# Patient Record
Sex: FEMALE | Race: WHITE | Employment: OTHER | URBAN - METROPOLITAN AREA
[De-identification: names, ages, dates, MRNs, and addresses within clinical notes are randomized per-mention and may not be internally consistent; named-entity substitution may affect disease eponyms.]

---

## 2017-01-03 ENCOUNTER — TELEPHONE (OUTPATIENT)
Dept: FAMILY MEDICINE CLINIC | Age: 62
End: 2017-01-03

## 2017-01-03 RX ORDER — TRAMADOL HYDROCHLORIDE 50 MG/1
50 TABLET ORAL EVERY 6 HOURS PRN
Qty: 60 TABLET | Refills: 0 | Status: SHIPPED | OUTPATIENT
Start: 2017-01-03 | End: 2017-01-19 | Stop reason: SDUPTHER

## 2017-01-19 DIAGNOSIS — M54.41 CHRONIC RIGHT-SIDED LOW BACK PAIN WITH RIGHT-SIDED SCIATICA: Chronic | ICD-10-CM

## 2017-01-19 DIAGNOSIS — M81.0 OSTEOPOROSIS: Primary | Chronic | ICD-10-CM

## 2017-01-19 DIAGNOSIS — G89.29 CHRONIC RIGHT-SIDED LOW BACK PAIN WITH RIGHT-SIDED SCIATICA: Chronic | ICD-10-CM

## 2017-01-19 RX ORDER — TRAMADOL HYDROCHLORIDE 50 MG/1
50 TABLET ORAL EVERY 6 HOURS PRN
Qty: 60 TABLET | Refills: 0 | Status: SHIPPED | OUTPATIENT
Start: 2017-01-19 | End: 2017-02-06 | Stop reason: SDUPTHER

## 2017-01-20 ENCOUNTER — HOSPITAL ENCOUNTER (OUTPATIENT)
Age: 62
Setting detail: SPECIMEN
Discharge: HOME | End: 2017-01-20
Payer: COMMERCIAL

## 2017-01-20 ENCOUNTER — OFFICE VISIT (OUTPATIENT)
Dept: FAMILY MEDICINE CLINIC | Age: 62
End: 2017-01-20

## 2017-01-20 VITALS
HEIGHT: 65 IN | SYSTOLIC BLOOD PRESSURE: 130 MMHG | OXYGEN SATURATION: 98 % | HEART RATE: 89 BPM | DIASTOLIC BLOOD PRESSURE: 68 MMHG | RESPIRATION RATE: 14 BRPM | TEMPERATURE: 98.2 F

## 2017-01-20 DIAGNOSIS — R30.0 DYSURIA: ICD-10-CM

## 2017-01-20 DIAGNOSIS — N30.00 ACUTE CYSTITIS WITHOUT HEMATURIA: Primary | ICD-10-CM

## 2017-01-20 DIAGNOSIS — N39.0 URINARY TRACT INFECTION WITHOUT HEMATURIA, SITE UNSPECIFIED: ICD-10-CM

## 2017-01-20 LAB
BILIRUBIN, POC: ABNORMAL
BLOOD URINE, POC: ABNORMAL
CLARITY, POC: CLEAR
COLOR, POC: ABNORMAL
GLUCOSE URINE, POC: ABNORMAL
KETONES, POC: ABNORMAL
LEUKOCYTE EST, POC: ABNORMAL
NITRITE, POC: POSITIVE
PH, POC: 5
PROTEIN, POC: ABNORMAL
SPECIFIC GRAVITY, POC: <=1.005
UROBILINOGEN, POC: 1

## 2017-01-20 PROCEDURE — 87086 URINE CULTURE/COLONY COUNT: CPT

## 2017-01-20 PROCEDURE — 81002 URINALYSIS NONAUTO W/O SCOPE: CPT | Performed by: NURSE PRACTITIONER

## 2017-01-20 PROCEDURE — 99213 OFFICE O/P EST LOW 20 MIN: CPT | Performed by: NURSE PRACTITIONER

## 2017-01-20 RX ORDER — SULFAMETHOXAZOLE AND TRIMETHOPRIM 800; 160 MG/1; MG/1
1 TABLET ORAL 2 TIMES DAILY
Qty: 14 TABLET | Refills: 0 | Status: SHIPPED | OUTPATIENT
Start: 2017-01-20 | End: 2017-06-13 | Stop reason: SDUPTHER

## 2017-01-20 RX ORDER — MELOXICAM 7.5 MG/1
TABLET ORAL
COMMUNITY
Start: 2017-01-10 | End: 2018-01-19 | Stop reason: ALTCHOICE

## 2017-01-20 RX ORDER — CIPROFLOXACIN 500 MG/1
500 TABLET, FILM COATED ORAL 2 TIMES DAILY
Qty: 28 TABLET | Refills: 0 | Status: SHIPPED | OUTPATIENT
Start: 2017-01-20 | End: 2017-01-20 | Stop reason: ALTCHOICE

## 2017-01-21 ASSESSMENT — ENCOUNTER SYMPTOMS
WHEEZING: 0
SHORTNESS OF BREATH: 0
BACK PAIN: 1
NAUSEA: 0
COUGH: 0
VOMITING: 0

## 2017-01-23 LAB — URINE CULTURE, ROUTINE: NORMAL

## 2017-01-24 ENCOUNTER — OFFICE VISIT (OUTPATIENT)
Dept: FAMILY MEDICINE CLINIC | Age: 62
End: 2017-01-24

## 2017-01-24 VITALS
RESPIRATION RATE: 18 BRPM | DIASTOLIC BLOOD PRESSURE: 74 MMHG | TEMPERATURE: 97.5 F | HEIGHT: 65 IN | OXYGEN SATURATION: 95 % | BODY MASS INDEX: 23.16 KG/M2 | HEART RATE: 100 BPM | WEIGHT: 139 LBS | SYSTOLIC BLOOD PRESSURE: 96 MMHG

## 2017-01-24 DIAGNOSIS — B36.9 FUNGAL DERMATITIS: Primary | ICD-10-CM

## 2017-01-24 DIAGNOSIS — K59.09 CHRONIC CONSTIPATION: ICD-10-CM

## 2017-01-24 DIAGNOSIS — L30.4 INTERTRIGO: ICD-10-CM

## 2017-01-24 PROCEDURE — 99213 OFFICE O/P EST LOW 20 MIN: CPT | Performed by: NURSE PRACTITIONER

## 2017-01-24 RX ORDER — LUBIPROSTONE 8 UG/1
8 CAPSULE, GELATIN COATED ORAL 2 TIMES DAILY WITH MEALS
Qty: 60 CAPSULE | Refills: 1 | Status: SHIPPED | OUTPATIENT
Start: 2017-01-24 | End: 2017-04-14 | Stop reason: ALTCHOICE

## 2017-01-24 RX ORDER — FLUCONAZOLE 150 MG/1
150 TABLET ORAL DAILY
Qty: 7 TABLET | Refills: 0 | Status: SHIPPED | OUTPATIENT
Start: 2017-01-24 | End: 2017-03-01 | Stop reason: ALTCHOICE

## 2017-01-24 RX ORDER — KETOCONAZOLE 20 MG/G
CREAM TOPICAL
Qty: 30 G | Refills: 1 | Status: SHIPPED | OUTPATIENT
Start: 2017-01-24 | End: 2017-03-27 | Stop reason: SDUPTHER

## 2017-01-27 DIAGNOSIS — K58.1 IRRITABLE BOWEL SYNDROME WITH CONSTIPATION: Primary | ICD-10-CM

## 2017-02-06 DIAGNOSIS — G89.29 CHRONIC RIGHT-SIDED LOW BACK PAIN WITH RIGHT-SIDED SCIATICA: Chronic | ICD-10-CM

## 2017-02-06 DIAGNOSIS — M81.0 OSTEOPOROSIS: Chronic | ICD-10-CM

## 2017-02-06 DIAGNOSIS — M54.41 CHRONIC RIGHT-SIDED LOW BACK PAIN WITH RIGHT-SIDED SCIATICA: Chronic | ICD-10-CM

## 2017-02-06 RX ORDER — TRAMADOL HYDROCHLORIDE 50 MG/1
50 TABLET ORAL EVERY 6 HOURS PRN
Qty: 60 TABLET | Refills: 0 | Status: SHIPPED | OUTPATIENT
Start: 2017-02-06 | End: 2017-02-23 | Stop reason: SDUPTHER

## 2017-02-08 ENCOUNTER — TELEPHONE (OUTPATIENT)
Dept: FAMILY MEDICINE CLINIC | Age: 62
End: 2017-02-08

## 2017-02-23 DIAGNOSIS — M54.41 CHRONIC RIGHT-SIDED LOW BACK PAIN WITH RIGHT-SIDED SCIATICA: Chronic | ICD-10-CM

## 2017-02-23 DIAGNOSIS — G89.29 CHRONIC RIGHT-SIDED LOW BACK PAIN WITH RIGHT-SIDED SCIATICA: Chronic | ICD-10-CM

## 2017-02-23 DIAGNOSIS — M81.0 OSTEOPOROSIS: Chronic | ICD-10-CM

## 2017-02-23 RX ORDER — TRAMADOL HYDROCHLORIDE 50 MG/1
50 TABLET ORAL EVERY 6 HOURS PRN
Qty: 60 TABLET | Refills: 0 | Status: SHIPPED | OUTPATIENT
Start: 2017-02-23 | End: 2017-03-13 | Stop reason: SDUPTHER

## 2017-03-01 ENCOUNTER — HOSPITAL ENCOUNTER (OUTPATIENT)
Age: 62
Setting detail: SPECIMEN
Discharge: HOME | End: 2017-03-01
Payer: COMMERCIAL

## 2017-03-01 ENCOUNTER — OFFICE VISIT (OUTPATIENT)
Dept: FAMILY MEDICINE CLINIC | Age: 62
End: 2017-03-01

## 2017-03-01 VITALS
DIASTOLIC BLOOD PRESSURE: 64 MMHG | HEIGHT: 65 IN | TEMPERATURE: 98.3 F | RESPIRATION RATE: 24 BRPM | OXYGEN SATURATION: 93 % | HEART RATE: 102 BPM | BODY MASS INDEX: 23.99 KG/M2 | SYSTOLIC BLOOD PRESSURE: 102 MMHG | WEIGHT: 144 LBS

## 2017-03-01 DIAGNOSIS — N39.0 URINARY TRACT INFECTION WITHOUT HEMATURIA, SITE UNSPECIFIED: Primary | ICD-10-CM

## 2017-03-01 DIAGNOSIS — N39.0 URINARY TRACT INFECTION WITHOUT HEMATURIA, SITE UNSPECIFIED: ICD-10-CM

## 2017-03-01 DIAGNOSIS — Z12.39 BREAST CANCER SCREENING: ICD-10-CM

## 2017-03-01 LAB
BACTERIA: ABNORMAL /HPF
BILIRUBIN URINE: NEGATIVE
BILIRUBIN, POC: NORMAL
BLOOD URINE, POC: NORMAL
BLOOD, URINE: NEGATIVE
CLARITY, POC: CLEAR
CLARITY: CLEAR
COLOR, POC: YELLOW
COLOR: YELLOW
EPITHELIAL CELLS, UA: ABNORMAL /HPF
GLUCOSE URINE, POC: NORMAL
GLUCOSE URINE: NEGATIVE MG/DL
KETONES, POC: NORMAL
KETONES, URINE: NEGATIVE MG/DL
LEUKOCYTE EST, POC: NORMAL
LEUKOCYTE ESTERASE, URINE: ABNORMAL
MUCUS: PRESENT
NITRITE, POC: NORMAL
NITRITE, URINE: NEGATIVE
PH UA: 6 (ref 5–9)
PH, POC: 5
PROTEIN UA: NEGATIVE MG/DL
PROTEIN, POC: NORMAL
RBC UA: ABNORMAL /HPF (ref 0–2)
RENAL EPITHELIAL, UA: ABNORMAL /HPF
SPECIFIC GRAVITY UA: 1.01 (ref 1–1.03)
SPECIFIC GRAVITY, POC: 1.01
UROBILINOGEN, POC: NORMAL
UROBILINOGEN, URINE: 0.2 E.U./DL
WBC UA: ABNORMAL /HPF (ref 0–5)

## 2017-03-01 PROCEDURE — 99213 OFFICE O/P EST LOW 20 MIN: CPT | Performed by: NURSE PRACTITIONER

## 2017-03-01 PROCEDURE — 81003 URINALYSIS AUTO W/O SCOPE: CPT | Performed by: NURSE PRACTITIONER

## 2017-03-01 PROCEDURE — 87086 URINE CULTURE/COLONY COUNT: CPT

## 2017-03-01 PROCEDURE — 81001 URINALYSIS AUTO W/SCOPE: CPT

## 2017-03-01 RX ORDER — CIPROFLOXACIN 500 MG/1
500 TABLET, FILM COATED ORAL 2 TIMES DAILY
Qty: 20 TABLET | Refills: 0 | Status: SHIPPED | OUTPATIENT
Start: 2017-03-01 | End: 2017-03-11

## 2017-03-01 ASSESSMENT — ENCOUNTER SYMPTOMS
BACK PAIN: 0
RESPIRATORY NEGATIVE: 1
NAUSEA: 0
VOMITING: 0
EYES NEGATIVE: 1

## 2017-03-03 LAB — URINE CULTURE, ROUTINE: NORMAL

## 2017-03-13 DIAGNOSIS — M81.0 OSTEOPOROSIS: Chronic | ICD-10-CM

## 2017-03-13 DIAGNOSIS — G89.29 CHRONIC RIGHT-SIDED LOW BACK PAIN WITH RIGHT-SIDED SCIATICA: Chronic | ICD-10-CM

## 2017-03-13 DIAGNOSIS — M54.41 CHRONIC RIGHT-SIDED LOW BACK PAIN WITH RIGHT-SIDED SCIATICA: Chronic | ICD-10-CM

## 2017-03-13 RX ORDER — TRAMADOL HYDROCHLORIDE 50 MG/1
50 TABLET ORAL EVERY 6 HOURS PRN
Qty: 60 TABLET | Refills: 0 | Status: SHIPPED | OUTPATIENT
Start: 2017-03-13 | End: 2017-03-29 | Stop reason: SDUPTHER

## 2017-03-27 ENCOUNTER — HOSPITAL ENCOUNTER (OUTPATIENT)
Age: 62
Setting detail: SPECIMEN
Discharge: HOME | End: 2017-03-27
Payer: COMMERCIAL

## 2017-03-27 ENCOUNTER — OFFICE VISIT (OUTPATIENT)
Dept: FAMILY MEDICINE CLINIC | Age: 62
End: 2017-03-27

## 2017-03-27 VITALS
OXYGEN SATURATION: 96 % | BODY MASS INDEX: 24.32 KG/M2 | RESPIRATION RATE: 12 BRPM | SYSTOLIC BLOOD PRESSURE: 120 MMHG | TEMPERATURE: 97.9 F | HEIGHT: 65 IN | WEIGHT: 146 LBS | DIASTOLIC BLOOD PRESSURE: 80 MMHG | HEART RATE: 100 BPM

## 2017-03-27 DIAGNOSIS — L30.4 INTERTRIGO: Primary | ICD-10-CM

## 2017-03-27 DIAGNOSIS — R30.0 DYSURIA: ICD-10-CM

## 2017-03-27 LAB
BILIRUBIN, POC: ABNORMAL
BLOOD URINE, POC: ABNORMAL
CLARITY, POC: CLEAR
COLOR, POC: ABNORMAL
GLUCOSE URINE, POC: ABNORMAL
KETONES, POC: ABNORMAL
LEUKOCYTE EST, POC: ABNORMAL
NITRITE, POC: ABNORMAL
PH, POC: 6
PROTEIN, POC: ABNORMAL
SPECIFIC GRAVITY, POC: <=1.005
UROBILINOGEN, POC: 1

## 2017-03-27 PROCEDURE — 87086 URINE CULTURE/COLONY COUNT: CPT

## 2017-03-27 PROCEDURE — 99213 OFFICE O/P EST LOW 20 MIN: CPT | Performed by: NURSE PRACTITIONER

## 2017-03-27 PROCEDURE — 81002 URINALYSIS NONAUTO W/O SCOPE: CPT | Performed by: NURSE PRACTITIONER

## 2017-03-27 RX ORDER — KETOCONAZOLE 20 MG/G
CREAM TOPICAL
Qty: 30 G | Refills: 1 | Status: ON HOLD | OUTPATIENT
Start: 2017-03-27 | End: 2017-04-19 | Stop reason: ALTCHOICE

## 2017-03-27 RX ORDER — FLUCONAZOLE 150 MG/1
150 TABLET ORAL DAILY
Qty: 7 TABLET | Refills: 0 | Status: SHIPPED | OUTPATIENT
Start: 2017-03-27 | End: 2017-04-14 | Stop reason: ALTCHOICE

## 2017-03-27 RX ORDER — PHENAZOPYRIDINE HYDROCHLORIDE 200 MG/1
TABLET, FILM COATED ORAL
Refills: 1 | COMMUNITY
Start: 2017-03-24 | End: 2017-12-20 | Stop reason: ALTCHOICE

## 2017-03-29 DIAGNOSIS — G89.29 CHRONIC RIGHT-SIDED LOW BACK PAIN WITH RIGHT-SIDED SCIATICA: Chronic | ICD-10-CM

## 2017-03-29 DIAGNOSIS — M54.41 CHRONIC RIGHT-SIDED LOW BACK PAIN WITH RIGHT-SIDED SCIATICA: Chronic | ICD-10-CM

## 2017-03-29 DIAGNOSIS — M81.0 OSTEOPOROSIS: Chronic | ICD-10-CM

## 2017-03-29 LAB — URINE CULTURE, ROUTINE: NORMAL

## 2017-03-30 ENCOUNTER — TELEPHONE (OUTPATIENT)
Dept: FAMILY MEDICINE CLINIC | Age: 62
End: 2017-03-30

## 2017-03-30 ENCOUNTER — HOSPITAL ENCOUNTER (OUTPATIENT)
Age: 62
Discharge: HOME | End: 2017-03-30
Payer: COMMERCIAL

## 2017-03-30 ENCOUNTER — HOSPITAL ENCOUNTER (OUTPATIENT)
Dept: GENERAL RADIOLOGY | Age: 62
Discharge: HOME | End: 2017-03-30
Payer: COMMERCIAL

## 2017-03-30 ENCOUNTER — HOSPITAL ENCOUNTER (OUTPATIENT)
Dept: WOMENS IMAGING | Age: 62
Discharge: HOME | End: 2017-03-30
Payer: COMMERCIAL

## 2017-03-30 DIAGNOSIS — Z12.39 BREAST CANCER SCREENING: ICD-10-CM

## 2017-03-30 DIAGNOSIS — K59.09 CHRONIC CONSTIPATION: ICD-10-CM

## 2017-03-30 PROCEDURE — 74000 XR ABDOMEN LIMITED (KUB): CPT

## 2017-03-30 PROCEDURE — G0202 SCR MAMMO BI INCL CAD: HCPCS

## 2017-03-30 RX ORDER — TRAMADOL HYDROCHLORIDE 50 MG/1
50 TABLET ORAL EVERY 6 HOURS PRN
Qty: 60 TABLET | Refills: 0 | Status: SHIPPED | OUTPATIENT
Start: 2017-03-30 | End: 2017-04-14 | Stop reason: SDUPTHER

## 2017-04-02 ASSESSMENT — ENCOUNTER SYMPTOMS
BACK PAIN: 1
VOMITING: 0
ABDOMINAL PAIN: 0
DIARRHEA: 0
NAUSEA: 0
SHORTNESS OF BREATH: 0
COUGH: 0
SORE THROAT: 0
WHEEZING: 0

## 2017-04-14 ENCOUNTER — OFFICE VISIT (OUTPATIENT)
Dept: FAMILY MEDICINE CLINIC | Age: 62
End: 2017-04-14

## 2017-04-14 VITALS
HEIGHT: 69 IN | TEMPERATURE: 98.3 F | OXYGEN SATURATION: 98 % | BODY MASS INDEX: 20.88 KG/M2 | RESPIRATION RATE: 20 BRPM | HEART RATE: 96 BPM | WEIGHT: 141 LBS | DIASTOLIC BLOOD PRESSURE: 72 MMHG | SYSTOLIC BLOOD PRESSURE: 116 MMHG

## 2017-04-14 DIAGNOSIS — K13.0 INTERTRIGO LABIALIS: ICD-10-CM

## 2017-04-14 DIAGNOSIS — M54.41 CHRONIC RIGHT-SIDED LOW BACK PAIN WITH RIGHT-SIDED SCIATICA: Chronic | ICD-10-CM

## 2017-04-14 DIAGNOSIS — Z79.899 HIGH RISK MEDICATION USE: ICD-10-CM

## 2017-04-14 DIAGNOSIS — M81.0 OSTEOPOROSIS: Chronic | ICD-10-CM

## 2017-04-14 DIAGNOSIS — R30.0 DYSURIA: Primary | ICD-10-CM

## 2017-04-14 DIAGNOSIS — L30.4 INTERTRIGO: ICD-10-CM

## 2017-04-14 DIAGNOSIS — G89.29 CHRONIC RIGHT-SIDED LOW BACK PAIN WITH RIGHT-SIDED SCIATICA: Chronic | ICD-10-CM

## 2017-04-14 LAB
AMPHETAMINE SCREEN, URINE: NORMAL
BARBITURATE SCREEN URINE: NORMAL
BENZODIAZEPINE SCREEN, URINE: NORMAL
BILIRUBIN, POC: NEGATIVE
BLOOD URINE, POC: NEGATIVE
CANNABINOID SCREEN URINE: NORMAL
CLARITY, POC: CLEAR
COCAINE METABOLITE SCREEN URINE: NORMAL
COLOR, POC: YELLOW
GLUCOSE URINE, POC: NEGATIVE
KETONES, POC: NEGATIVE
LEUKOCYTE EST, POC: NORMAL
Lab: NORMAL
NITRITE, POC: NEGATIVE
OPIATE SCREEN URINE: NORMAL
PH, POC: 5
PHENCYCLIDINE SCREEN URINE: NORMAL
PROTEIN, POC: NEGATIVE
SPECIFIC GRAVITY, POC: 1
UROBILINOGEN, POC: 0.2

## 2017-04-14 PROCEDURE — 99213 OFFICE O/P EST LOW 20 MIN: CPT | Performed by: NURSE PRACTITIONER

## 2017-04-14 PROCEDURE — 81003 URINALYSIS AUTO W/O SCOPE: CPT | Performed by: NURSE PRACTITIONER

## 2017-04-14 RX ORDER — NYSTATIN 100000 [USP'U]/G
POWDER TOPICAL
Qty: 1 BOTTLE | Refills: 3 | Status: SHIPPED | OUTPATIENT
Start: 2017-04-14 | End: 2018-01-19 | Stop reason: ALTCHOICE

## 2017-04-14 RX ORDER — FLUCONAZOLE 150 MG/1
150 TABLET ORAL DAILY
Qty: 7 TABLET | Refills: 0 | Status: SHIPPED | OUTPATIENT
Start: 2017-04-14 | End: 2017-05-09 | Stop reason: CLARIF

## 2017-04-14 RX ORDER — TRIAMCINOLONE ACETONIDE 1 MG/G
CREAM TOPICAL
Qty: 45 G | Refills: 1 | Status: ON HOLD | OUTPATIENT
Start: 2017-04-14 | End: 2017-04-19 | Stop reason: ALTCHOICE

## 2017-04-14 RX ORDER — TRAMADOL HYDROCHLORIDE 50 MG/1
100 TABLET ORAL EVERY 8 HOURS PRN
Qty: 120 TABLET | Refills: 2 | Status: SHIPPED | OUTPATIENT
Start: 2017-04-14 | End: 2017-07-21 | Stop reason: SDUPTHER

## 2017-04-17 ENCOUNTER — TELEPHONE (OUTPATIENT)
Dept: FAMILY MEDICINE CLINIC | Age: 62
End: 2017-04-17

## 2017-04-17 DIAGNOSIS — N39.0 URINARY TRACT INFECTION WITHOUT HEMATURIA, SITE UNSPECIFIED: Primary | ICD-10-CM

## 2017-04-17 LAB
ORGANISM: ABNORMAL
ORGANISM: ABNORMAL
URINE CULTURE, ROUTINE: ABNORMAL
URINE CULTURE, ROUTINE: ABNORMAL

## 2017-04-17 RX ORDER — CIPROFLOXACIN 500 MG/1
500 TABLET, FILM COATED ORAL 2 TIMES DAILY
Qty: 14 TABLET | Refills: 0 | Status: SHIPPED | OUTPATIENT
Start: 2017-04-17 | End: 2017-05-24 | Stop reason: SDUPTHER

## 2017-04-19 ENCOUNTER — ANESTHESIA EVENT (OUTPATIENT)
Dept: ENDOSCOPY | Age: 62
End: 2017-04-19
Payer: COMMERCIAL

## 2017-04-19 ENCOUNTER — ANESTHESIA (OUTPATIENT)
Dept: ENDOSCOPY | Age: 62
End: 2017-04-19
Payer: COMMERCIAL

## 2017-04-19 ENCOUNTER — HOSPITAL ENCOUNTER (OUTPATIENT)
Age: 62
Setting detail: OUTPATIENT SURGERY
Discharge: HOME | End: 2017-04-19
Attending: SPECIALIST | Admitting: SPECIALIST
Payer: COMMERCIAL

## 2017-04-19 VITALS
TEMPERATURE: 98.7 F | BODY MASS INDEX: 21.66 KG/M2 | SYSTOLIC BLOOD PRESSURE: 154 MMHG | OXYGEN SATURATION: 99 % | RESPIRATION RATE: 20 BRPM | HEART RATE: 76 BPM | WEIGHT: 130 LBS | HEIGHT: 65 IN | DIASTOLIC BLOOD PRESSURE: 77 MMHG

## 2017-04-19 VITALS
OXYGEN SATURATION: 98 % | SYSTOLIC BLOOD PRESSURE: 122 MMHG | RESPIRATION RATE: 17 BRPM | DIASTOLIC BLOOD PRESSURE: 58 MMHG

## 2017-04-19 PROCEDURE — 3700000001 HC ADD 15 MINUTES (ANESTHESIA): Performed by: SPECIALIST

## 2017-04-19 PROCEDURE — 3609027000 HC COLONOSCOPY: Performed by: SPECIALIST

## 2017-04-19 PROCEDURE — 6360000002 HC RX W HCPCS: Performed by: NURSE ANESTHETIST, CERTIFIED REGISTERED

## 2017-04-19 PROCEDURE — 6360000002 HC RX W HCPCS: Performed by: SPECIALIST

## 2017-04-19 PROCEDURE — 3700000000 HC ANESTHESIA ATTENDED CARE: Performed by: SPECIALIST

## 2017-04-19 PROCEDURE — 7100000011 HC PHASE II RECOVERY - ADDTL 15 MIN: Performed by: SPECIALIST

## 2017-04-19 PROCEDURE — 2580000003 HC RX 258: Performed by: STUDENT IN AN ORGANIZED HEALTH CARE EDUCATION/TRAINING PROGRAM

## 2017-04-19 PROCEDURE — 7100000010 HC PHASE II RECOVERY - FIRST 15 MIN: Performed by: SPECIALIST

## 2017-04-19 RX ORDER — ONDANSETRON 2 MG/ML
4 INJECTION INTRAMUSCULAR; INTRAVENOUS
Status: COMPLETED | OUTPATIENT
Start: 2017-04-19 | End: 2017-04-19

## 2017-04-19 RX ORDER — SODIUM CHLORIDE 9 MG/ML
INJECTION, SOLUTION INTRAVENOUS CONTINUOUS
Status: DISCONTINUED | OUTPATIENT
Start: 2017-04-19 | End: 2017-04-19 | Stop reason: HOSPADM

## 2017-04-19 RX ORDER — ONDANSETRON 2 MG/ML
4 INJECTION INTRAMUSCULAR; INTRAVENOUS EVERY 6 HOURS PRN
Status: CANCELLED | OUTPATIENT
Start: 2017-04-19

## 2017-04-19 RX ORDER — ONDANSETRON 2 MG/ML
4 INJECTION INTRAMUSCULAR; INTRAVENOUS EVERY 6 HOURS PRN
Status: DISCONTINUED | OUTPATIENT
Start: 2017-04-19 | End: 2017-04-19 | Stop reason: HOSPADM

## 2017-04-19 RX ORDER — PROPOFOL 10 MG/ML
INJECTION, EMULSION INTRAVENOUS CONTINUOUS PRN
Status: DISCONTINUED | OUTPATIENT
Start: 2017-04-19 | End: 2017-04-19 | Stop reason: SDUPTHER

## 2017-04-19 RX ORDER — SODIUM CHLORIDE 0.9 % (FLUSH) 0.9 %
10 SYRINGE (ML) INJECTION PRN
Status: DISCONTINUED | OUTPATIENT
Start: 2017-04-19 | End: 2017-04-19 | Stop reason: HOSPADM

## 2017-04-19 RX ORDER — SODIUM CHLORIDE 0.9 % (FLUSH) 0.9 %
10 SYRINGE (ML) INJECTION EVERY 12 HOURS SCHEDULED
Status: DISCONTINUED | OUTPATIENT
Start: 2017-04-19 | End: 2017-04-19 | Stop reason: HOSPADM

## 2017-04-19 RX ORDER — LIDOCAINE HYDROCHLORIDE 10 MG/ML
1 INJECTION, SOLUTION EPIDURAL; INFILTRATION; INTRACAUDAL; PERINEURAL
Status: DISCONTINUED | OUTPATIENT
Start: 2017-04-19 | End: 2017-04-19 | Stop reason: HOSPADM

## 2017-04-19 RX ADMIN — SODIUM CHLORIDE: 9 INJECTION, SOLUTION INTRAVENOUS at 10:18

## 2017-04-19 RX ADMIN — PROPOFOL 100 MCG/KG/MIN: 10 INJECTION, EMULSION INTRAVENOUS at 11:05

## 2017-04-19 RX ADMIN — ONDANSETRON 4 MG: 2 INJECTION INTRAMUSCULAR; INTRAVENOUS at 10:24

## 2017-04-19 ASSESSMENT — PAIN DESCRIPTION - DESCRIPTORS: DESCRIPTORS: CONSTANT

## 2017-04-19 ASSESSMENT — PAIN - FUNCTIONAL ASSESSMENT: PAIN_FUNCTIONAL_ASSESSMENT: 0-10

## 2017-05-09 ENCOUNTER — HOSPITAL ENCOUNTER (OUTPATIENT)
Age: 62
Setting detail: SPECIMEN
Discharge: HOME | End: 2017-05-09
Payer: COMMERCIAL

## 2017-05-09 ENCOUNTER — OFFICE VISIT (OUTPATIENT)
Dept: FAMILY MEDICINE CLINIC | Age: 62
End: 2017-05-09

## 2017-05-09 VITALS
WEIGHT: 144 LBS | OXYGEN SATURATION: 97 % | RESPIRATION RATE: 20 BRPM | SYSTOLIC BLOOD PRESSURE: 128 MMHG | HEART RATE: 86 BPM | BODY MASS INDEX: 23.99 KG/M2 | DIASTOLIC BLOOD PRESSURE: 78 MMHG | TEMPERATURE: 98.4 F | HEIGHT: 65 IN

## 2017-05-09 DIAGNOSIS — B37.9 YEAST INFECTION: Primary | ICD-10-CM

## 2017-05-09 DIAGNOSIS — R30.0 DYSURIA: ICD-10-CM

## 2017-05-09 LAB
BILIRUBIN, POC: NEGATIVE
BLOOD URINE, POC: NEGATIVE
CLARITY, POC: ABNORMAL
COLOR, POC: YELLOW
GLUCOSE URINE, POC: NEGATIVE
KETONES, POC: NEGATIVE
LEUKOCYTE EST, POC: ABNORMAL
NITRITE, POC: NEGATIVE
PH, POC: 6
PROTEIN, POC: NEGATIVE
SPECIFIC GRAVITY, POC: 1
UROBILINOGEN, POC: 0.2

## 2017-05-09 PROCEDURE — 87086 URINE CULTURE/COLONY COUNT: CPT

## 2017-05-09 PROCEDURE — 81003 URINALYSIS AUTO W/O SCOPE: CPT | Performed by: NURSE PRACTITIONER

## 2017-05-09 PROCEDURE — 99213 OFFICE O/P EST LOW 20 MIN: CPT | Performed by: NURSE PRACTITIONER

## 2017-05-09 RX ORDER — FLUCONAZOLE 150 MG/1
150 TABLET ORAL DAILY
Qty: 3 TABLET | Refills: 0 | Status: SHIPPED | OUTPATIENT
Start: 2017-05-09 | End: 2017-05-24 | Stop reason: ALTCHOICE

## 2017-05-09 ASSESSMENT — PATIENT HEALTH QUESTIONNAIRE - PHQ9
1. LITTLE INTEREST OR PLEASURE IN DOING THINGS: 0
SUM OF ALL RESPONSES TO PHQ9 QUESTIONS 1 & 2: 0
SUM OF ALL RESPONSES TO PHQ QUESTIONS 1-9: 0
2. FEELING DOWN, DEPRESSED OR HOPELESS: 0

## 2017-05-11 LAB — URINE CULTURE, ROUTINE: NORMAL

## 2017-05-24 ENCOUNTER — HOSPITAL ENCOUNTER (OUTPATIENT)
Age: 62
Setting detail: SPECIMEN
Discharge: HOME | End: 2017-05-24
Payer: COMMERCIAL

## 2017-05-24 ENCOUNTER — OFFICE VISIT (OUTPATIENT)
Dept: FAMILY MEDICINE CLINIC | Age: 62
End: 2017-05-24

## 2017-05-24 ENCOUNTER — HOSPITAL ENCOUNTER (OUTPATIENT)
Age: 62
Discharge: HOME | End: 2017-05-24
Payer: COMMERCIAL

## 2017-05-24 ENCOUNTER — HOSPITAL ENCOUNTER (OUTPATIENT)
Dept: GENERAL RADIOLOGY | Age: 62
Discharge: HOME | End: 2017-05-24
Payer: COMMERCIAL

## 2017-05-24 VITALS
DIASTOLIC BLOOD PRESSURE: 78 MMHG | TEMPERATURE: 97.4 F | WEIGHT: 143 LBS | RESPIRATION RATE: 20 BRPM | BODY MASS INDEX: 23.82 KG/M2 | HEIGHT: 65 IN | SYSTOLIC BLOOD PRESSURE: 132 MMHG | HEART RATE: 104 BPM

## 2017-05-24 DIAGNOSIS — R30.0 DYSURIA: ICD-10-CM

## 2017-05-24 DIAGNOSIS — M25.551 HIP PAIN, RIGHT: ICD-10-CM

## 2017-05-24 DIAGNOSIS — N39.0 URINARY TRACT INFECTION WITHOUT HEMATURIA, SITE UNSPECIFIED: Primary | ICD-10-CM

## 2017-05-24 DIAGNOSIS — N39.0 URINARY TRACT INFECTION WITHOUT HEMATURIA, SITE UNSPECIFIED: ICD-10-CM

## 2017-05-24 LAB
BILIRUBIN, POC: ABNORMAL
BLOOD URINE, POC: ABNORMAL
CLARITY, POC: ABNORMAL
COLOR, POC: ABNORMAL
GLUCOSE URINE, POC: ABNORMAL
KETONES, POC: ABNORMAL
LEUKOCYTE EST, POC: ABNORMAL
NITRITE, POC: POSITIVE
PH, POC: 5
PROTEIN, POC: 30
SPECIFIC GRAVITY, POC: <=1.005
UROBILINOGEN, POC: ABNORMAL

## 2017-05-24 PROCEDURE — 87086 URINE CULTURE/COLONY COUNT: CPT

## 2017-05-24 PROCEDURE — 73502 X-RAY EXAM HIP UNI 2-3 VIEWS: CPT

## 2017-05-24 PROCEDURE — 81002 URINALYSIS NONAUTO W/O SCOPE: CPT | Performed by: NURSE PRACTITIONER

## 2017-05-24 PROCEDURE — 99213 OFFICE O/P EST LOW 20 MIN: CPT | Performed by: NURSE PRACTITIONER

## 2017-05-24 RX ORDER — CIPROFLOXACIN 500 MG/1
500 TABLET, FILM COATED ORAL 2 TIMES DAILY
Qty: 14 TABLET | Refills: 0 | Status: SHIPPED | OUTPATIENT
Start: 2017-05-24 | End: 2017-05-31

## 2017-05-24 ASSESSMENT — ENCOUNTER SYMPTOMS
SHORTNESS OF BREATH: 0
NAUSEA: 0
CHEST TIGHTNESS: 0
WHEEZING: 0
VOMITING: 0
ABDOMINAL PAIN: 0

## 2017-05-26 LAB — URINE CULTURE, ROUTINE: NORMAL

## 2017-05-31 RX ORDER — GABAPENTIN 300 MG/1
300 CAPSULE ORAL 3 TIMES DAILY
Qty: 90 CAPSULE | Refills: 3 | Status: SHIPPED | OUTPATIENT
Start: 2017-05-31 | End: 2017-11-29 | Stop reason: SDUPTHER

## 2017-06-13 ENCOUNTER — OFFICE VISIT (OUTPATIENT)
Dept: FAMILY MEDICINE CLINIC | Age: 62
End: 2017-06-13

## 2017-06-13 ENCOUNTER — HOSPITAL ENCOUNTER (OUTPATIENT)
Age: 62
Setting detail: SPECIMEN
Discharge: HOME | End: 2017-06-13
Payer: COMMERCIAL

## 2017-06-13 VITALS
WEIGHT: 139 LBS | BODY MASS INDEX: 23.16 KG/M2 | TEMPERATURE: 98.2 F | HEIGHT: 65 IN | HEART RATE: 96 BPM | RESPIRATION RATE: 20 BRPM | DIASTOLIC BLOOD PRESSURE: 62 MMHG | SYSTOLIC BLOOD PRESSURE: 118 MMHG | OXYGEN SATURATION: 98 %

## 2017-06-13 DIAGNOSIS — N30.00 ACUTE CYSTITIS WITHOUT HEMATURIA: ICD-10-CM

## 2017-06-13 DIAGNOSIS — I65.23 BILATERAL CAROTID ARTERY STENOSIS: ICD-10-CM

## 2017-06-13 DIAGNOSIS — N39.0 URINARY TRACT INFECTION, SITE UNSPECIFIED: ICD-10-CM

## 2017-06-13 DIAGNOSIS — N39.0 URINARY TRACT INFECTION, SITE UNSPECIFIED: Primary | ICD-10-CM

## 2017-06-13 DIAGNOSIS — B37.9 YEAST INFECTION: ICD-10-CM

## 2017-06-13 LAB
BILIRUBIN, POC: NEGATIVE
BLOOD URINE, POC: NEGATIVE
CLARITY, POC: ABNORMAL
COLOR, POC: ABNORMAL
GLUCOSE URINE, POC: NEGATIVE
KETONES, POC: NEGATIVE
LEUKOCYTE EST, POC: 500
NITRITE, POC: POSITIVE
PH, POC: 6
PROTEIN, POC: NEGATIVE
SPECIFIC GRAVITY, POC: 1.01
UROBILINOGEN, POC: 0.1

## 2017-06-13 PROCEDURE — 81003 URINALYSIS AUTO W/O SCOPE: CPT | Performed by: NURSE PRACTITIONER

## 2017-06-13 PROCEDURE — 87077 CULTURE AEROBIC IDENTIFY: CPT

## 2017-06-13 PROCEDURE — 87086 URINE CULTURE/COLONY COUNT: CPT

## 2017-06-13 PROCEDURE — 99213 OFFICE O/P EST LOW 20 MIN: CPT | Performed by: NURSE PRACTITIONER

## 2017-06-13 PROCEDURE — 87186 SC STD MICRODIL/AGAR DIL: CPT

## 2017-06-13 RX ORDER — FLUCONAZOLE 150 MG/1
150 TABLET ORAL DAILY
Qty: 7 TABLET | Refills: 0 | Status: SHIPPED | OUTPATIENT
Start: 2017-06-13 | End: 2017-06-20

## 2017-06-13 RX ORDER — SULFAMETHOXAZOLE AND TRIMETHOPRIM 800; 160 MG/1; MG/1
1 TABLET ORAL 2 TIMES DAILY
Qty: 28 TABLET | Refills: 0 | Status: SHIPPED | OUTPATIENT
Start: 2017-06-13 | End: 2017-06-27

## 2017-06-15 ENCOUNTER — TELEPHONE (OUTPATIENT)
Dept: FAMILY MEDICINE CLINIC | Age: 62
End: 2017-06-15

## 2017-06-15 LAB
ORGANISM: ABNORMAL
URINE CULTURE, ROUTINE: ABNORMAL

## 2017-06-15 RX ORDER — CIPROFLOXACIN 500 MG/1
500 TABLET, FILM COATED ORAL 2 TIMES DAILY
Qty: 28 TABLET | Refills: 0 | Status: SHIPPED | OUTPATIENT
Start: 2017-06-15 | End: 2017-06-29

## 2017-07-21 ENCOUNTER — TELEPHONE (OUTPATIENT)
Dept: FAMILY MEDICINE CLINIC | Age: 62
End: 2017-07-21

## 2017-07-21 ENCOUNTER — HOSPITAL ENCOUNTER (OUTPATIENT)
Age: 62
Setting detail: SPECIMEN
Discharge: HOME | End: 2017-07-21
Payer: COMMERCIAL

## 2017-07-21 ENCOUNTER — OFFICE VISIT (OUTPATIENT)
Dept: FAMILY MEDICINE CLINIC | Age: 62
End: 2017-07-21

## 2017-07-21 VITALS
RESPIRATION RATE: 16 BRPM | TEMPERATURE: 98.3 F | SYSTOLIC BLOOD PRESSURE: 128 MMHG | HEART RATE: 102 BPM | DIASTOLIC BLOOD PRESSURE: 78 MMHG | BODY MASS INDEX: 22.66 KG/M2 | HEIGHT: 65 IN | WEIGHT: 136 LBS | OXYGEN SATURATION: 95 %

## 2017-07-21 DIAGNOSIS — R11.0 NAUSEA: ICD-10-CM

## 2017-07-21 DIAGNOSIS — K90.49 INTOLERANCE TO FATTY FOOD: ICD-10-CM

## 2017-07-21 DIAGNOSIS — M54.41 CHRONIC RIGHT-SIDED LOW BACK PAIN WITH RIGHT-SIDED SCIATICA: Chronic | ICD-10-CM

## 2017-07-21 DIAGNOSIS — N20.0 KIDNEY STONE: ICD-10-CM

## 2017-07-21 DIAGNOSIS — N39.0 URINARY TRACT INFECTION, SITE UNSPECIFIED: ICD-10-CM

## 2017-07-21 DIAGNOSIS — N30.10 INTERSTITIAL CYSTITIS: Primary | ICD-10-CM

## 2017-07-21 DIAGNOSIS — Z79.899 HIGH RISK MEDICATION USE: ICD-10-CM

## 2017-07-21 DIAGNOSIS — M81.0 OSTEOPOROSIS: Chronic | ICD-10-CM

## 2017-07-21 DIAGNOSIS — G89.29 CHRONIC RIGHT-SIDED LOW BACK PAIN WITH RIGHT-SIDED SCIATICA: Chronic | ICD-10-CM

## 2017-07-21 LAB
BILIRUBIN, POC: NEGATIVE
BLOOD URINE, POC: NEGATIVE
CLARITY, POC: NORMAL
COLOR, POC: YELLOW
GLUCOSE URINE, POC: NEGATIVE
KETONES, POC: NEGATIVE
LEUKOCYTE EST, POC: NEGATIVE
NITRITE, POC: NEGATIVE
PH, POC: 6
PROTEIN, POC: NEGATIVE
SPECIFIC GRAVITY, POC: 1.02
UROBILINOGEN, POC: 3.5

## 2017-07-21 PROCEDURE — 81003 URINALYSIS AUTO W/O SCOPE: CPT | Performed by: NURSE PRACTITIONER

## 2017-07-21 PROCEDURE — 87086 URINE CULTURE/COLONY COUNT: CPT

## 2017-07-21 PROCEDURE — 80307 DRUG TEST PRSMV CHEM ANLYZR: CPT

## 2017-07-21 PROCEDURE — 99213 OFFICE O/P EST LOW 20 MIN: CPT | Performed by: NURSE PRACTITIONER

## 2017-07-21 PROCEDURE — 87077 CULTURE AEROBIC IDENTIFY: CPT

## 2017-07-21 PROCEDURE — 87186 SC STD MICRODIL/AGAR DIL: CPT

## 2017-07-21 RX ORDER — TRAMADOL HYDROCHLORIDE 50 MG/1
100 TABLET ORAL EVERY 8 HOURS PRN
Qty: 120 TABLET | Refills: 2 | Status: SHIPPED | OUTPATIENT
Start: 2017-07-21 | End: 2017-11-02 | Stop reason: SDUPTHER

## 2017-07-21 RX ORDER — CONJUGATED ESTROGENS 0.62 MG/G
CREAM VAGINAL
Refills: 4 | COMMUNITY
Start: 2017-06-30 | End: 2017-12-22 | Stop reason: SDUPTHER

## 2017-07-21 ASSESSMENT — PATIENT HEALTH QUESTIONNAIRE - PHQ9
1. LITTLE INTEREST OR PLEASURE IN DOING THINGS: 1
SUM OF ALL RESPONSES TO PHQ QUESTIONS 1-9: 2
2. FEELING DOWN, DEPRESSED OR HOPELESS: 1
SUM OF ALL RESPONSES TO PHQ9 QUESTIONS 1 & 2: 2

## 2017-07-23 LAB
ORGANISM: ABNORMAL
URINE CULTURE, ROUTINE: ABNORMAL

## 2017-07-24 ENCOUNTER — TELEPHONE (OUTPATIENT)
Dept: FAMILY MEDICINE CLINIC | Age: 62
End: 2017-07-24

## 2017-07-24 DIAGNOSIS — N39.0 URINARY TRACT INFECTION WITHOUT HEMATURIA, SITE UNSPECIFIED: Primary | ICD-10-CM

## 2017-07-24 LAB
ALCOHOL URINE: NEGATIVE MG/DL
AMPHETAMINE SCREEN, URINE: NEGATIVE NG/ML
BARBITURATE SCREEN URINE: NEGATIVE NG/ML
BENZODIAZEPINE SCREEN, URINE: NEGATIVE NG/ML
CANNABINOID SCREEN URINE: NEGATIVE NG/ML
COCAINE METABOLITE SCREEN URINE: NEGATIVE NG/ML
CREATININE URINE: 25.3 MG/DL (ref 20–400)
Lab: NORMAL
MDMA URINE: NEGATIVE NG/ML
METHADONE SCREEN, URINE: NEGATIVE NG/ML
OPIATE SCREEN URINE: NEGATIVE NG/ML
OXYCODONE SCREEN URINE: NEGATIVE NG/ML
PHENCYCLIDINE SCREEN URINE: NEGATIVE NG/ML
PROPOXYPHENE SCREEN: NEGATIVE NG/ML

## 2017-07-24 RX ORDER — CIPROFLOXACIN 250 MG/1
250 TABLET, FILM COATED ORAL 2 TIMES DAILY
Qty: 14 TABLET | Refills: 0 | Status: SHIPPED | OUTPATIENT
Start: 2017-07-24 | End: 2017-08-03

## 2017-08-11 ENCOUNTER — TELEPHONE (OUTPATIENT)
Dept: FAMILY MEDICINE CLINIC | Age: 62
End: 2017-08-11

## 2017-08-11 ENCOUNTER — HOSPITAL ENCOUNTER (OUTPATIENT)
Dept: ULTRASOUND IMAGING | Age: 62
Discharge: HOME | End: 2017-08-11
Payer: COMMERCIAL

## 2017-08-11 ENCOUNTER — HOSPITAL ENCOUNTER (OUTPATIENT)
Dept: GENERAL RADIOLOGY | Age: 62
Discharge: HOME | End: 2017-08-11
Payer: COMMERCIAL

## 2017-08-11 DIAGNOSIS — N20.0 KIDNEY STONE: ICD-10-CM

## 2017-08-11 DIAGNOSIS — R10.9 RIGHT FLANK PAIN: Primary | ICD-10-CM

## 2017-08-11 DIAGNOSIS — I65.23 BILATERAL CAROTID ARTERY STENOSIS: ICD-10-CM

## 2017-08-11 DIAGNOSIS — R11.0 NAUSEA: ICD-10-CM

## 2017-08-11 DIAGNOSIS — N30.10 INTERSTITIAL CYSTITIS: ICD-10-CM

## 2017-08-11 DIAGNOSIS — K90.49 INTOLERANCE TO FATTY FOOD: ICD-10-CM

## 2017-08-11 PROCEDURE — 76705 ECHO EXAM OF ABDOMEN: CPT

## 2017-08-11 PROCEDURE — 93880 EXTRACRANIAL BILAT STUDY: CPT

## 2017-08-11 PROCEDURE — 74000 XR ABDOMEN LIMITED (KUB): CPT

## 2017-08-22 ENCOUNTER — HOSPITAL ENCOUNTER (OUTPATIENT)
Dept: CT IMAGING | Age: 62
Discharge: HOME | End: 2017-08-22
Payer: COMMERCIAL

## 2017-08-22 DIAGNOSIS — R10.9 RIGHT FLANK PAIN: ICD-10-CM

## 2017-08-22 PROCEDURE — 74176 CT ABD & PELVIS W/O CONTRAST: CPT

## 2017-08-24 ENCOUNTER — TELEPHONE (OUTPATIENT)
Dept: FAMILY MEDICINE CLINIC | Age: 62
End: 2017-08-24

## 2017-08-25 ENCOUNTER — TELEPHONE (OUTPATIENT)
Dept: FAMILY MEDICINE CLINIC | Age: 62
End: 2017-08-25

## 2017-08-25 DIAGNOSIS — N11.9 PYELONEPHRITIS, CHRONIC: Primary | ICD-10-CM

## 2017-08-28 ENCOUNTER — TELEPHONE (OUTPATIENT)
Dept: FAMILY MEDICINE CLINIC | Age: 62
End: 2017-08-28

## 2017-08-28 DIAGNOSIS — N11.9 CHRONIC PYELONEPHRITIS: Primary | ICD-10-CM

## 2017-08-28 RX ORDER — SULFAMETHOXAZOLE AND TRIMETHOPRIM 800; 160 MG/1; MG/1
1 TABLET ORAL 2 TIMES DAILY
Qty: 28 TABLET | Refills: 0 | Status: SHIPPED | OUTPATIENT
Start: 2017-08-28 | End: 2017-09-11

## 2017-09-13 ENCOUNTER — HOSPITAL ENCOUNTER (OUTPATIENT)
Age: 62
Setting detail: SPECIMEN
Discharge: HOME | End: 2017-09-13
Payer: COMMERCIAL

## 2017-09-13 ENCOUNTER — OFFICE VISIT (OUTPATIENT)
Dept: FAMILY MEDICINE CLINIC | Age: 62
End: 2017-09-13

## 2017-09-13 VITALS
TEMPERATURE: 98.8 F | OXYGEN SATURATION: 99 % | WEIGHT: 136.25 LBS | HEIGHT: 65 IN | SYSTOLIC BLOOD PRESSURE: 122 MMHG | HEART RATE: 83 BPM | RESPIRATION RATE: 18 BRPM | DIASTOLIC BLOOD PRESSURE: 80 MMHG | BODY MASS INDEX: 22.7 KG/M2

## 2017-09-13 DIAGNOSIS — N39.0 URINARY TRACT INFECTION WITHOUT HEMATURIA, SITE UNSPECIFIED: ICD-10-CM

## 2017-09-13 DIAGNOSIS — R30.0 DYSURIA: ICD-10-CM

## 2017-09-13 DIAGNOSIS — N39.0 URINARY TRACT INFECTION WITHOUT HEMATURIA, SITE UNSPECIFIED: Primary | ICD-10-CM

## 2017-09-13 LAB
BACTERIA: ABNORMAL /HPF
BILIRUBIN URINE: NEGATIVE
BILIRUBIN, POC: ABNORMAL
BLOOD URINE, POC: ABNORMAL
BLOOD, URINE: NEGATIVE
CLARITY, POC: CLEAR
CLARITY: CLEAR
COLOR, POC: ABNORMAL
COLOR: YELLOW
EPITHELIAL CELLS, UA: ABNORMAL /HPF
GLUCOSE URINE, POC: ABNORMAL
GLUCOSE URINE: NEGATIVE MG/DL
KETONES, POC: ABNORMAL
KETONES, URINE: NEGATIVE MG/DL
LEUKOCYTE EST, POC: ABNORMAL
LEUKOCYTE ESTERASE, URINE: ABNORMAL
NITRITE, POC: ABNORMAL
NITRITE, URINE: NEGATIVE
PH UA: 6.5 (ref 5–9)
PH, POC: 6
PROTEIN UA: NEGATIVE MG/DL
PROTEIN, POC: ABNORMAL
RBC UA: ABNORMAL /HPF (ref 0–2)
SPECIFIC GRAVITY UA: 1 (ref 1–1.03)
SPECIFIC GRAVITY, POC: 1.01
UROBILINOGEN, POC: 3.5
UROBILINOGEN, URINE: 0.2 E.U./DL
WBC UA: ABNORMAL /HPF (ref 0–5)
YEAST: PRESENT

## 2017-09-13 PROCEDURE — 81002 URINALYSIS NONAUTO W/O SCOPE: CPT | Performed by: NURSE PRACTITIONER

## 2017-09-13 PROCEDURE — 87086 URINE CULTURE/COLONY COUNT: CPT

## 2017-09-13 PROCEDURE — 99213 OFFICE O/P EST LOW 20 MIN: CPT | Performed by: NURSE PRACTITIONER

## 2017-09-13 PROCEDURE — 81001 URINALYSIS AUTO W/SCOPE: CPT

## 2017-09-13 RX ORDER — CIPROFLOXACIN 500 MG/1
500 TABLET, FILM COATED ORAL 2 TIMES DAILY
Qty: 14 TABLET | Refills: 0 | Status: SHIPPED | OUTPATIENT
Start: 2017-09-13 | End: 2017-09-15

## 2017-09-13 ASSESSMENT — ENCOUNTER SYMPTOMS
VOMITING: 0
ABDOMINAL PAIN: 1
NAUSEA: 0
WHEEZING: 0
DIARRHEA: 0
CHEST TIGHTNESS: 0
SHORTNESS OF BREATH: 0
CONSTIPATION: 0

## 2017-09-15 LAB — URINE CULTURE, ROUTINE: NORMAL

## 2017-09-15 RX ORDER — FLUCONAZOLE 150 MG/1
150 TABLET ORAL ONCE
Qty: 1 TABLET | Refills: 0 | Status: SHIPPED | OUTPATIENT
Start: 2017-09-15 | End: 2017-09-15

## 2017-10-06 ENCOUNTER — HOSPITAL ENCOUNTER (OUTPATIENT)
Dept: LAB | Age: 62
Discharge: HOME | End: 2017-10-06
Payer: COMMERCIAL

## 2017-10-06 PROCEDURE — 87086 URINE CULTURE/COLONY COUNT: CPT

## 2017-10-08 LAB — URINE CULTURE, ROUTINE: NORMAL

## 2017-10-29 ENCOUNTER — HOSPITAL ENCOUNTER (OUTPATIENT)
Age: 62
Setting detail: SPECIMEN
Discharge: HOME | End: 2017-10-29
Payer: COMMERCIAL

## 2017-10-29 ENCOUNTER — OFFICE VISIT (OUTPATIENT)
Dept: FAMILY MEDICINE CLINIC | Age: 62
End: 2017-10-29

## 2017-10-29 VITALS
DIASTOLIC BLOOD PRESSURE: 78 MMHG | HEIGHT: 65 IN | BODY MASS INDEX: 21.99 KG/M2 | SYSTOLIC BLOOD PRESSURE: 116 MMHG | TEMPERATURE: 97.8 F | WEIGHT: 132 LBS | OXYGEN SATURATION: 98 % | HEART RATE: 122 BPM

## 2017-10-29 DIAGNOSIS — R30.0 DYSURIA: Primary | ICD-10-CM

## 2017-10-29 DIAGNOSIS — R30.0 DYSURIA: ICD-10-CM

## 2017-10-29 LAB
BILIRUBIN, POC: NORMAL
BLOOD URINE, POC: NORMAL
CLARITY, POC: CLEAR
COLOR, POC: YELLOW
GLUCOSE URINE, POC: NORMAL
KETONES, POC: NORMAL
LEUKOCYTE EST, POC: NORMAL
NITRITE, POC: NORMAL
PH, POC: 6
PROTEIN, POC: NORMAL
SPECIFIC GRAVITY, POC: 1.01
UROBILINOGEN, POC: NORMAL

## 2017-10-29 PROCEDURE — 99213 OFFICE O/P EST LOW 20 MIN: CPT | Performed by: PHYSICIAN ASSISTANT

## 2017-10-29 PROCEDURE — 81003 URINALYSIS AUTO W/O SCOPE: CPT | Performed by: PHYSICIAN ASSISTANT

## 2017-10-29 PROCEDURE — 4004F PT TOBACCO SCREEN RCVD TLK: CPT | Performed by: PHYSICIAN ASSISTANT

## 2017-10-29 PROCEDURE — 3014F SCREEN MAMMO DOC REV: CPT | Performed by: PHYSICIAN ASSISTANT

## 2017-10-29 PROCEDURE — G8420 CALC BMI NORM PARAMETERS: HCPCS | Performed by: PHYSICIAN ASSISTANT

## 2017-10-29 PROCEDURE — G8484 FLU IMMUNIZE NO ADMIN: HCPCS | Performed by: PHYSICIAN ASSISTANT

## 2017-10-29 PROCEDURE — 3017F COLORECTAL CA SCREEN DOC REV: CPT | Performed by: PHYSICIAN ASSISTANT

## 2017-10-29 PROCEDURE — G8427 DOCREV CUR MEDS BY ELIG CLIN: HCPCS | Performed by: PHYSICIAN ASSISTANT

## 2017-10-29 PROCEDURE — 87086 URINE CULTURE/COLONY COUNT: CPT

## 2017-10-29 PROCEDURE — G8599 NO ASA/ANTIPLAT THER USE RNG: HCPCS | Performed by: PHYSICIAN ASSISTANT

## 2017-10-29 NOTE — PROGRESS NOTES
SUBJECTIVE  Coy Gregory, 64 y.o. female presents today with:  Chief Complaint   Patient presents with    Dysuria     x 1 week Pt presents today c/o burning when urinating sx are gradually worsening also states that she is experiencing frequency     PCP:  Kinsey Gaitan CNP      Dysuria    This is a new problem. The current episode started in the past 7 days. The problem occurs intermittently. The problem has been waxing and waning. The quality of the pain is described as burning. The pain is mild. There has been no fever. She is sexually active. Associated symptoms include frequency. Pertinent negatives include no chills, discharge, flank pain, hematuria, hesitancy or urgency. She has tried nothing for the symptoms. There is no history of recurrent UTIs. 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  2 2013    HYSTERECTOMY  7/14/11    bso Earnesteen Greaser    KIDNEY STONE SURGERY      x2    OTHER SURGICAL HISTORY      laser surgery facundo dx    IA COLON CA SCRN NOT HI RSK IND N/A 4/19/2017    COLONOSCOPY performed by Austin Eagle MD at [address] N/A 10/19/2016    ANAL PROCTO SIGMOIDOSCOPY RIGID / excision perineal nodule performed by Leticia Buitrago MD at [address] [place] [address]       Social History     Social History    Marital status:      Spouse name: N/A    Number of children: N/A    Years of education: N/A     Occupational History    cook      Social History Main Topics    Smoking status: Current Every Day Smoker

## 2017-10-31 LAB — URINE CULTURE, ROUTINE: NORMAL

## 2017-11-02 ENCOUNTER — TELEPHONE (OUTPATIENT)
Dept: FAMILY MEDICINE CLINIC | Age: 62
End: 2017-11-02

## 2017-11-02 DIAGNOSIS — M54.41 CHRONIC RIGHT-SIDED LOW BACK PAIN WITH RIGHT-SIDED SCIATICA: Chronic | ICD-10-CM

## 2017-11-02 DIAGNOSIS — G89.29 CHRONIC RIGHT-SIDED LOW BACK PAIN WITH RIGHT-SIDED SCIATICA: Chronic | ICD-10-CM

## 2017-11-02 RX ORDER — TRAMADOL HYDROCHLORIDE 50 MG/1
100 TABLET ORAL EVERY 8 HOURS PRN
Qty: 120 TABLET | Refills: 0 | Status: SHIPPED | OUTPATIENT
Start: 2017-11-02 | End: 2017-12-07 | Stop reason: SDUPTHER

## 2017-11-02 NOTE — TELEPHONE ENCOUNTER
Patient called in today to verify appointment and to make sure she did not have an appointment for today, states she was seen Sunday in ready [facility] and given an after care summery of another person who was seen but called to make sure the summery was not ment for her, she bring the summery back to the office today

## 2017-11-30 RX ORDER — GABAPENTIN 300 MG/1
300 CAPSULE ORAL 3 TIMES DAILY
Qty: 90 CAPSULE | Refills: 3 | Status: SHIPPED | OUTPATIENT
Start: 2017-11-30 | End: 2018-03-12 | Stop reason: ALTCHOICE

## 2017-12-07 DIAGNOSIS — M54.41 CHRONIC RIGHT-SIDED LOW BACK PAIN WITH RIGHT-SIDED SCIATICA: Chronic | ICD-10-CM

## 2017-12-07 DIAGNOSIS — G89.29 CHRONIC RIGHT-SIDED LOW BACK PAIN WITH RIGHT-SIDED SCIATICA: Chronic | ICD-10-CM

## 2017-12-07 RX ORDER — TRAMADOL HYDROCHLORIDE 50 MG/1
100 TABLET ORAL EVERY 8 HOURS PRN
Qty: 120 TABLET | Refills: 0 | OUTPATIENT
Start: 2017-12-07

## 2017-12-07 RX ORDER — TRAMADOL HYDROCHLORIDE 50 MG/1
100 TABLET ORAL EVERY 8 HOURS PRN
Qty: 60 TABLET | Refills: 0 | Status: SHIPPED | OUTPATIENT
Start: 2017-12-07 | End: 2017-12-21 | Stop reason: SDUPTHER

## 2017-12-09 ENCOUNTER — TELEPHONE (OUTPATIENT)
Dept: FAMILY MEDICINE CLINIC | Age: 62
End: 2017-12-09

## 2017-12-09 ENCOUNTER — NURSE ONLY (OUTPATIENT)
Dept: FAMILY MEDICINE CLINIC | Age: 62
End: 2017-12-09

## 2017-12-09 DIAGNOSIS — R30.0 DYSURIA: Primary | ICD-10-CM

## 2017-12-09 LAB
BILIRUBIN, POC: NORMAL
BLOOD URINE, POC: NORMAL
CLARITY, POC: NORMAL
COLOR, POC: YELLOW
GLUCOSE URINE, POC: NORMAL
KETONES, POC: NORMAL
LEUKOCYTE EST, POC: NORMAL
NITRITE, POC: NORMAL
PH, POC: 6
PROTEIN, POC: NORMAL
SPECIFIC GRAVITY, POC: 1.02
UROBILINOGEN, POC: NORMAL

## 2017-12-09 PROCEDURE — 81003 URINALYSIS AUTO W/O SCOPE: CPT | Performed by: NURSE PRACTITIONER

## 2017-12-09 NOTE — PROGRESS NOTES
Pt was not seen today . Pts  dropped off urine sample to be tested in lab today. This was ok pre on call YENIFER Appiah . Al Appiah called with results .

## 2017-12-11 ENCOUNTER — HOSPITAL ENCOUNTER (OUTPATIENT)
Age: 62
Setting detail: SPECIMEN
Discharge: HOME | End: 2017-12-11
Payer: COMMERCIAL

## 2017-12-11 PROCEDURE — 87077 CULTURE AEROBIC IDENTIFY: CPT

## 2017-12-11 PROCEDURE — 87186 SC STD MICRODIL/AGAR DIL: CPT

## 2017-12-11 PROCEDURE — 87086 URINE CULTURE/COLONY COUNT: CPT

## 2017-12-12 ENCOUNTER — TELEPHONE (OUTPATIENT)
Dept: FAMILY MEDICINE CLINIC | Age: 62
End: 2017-12-12

## 2017-12-13 ENCOUNTER — TELEPHONE (OUTPATIENT)
Dept: FAMILY MEDICINE CLINIC | Age: 62
End: 2017-12-13

## 2017-12-13 NOTE — TELEPHONE ENCOUNTER
Please call patient to inform her that her recent urine sample does show signs of bacteria but the final culture and sensitivity results are not in yet. Her concentration of bacteria in the urine is 25,000 colony forming units. Typically, with UTI's we would expect to see greater than 100,000 colony forming units. If she is having pain with urination, urgency, frequency, or bladder pressure/pain then we could treat anyway to see if there is improvement, however, to decide on the appropriate antibiotic we need sensitivities and this has not yet returned from the lab.

## 2017-12-13 NOTE — TELEPHONE ENCOUNTER
Patient called states she is still having pain with urination, urgency, and frequency. Wants to know if you can go ahead and order an antibiotic.

## 2017-12-13 NOTE — TELEPHONE ENCOUNTER
Melita patient calling wanting to know that status of her urine results. I have let the patient know that the results are in but, [facility] is not in the office until tomorrow. She wanted to know if another provider could address the labs today because she is in a lot of pain with the Dysuria. Please advise.     Results for orders placed or performed during the hospital encounter of 12/11/17   Urine Culture   Result Value Ref Range    Urine Culture, Routine      Organism Enterococcus Group D (A)     Urine Culture, Routine 25,000 CFU/ml  ID and sensitivity to follow

## 2017-12-14 ENCOUNTER — TELEPHONE (OUTPATIENT)
Dept: FAMILY MEDICINE CLINIC | Age: 62
End: 2017-12-14

## 2017-12-14 DIAGNOSIS — N39.0 URINARY TRACT INFECTION WITHOUT HEMATURIA, SITE UNSPECIFIED: Primary | ICD-10-CM

## 2017-12-14 LAB
ORGANISM: ABNORMAL
URINE CULTURE, ROUTINE: ABNORMAL
URINE CULTURE, ROUTINE: ABNORMAL

## 2017-12-14 RX ORDER — LEVOFLOXACIN 500 MG/1
500 TABLET, FILM COATED ORAL DAILY
Qty: 7 TABLET | Refills: 0 | Status: SHIPPED | OUTPATIENT
Start: 2017-12-14 | End: 2017-12-20 | Stop reason: ALTCHOICE

## 2017-12-14 NOTE — TELEPHONE ENCOUNTER
[facility] Should follow up in 2 weeks. The patient is instructed to take Probiotic tablets twice a day for the duration of antibiotic therapy and for 4 days after completion of antibiotics. This will help restore the good bacteria to your colon and prevent side effects of antibiotic therapy such as cramping and diarrhea. Probiotic tablets can be found at your local pharmacy over the counter. Ask your pharmacist if you need help finding tablets.

## 2017-12-14 NOTE — TELEPHONE ENCOUNTER
----- Message from Gregoria Vasquez sent at 12/14/2017  9:12 AM EST -----  Patient called for results, stated her pharmacy is [facility]

## 2017-12-19 ENCOUNTER — TELEPHONE (OUTPATIENT)
Dept: FAMILY MEDICINE CLINIC | Age: 62
End: 2017-12-19

## 2017-12-19 DIAGNOSIS — G89.29 CHRONIC RIGHT-SIDED LOW BACK PAIN WITH RIGHT-SIDED SCIATICA: Chronic | ICD-10-CM

## 2017-12-19 DIAGNOSIS — M54.41 CHRONIC RIGHT-SIDED LOW BACK PAIN WITH RIGHT-SIDED SCIATICA: Chronic | ICD-10-CM

## 2017-12-19 RX ORDER — TRAMADOL HYDROCHLORIDE 50 MG/1
100 TABLET ORAL EVERY 8 HOURS PRN
Qty: 60 TABLET | Refills: 0 | Status: CANCELLED | OUTPATIENT
Start: 2017-12-19

## 2017-12-19 NOTE — TELEPHONE ENCOUNTER
Pt had an appt today but had to cxl because her ride had the flu---she says she needs a refill of her tramadol and cant get an appt with you till January 18th--- I told her that you usually have to see the patient every 3 months to get controlled medication---her last appt with you was in July--when I toold pt this she said she has chronic pain and a buldging disc and needed her pain meds---please advise=------------------a h

## 2017-12-20 ENCOUNTER — APPOINTMENT (OUTPATIENT)
Dept: CT IMAGING | Age: 62
End: 2017-12-20
Payer: COMMERCIAL

## 2017-12-20 ENCOUNTER — HOSPITAL ENCOUNTER (EMERGENCY)
Age: 62
Discharge: HOME | End: 2017-12-20
Attending: EMERGENCY MEDICINE
Payer: COMMERCIAL

## 2017-12-20 VITALS
DIASTOLIC BLOOD PRESSURE: 82 MMHG | RESPIRATION RATE: 19 BRPM | WEIGHT: 130 LBS | TEMPERATURE: 99.4 F | OXYGEN SATURATION: 96 % | HEART RATE: 82 BPM | HEIGHT: 65 IN | SYSTOLIC BLOOD PRESSURE: 161 MMHG | BODY MASS INDEX: 21.66 KG/M2

## 2017-12-20 DIAGNOSIS — N30.00 ACUTE CYSTITIS WITHOUT HEMATURIA: Primary | ICD-10-CM

## 2017-12-20 LAB
BACTERIA: ABNORMAL /HPF
BILIRUBIN URINE: NEGATIVE
BLOOD, URINE: NEGATIVE
CLARITY: CLEAR
COLOR: YELLOW
EPITHELIAL CELLS, UA: ABNORMAL /HPF
GLUCOSE URINE: NEGATIVE MG/DL
KETONES, URINE: NEGATIVE MG/DL
LEUKOCYTE ESTERASE, URINE: NORMAL
NITRITE, URINE: NEGATIVE
PH UA: 5.5 (ref 5–9)
PROTEIN UA: NEGATIVE MG/DL
RBC UA: ABNORMAL /HPF (ref 0–2)
SPECIFIC GRAVITY UA: <=1.005 (ref 1–1.03)
URINE REFLEX TO CULTURE: YES
UROBILINOGEN, URINE: 0.2 E.U./DL
WBC UA: ABNORMAL /HPF (ref 0–5)

## 2017-12-20 PROCEDURE — 6370000000 HC RX 637 (ALT 250 FOR IP): Performed by: EMERGENCY MEDICINE

## 2017-12-20 PROCEDURE — 99284 EMERGENCY DEPT VISIT MOD MDM: CPT

## 2017-12-20 PROCEDURE — 74176 CT ABD & PELVIS W/O CONTRAST: CPT

## 2017-12-20 PROCEDURE — 81001 URINALYSIS AUTO W/SCOPE: CPT

## 2017-12-20 PROCEDURE — 87086 URINE CULTURE/COLONY COUNT: CPT

## 2017-12-20 RX ORDER — SULFAMETHOXAZOLE AND TRIMETHOPRIM 800; 160 MG/1; MG/1
1 TABLET ORAL 2 TIMES DAILY
Qty: 20 TABLET | Refills: 0 | Status: SHIPPED | OUTPATIENT
Start: 2017-12-20 | End: 2017-12-30

## 2017-12-20 RX ORDER — HYDROCODONE BITARTRATE AND ACETAMINOPHEN 5; 325 MG/1; MG/1
1 TABLET ORAL ONCE
Status: COMPLETED | OUTPATIENT
Start: 2017-12-20 | End: 2017-12-20

## 2017-12-20 RX ORDER — HYDROCODONE BITARTRATE AND ACETAMINOPHEN 5; 325 MG/1; MG/1
1 TABLET ORAL EVERY 6 HOURS PRN
Qty: 10 TABLET | Refills: 0 | Status: SHIPPED | OUTPATIENT
Start: 2017-12-20 | End: 2017-12-27

## 2017-12-20 RX ORDER — ONDANSETRON 4 MG/1
4 TABLET, ORALLY DISINTEGRATING ORAL EVERY 8 HOURS PRN
Qty: 10 TABLET | Refills: 0 | Status: SHIPPED | OUTPATIENT
Start: 2017-12-20 | End: 2018-04-12 | Stop reason: ALTCHOICE

## 2017-12-20 RX ADMIN — HYDROCODONE BITARTRATE AND ACETAMINOPHEN 1 TABLET: 5; 325 TABLET ORAL at 19:06

## 2017-12-20 ASSESSMENT — PAIN SCALES - GENERAL
PAINLEVEL_OUTOF10: 0
PAINLEVEL_OUTOF10: 8

## 2017-12-20 ASSESSMENT — PAIN DESCRIPTION - DESCRIPTORS
DESCRIPTORS: ACHING
DESCRIPTORS: SPASM;PRESSURE

## 2017-12-20 ASSESSMENT — PAIN DESCRIPTION - ONSET: ONSET: PROGRESSIVE

## 2017-12-20 ASSESSMENT — PAIN DESCRIPTION - FREQUENCY: FREQUENCY: CONTINUOUS

## 2017-12-20 ASSESSMENT — PAIN DESCRIPTION - LOCATION
LOCATION: PELVIS
LOCATION: ABDOMEN

## 2017-12-20 ASSESSMENT — PAIN DESCRIPTION - ORIENTATION: ORIENTATION: LEFT;LOWER

## 2017-12-20 ASSESSMENT — PAIN DESCRIPTION - PROGRESSION: CLINICAL_PROGRESSION: GRADUALLY WORSENING

## 2017-12-20 NOTE — ED PROVIDER NOTES
Comment: social    Drug use: No    Sexual activity: Yes     Partners: Male     Other Topics Concern    None     Social History Narrative    ** Merged History Encounter **            SCREENINGS    Oliver Coma Scale  Eye Opening: Spontaneous  Best Verbal Response: Oriented  Best Motor Response: Obeys commands  Oliver Coma Scale Score: 15        PHYSICAL EXAM    (up to 7 for level 4, 8 or more for level 5)     ED Triage Vitals [12/20/17 1840]   BP Temp Temp Source Pulse Resp SpO2 Height Weight   (!) 172/89 99.4 °F (37.4 °C) Oral 95 20 97 % 5' 5\" (1.651 m) 130 lb (59 kg)       Physical Exam   Constitutional: She is oriented to person, place, and time. She appears well-developed. HENT:   Head: Normocephalic. Eyes: Right eye exhibits no discharge. Left eye exhibits discharge. Neck: Neck supple. No tracheal deviation present. No thyromegaly present. Cardiovascular: Normal rate and normal heart sounds. Exam reveals no gallop. No murmur heard. Pulmonary/Chest: Breath sounds normal. No respiratory distress. She has no wheezes. Abdominal: Soft. Bowel sounds are normal. She exhibits no mass. There is tenderness. There is no rebound. Minimal lower abdominal tenderness no rebound tenderness no masses felt no hernia noted   Musculoskeletal: Normal range of motion. She exhibits no edema or tenderness. Lymphadenopathy:     She has no cervical adenopathy. Neurological: She is alert and oriented to person, place, and time. No cranial nerve deficit. She exhibits normal muscle tone. Skin: Skin is warm. No rash noted. No erythema.    Psychiatric: Her behavior is normal. Thought content normal.       DIAGNOSTIC RESULTS     EKG: All EKG's are interpreted by the Emergency Department Physician who either signs or Co-signs this chart in the absence of a cardiologist.        RADIOLOGY:   Non-plain film images such as CT, Ultrasound and MRI are read by the radiologist. Plain radiographic images are visualized and preliminarily interpreted by the emergency physician with the below findings:        Interpretation per the Radiologist below, if available at the time of this note:    CT ABDOMEN PELVIS WO IV CONTRAST Additional Contrast? None   Final Result    IMPRESSION: NO ACUTE PROCESS IN THE ABDOMEN OR PELVIS. NO CHANGE FROM AUGUST 22, 2017. ED BEDSIDE ULTRASOUND:   Performed by ED Physician - none    LABS:  Labs Reviewed   MICROSCOPIC URINALYSIS - Abnormal; Notable for the following:        Result Value    WBC, UA 6-10 (*)     All other components within normal limits   URINE CULTURE   URINE RT REFLEX TO CULTURE       All other labs were within normal range or not returned as of this dictation. EMERGENCY DEPARTMENT COURSE and DIFFERENTIAL DIAGNOSIS/MDM:   Vitals:    Vitals:    12/20/17 1840   BP: (!) 172/89   Pulse: 95   Resp: 20   Temp: 99.4 °F (37.4 °C)   TempSrc: Oral   SpO2: 97%   Weight: 130 lb (59 kg)   Height: 5' 5\" (1.651 m)           MDM    CRITICAL CARE TIME   Total Critical Care time was  minutes, excluding separately reportable procedures. There was a high probability of clinically significant/life threatening deterioration in the patient's condition which required my urgent intervention. LTS:  None    PROCEDURES:  Unless otherwise noted below, none     Procedures    FINAL IMPRESSION      1. Acute cystitis without hematuria          DISPOSITION/PLAN   DISPOSITION Decision To Discharge 12/20/2017 07:42:47 PM      PATIENT REFERRED TO:  Hellen Shaffer CNP  [facility] [address], [address] [facility]  [phone]    In 1 week        DISCHARGE MEDICATIONS:  New Prescriptions    HYDROCODONE-ACETAMINOPHEN (NORCO) 5-325 MG PER TABLET    Take 1 tablet by mouth every 6 hours as needed for Pain .     ONDANSETRON (ZOFRAN ODT) 4 MG DISINTEGRATING TABLET    Take 1 tablet by mouth every 8 hours as needed for Nausea    SULFAMETHOXAZOLE-TRIMETHOPRIM (BACTRIM DS) 800-160 MG PER TABLET    Take 1 tablet by mouth 2 times daily for 10 days          (Please note that portions of this note were completed with a voice recognition program.  Efforts were made to edit the dictations but occasionally words are mis-transcribed.)    Laura Bach MD (electronically signed)  Attending Emergency Physician       Laura Bach MD  12/20/17 1946

## 2017-12-21 RX ORDER — TRAMADOL HYDROCHLORIDE 50 MG/1
100 TABLET ORAL EVERY 8 HOURS PRN
Qty: 60 TABLET | Refills: 0 | Status: SHIPPED | OUTPATIENT
Start: 2017-12-21 | End: 2018-01-08 | Stop reason: SDUPTHER

## 2017-12-22 LAB — URINE CULTURE, ROUTINE: NORMAL

## 2017-12-22 RX ORDER — CONJUGATED ESTROGENS 0.62 MG/G
CREAM VAGINAL
Qty: 1 TUBE | Refills: 4 | Status: SHIPPED | OUTPATIENT
Start: 2017-12-22 | End: 2019-04-30 | Stop reason: ALTCHOICE

## 2018-01-08 DIAGNOSIS — M54.41 CHRONIC RIGHT-SIDED LOW BACK PAIN WITH RIGHT-SIDED SCIATICA: Chronic | ICD-10-CM

## 2018-01-08 DIAGNOSIS — G89.29 CHRONIC RIGHT-SIDED LOW BACK PAIN WITH RIGHT-SIDED SCIATICA: Chronic | ICD-10-CM

## 2018-01-08 RX ORDER — TRAMADOL HYDROCHLORIDE 50 MG/1
100 TABLET ORAL EVERY 8 HOURS PRN
Qty: 60 TABLET | Refills: 0 | Status: SHIPPED | OUTPATIENT
Start: 2018-01-08 | End: 2018-01-19 | Stop reason: SDUPTHER

## 2018-01-08 NOTE — TELEPHONE ENCOUNTER
Patient called said she had an appt with you last week but cancelled because she had the flu she has an appt with you on 1/19/18. cp

## 2018-01-10 ENCOUNTER — TELEPHONE (OUTPATIENT)
Dept: FAMILY MEDICINE CLINIC | Age: 63
End: 2018-01-10

## 2018-01-12 ENCOUNTER — HOSPITAL ENCOUNTER (OUTPATIENT)
Age: 63
Setting detail: SPECIMEN
Discharge: HOME | End: 2018-01-12
Payer: COMMERCIAL

## 2018-01-12 ENCOUNTER — NURSE ONLY (OUTPATIENT)
Dept: FAMILY MEDICINE CLINIC | Age: 63
End: 2018-01-12

## 2018-01-12 ENCOUNTER — TELEPHONE (OUTPATIENT)
Dept: FAMILY MEDICINE CLINIC | Age: 63
End: 2018-01-12

## 2018-01-12 DIAGNOSIS — N30.01 ACUTE CYSTITIS WITH HEMATURIA: Primary | ICD-10-CM

## 2018-01-12 DIAGNOSIS — N39.0 URINARY TRACT INFECTION WITHOUT HEMATURIA, SITE UNSPECIFIED: ICD-10-CM

## 2018-01-12 DIAGNOSIS — N39.0 URINARY TRACT INFECTION WITHOUT HEMATURIA, SITE UNSPECIFIED: Primary | ICD-10-CM

## 2018-01-12 LAB
BILIRUBIN, POC: ABNORMAL
BLOOD URINE, POC: ABNORMAL
CLARITY, POC: ABNORMAL
COLOR, POC: ABNORMAL
GLUCOSE URINE, POC: ABNORMAL
KETONES, POC: ABNORMAL
LEUKOCYTE EST, POC: 500
NITRITE, POC: POSITIVE
PH, POC: 6
PROTEIN, POC: ABNORMAL
SPECIFIC GRAVITY, POC: 1.01
UROBILINOGEN, POC: 35

## 2018-01-12 PROCEDURE — 87086 URINE CULTURE/COLONY COUNT: CPT

## 2018-01-12 PROCEDURE — 81003 URINALYSIS AUTO W/O SCOPE: CPT | Performed by: NURSE PRACTITIONER

## 2018-01-12 RX ORDER — SULFAMETHOXAZOLE AND TRIMETHOPRIM 800; 160 MG/1; MG/1
1 TABLET ORAL 2 TIMES DAILY
Qty: 20 TABLET | Refills: 0 | Status: SHIPPED | OUTPATIENT
Start: 2018-01-12 | End: 2018-01-19 | Stop reason: ALTCHOICE

## 2018-01-14 LAB — URINE CULTURE, ROUTINE: NORMAL

## 2018-01-16 ENCOUNTER — TELEPHONE (OUTPATIENT)
Dept: FAMILY MEDICINE CLINIC | Age: 63
End: 2018-01-16

## 2018-01-16 RX ORDER — CIPROFLOXACIN 250 MG/1
250 TABLET, FILM COATED ORAL 2 TIMES DAILY
Qty: 14 TABLET | Refills: 0 | Status: SHIPPED | OUTPATIENT
Start: 2018-01-16 | End: 2018-01-23

## 2018-01-19 ENCOUNTER — OFFICE VISIT (OUTPATIENT)
Dept: FAMILY MEDICINE CLINIC | Age: 63
End: 2018-01-19

## 2018-01-19 VITALS
HEART RATE: 105 BPM | RESPIRATION RATE: 12 BRPM | DIASTOLIC BLOOD PRESSURE: 70 MMHG | BODY MASS INDEX: 20.99 KG/M2 | OXYGEN SATURATION: 96 % | WEIGHT: 126 LBS | TEMPERATURE: 98.2 F | HEIGHT: 65 IN | SYSTOLIC BLOOD PRESSURE: 128 MMHG

## 2018-01-19 DIAGNOSIS — N32.89 BLADDER SPASM: ICD-10-CM

## 2018-01-19 DIAGNOSIS — K59.03 DRUG-INDUCED CONSTIPATION: ICD-10-CM

## 2018-01-19 DIAGNOSIS — R10.2 CHRONIC PELVIC PAIN IN FEMALE: ICD-10-CM

## 2018-01-19 DIAGNOSIS — G89.29 CHRONIC RIGHT-SIDED LOW BACK PAIN WITH RIGHT-SIDED SCIATICA: Chronic | ICD-10-CM

## 2018-01-19 DIAGNOSIS — G89.29 CHRONIC PELVIC PAIN IN FEMALE: ICD-10-CM

## 2018-01-19 DIAGNOSIS — N39.0 RECURRENT UTI: Primary | ICD-10-CM

## 2018-01-19 DIAGNOSIS — M54.41 CHRONIC RIGHT-SIDED LOW BACK PAIN WITH RIGHT-SIDED SCIATICA: Chronic | ICD-10-CM

## 2018-01-19 DIAGNOSIS — R33.9 URINARY RETENTION: ICD-10-CM

## 2018-01-19 LAB
BILIRUBIN, POC: NORMAL
BLOOD URINE, POC: NORMAL
CLARITY, POC: NORMAL
COLOR, POC: YELLOW
GLUCOSE URINE, POC: NORMAL
KETONES, POC: NORMAL
LEUKOCYTE EST, POC: NORMAL
NITRITE, POC: NORMAL
PH, POC: 6
PROTEIN, POC: NORMAL
SPECIFIC GRAVITY, POC: 1.01
UROBILINOGEN, POC: 3.5

## 2018-01-19 PROCEDURE — G8484 FLU IMMUNIZE NO ADMIN: HCPCS | Performed by: NURSE PRACTITIONER

## 2018-01-19 PROCEDURE — G8420 CALC BMI NORM PARAMETERS: HCPCS | Performed by: NURSE PRACTITIONER

## 2018-01-19 PROCEDURE — 4004F PT TOBACCO SCREEN RCVD TLK: CPT | Performed by: NURSE PRACTITIONER

## 2018-01-19 PROCEDURE — 99214 OFFICE O/P EST MOD 30 MIN: CPT | Performed by: NURSE PRACTITIONER

## 2018-01-19 PROCEDURE — 3017F COLORECTAL CA SCREEN DOC REV: CPT | Performed by: NURSE PRACTITIONER

## 2018-01-19 PROCEDURE — 81003 URINALYSIS AUTO W/O SCOPE: CPT | Performed by: NURSE PRACTITIONER

## 2018-01-19 PROCEDURE — 3014F SCREEN MAMMO DOC REV: CPT | Performed by: NURSE PRACTITIONER

## 2018-01-19 PROCEDURE — G8427 DOCREV CUR MEDS BY ELIG CLIN: HCPCS | Performed by: NURSE PRACTITIONER

## 2018-01-19 RX ORDER — PHENAZOPYRIDINE HYDROCHLORIDE 200 MG/1
TABLET, FILM COATED ORAL
Qty: 90 TABLET | Refills: 1 | Status: SHIPPED | OUTPATIENT
Start: 2018-01-19 | End: 2018-01-29 | Stop reason: SINTOL

## 2018-01-19 RX ORDER — LUBIPROSTONE 8 UG/1
8 CAPSULE, GELATIN COATED ORAL DAILY
Qty: 30 CAPSULE | Refills: 3 | Status: SHIPPED | OUTPATIENT
Start: 2018-01-19 | End: 2018-01-29 | Stop reason: ALTCHOICE

## 2018-01-19 RX ORDER — TRAMADOL HYDROCHLORIDE 50 MG/1
100 TABLET ORAL EVERY 8 HOURS PRN
Qty: 120 TABLET | Refills: 1 | Status: SHIPPED | OUTPATIENT
Start: 2018-01-19 | End: 2018-02-18

## 2018-01-19 RX ORDER — TAMSULOSIN HYDROCHLORIDE 0.4 MG/1
0.4 CAPSULE ORAL DAILY
Qty: 30 CAPSULE | Refills: 3 | Status: SHIPPED | OUTPATIENT
Start: 2018-01-19 | End: 2018-02-09 | Stop reason: SINTOL

## 2018-01-19 ASSESSMENT — PATIENT HEALTH QUESTIONNAIRE - PHQ9
SUM OF ALL RESPONSES TO PHQ QUESTIONS 1-9: 2
1. LITTLE INTEREST OR PLEASURE IN DOING THINGS: 1
SUM OF ALL RESPONSES TO PHQ9 QUESTIONS 1 & 2: 2
2. FEELING DOWN, DEPRESSED OR HOPELESS: 1

## 2018-01-26 ENCOUNTER — TELEPHONE (OUTPATIENT)
Dept: FAMILY MEDICINE CLINIC | Age: 63
End: 2018-01-26

## 2018-01-26 DIAGNOSIS — K59.09 CHRONIC CONSTIPATION: Primary | ICD-10-CM

## 2018-01-29 NOTE — TELEPHONE ENCOUNTER
Pt is aware of message. However ,  pt wants to know if there is anything else you can give to her for laxative ? Her insurance will not cover the last script you gave her .  Please advise

## 2018-01-31 LAB — URINE CULTURE, ROUTINE: NORMAL

## 2018-02-09 ENCOUNTER — OFFICE VISIT (OUTPATIENT)
Dept: FAMILY MEDICINE CLINIC | Age: 63
End: 2018-02-09
Payer: COMMERCIAL

## 2018-02-09 VITALS
BODY MASS INDEX: 20.3 KG/M2 | SYSTOLIC BLOOD PRESSURE: 120 MMHG | DIASTOLIC BLOOD PRESSURE: 80 MMHG | TEMPERATURE: 97.9 F | WEIGHT: 122 LBS | HEART RATE: 116 BPM | OXYGEN SATURATION: 96 %

## 2018-02-09 DIAGNOSIS — R10.2 PELVIC PAIN IN FEMALE: Primary | ICD-10-CM

## 2018-02-09 DIAGNOSIS — R10.32 LLQ ABDOMINAL PAIN: ICD-10-CM

## 2018-02-09 DIAGNOSIS — K59.03 DRUG-INDUCED CONSTIPATION: ICD-10-CM

## 2018-02-09 DIAGNOSIS — N89.8 VAGINAL IRRITATION: ICD-10-CM

## 2018-02-09 PROCEDURE — G8428 CUR MEDS NOT DOCUMENT: HCPCS | Performed by: NURSE PRACTITIONER

## 2018-02-09 PROCEDURE — 3017F COLORECTAL CA SCREEN DOC REV: CPT | Performed by: NURSE PRACTITIONER

## 2018-02-09 PROCEDURE — 3014F SCREEN MAMMO DOC REV: CPT | Performed by: NURSE PRACTITIONER

## 2018-02-09 PROCEDURE — 99214 OFFICE O/P EST MOD 30 MIN: CPT | Performed by: NURSE PRACTITIONER

## 2018-02-09 PROCEDURE — 4004F PT TOBACCO SCREEN RCVD TLK: CPT | Performed by: NURSE PRACTITIONER

## 2018-02-09 PROCEDURE — G8420 CALC BMI NORM PARAMETERS: HCPCS | Performed by: NURSE PRACTITIONER

## 2018-02-09 PROCEDURE — G8484 FLU IMMUNIZE NO ADMIN: HCPCS | Performed by: NURSE PRACTITIONER

## 2018-02-09 RX ORDER — LUBIPROSTONE 8 UG/1
8 CAPSULE, GELATIN COATED ORAL DAILY
Qty: 30 CAPSULE | Refills: 3 | Status: SHIPPED | OUTPATIENT
Start: 2018-02-09 | End: 2018-03-12 | Stop reason: SDUPTHER

## 2018-02-09 RX ORDER — METRONIDAZOLE 500 MG/1
500 TABLET ORAL 3 TIMES DAILY
Qty: 30 TABLET | Refills: 0 | Status: SHIPPED | OUTPATIENT
Start: 2018-02-09 | End: 2018-02-19

## 2018-02-09 RX ORDER — CIPROFLOXACIN 500 MG/1
500 TABLET, FILM COATED ORAL 2 TIMES DAILY
Qty: 20 TABLET | Refills: 0 | Status: SHIPPED | OUTPATIENT
Start: 2018-02-09 | End: 2018-02-19

## 2018-02-09 RX ORDER — SULFAMETHOXAZOLE AND TRIMETHOPRIM 400; 80 MG/1; MG/1
TABLET ORAL
Refills: 11 | COMMUNITY
Start: 2018-01-29 | End: 2019-02-21 | Stop reason: SDUPTHER

## 2018-02-09 RX ORDER — CLOBETASOL PROPIONATE 0.05 MG/G
1 GEL TOPICAL 2 TIMES DAILY
Qty: 1 TUBE | Refills: 2 | Status: SHIPPED | OUTPATIENT
Start: 2018-02-09 | End: 2018-06-11 | Stop reason: SDUPTHER

## 2018-02-09 NOTE — PROGRESS NOTES
neck- supple, no mass, non-tender and no bruits  Lungs:  Normal expansion. Clear to auscultation. No rales, rhonchi, or wheezing., No chest wall tenderness. Heart:  Heart sounds are normal.  Regular rate and rhythm without murmur, gallop or rub. Abdomen:  Soft, tender to all quadrants,  normal bowel sounds. No bruits, organomegaly or masses. There is no costovertebral angle tenderness. Lumbar spine and sacroiliac joints are non tender. There is no edema in the four extremities. Pulses palpable at both posterior tibial and radial arteries. DIAGNOSIS:   1. Pelvic pain in female     2. Drug-induced constipation  lubiprostone (AMITIZA) 8 MCG CAPS capsule   3. Vaginal irritation  clobetasol propionate (TEMOVATE) 0.05 % GEL gel   4. LLQ abdominal pain  ciprofloxacin (CIPRO) 500 MG tablet    metroNIDAZOLE (FLAGYL) 500 MG tablet    CBC Auto Differential    Comprehensive Metabolic Panel         PLAN: Include orders in the DX section. Follow up in the next month with me. Will plan for MRI of the pelvis and if unsuccessful, may recommend referral to general surgery. Discussed referral to Dr. Claudia Perez in the future. Patient concerned that mesh can be causing chronic pelvic pain. Amitiza started in place of linzess. Prior authorization was approved. Will treat for possible diverticulitis. Records to be requested from her urologist to get current scan report. The patient is instructed to take Probiotic tablets twice a day for the duration of antibiotic therapy and for 4 days after completion of antibiotics. This will help restore the good bacteria to your colon and prevent side effects of antibiotic therapy such as cramping and diarrhea. Probiotic tablets can be found at your local pharmacy over the counter. Ask your pharmacist if you need help finding tablets. This patient has been living with significant symptoms over the past few years.    Discussed that I will continue to help her try to find answers but that a specialist referral is likely needed. She has seen GYN and Urology but has not seen general surgery for this per her report.      Electronically signed by Tom Padilla, 7:29 AM 2/12/18

## 2018-03-12 ENCOUNTER — OFFICE VISIT (OUTPATIENT)
Dept: FAMILY MEDICINE CLINIC | Age: 63
End: 2018-03-12
Payer: COMMERCIAL

## 2018-03-12 ENCOUNTER — HOSPITAL ENCOUNTER (OUTPATIENT)
Age: 63
Setting detail: SPECIMEN
Discharge: HOME | End: 2018-03-12
Payer: MEDICARE

## 2018-03-12 VITALS
SYSTOLIC BLOOD PRESSURE: 114 MMHG | WEIGHT: 122 LBS | RESPIRATION RATE: 12 BRPM | DIASTOLIC BLOOD PRESSURE: 60 MMHG | HEART RATE: 114 BPM | OXYGEN SATURATION: 97 % | HEIGHT: 65 IN | TEMPERATURE: 98.9 F | BODY MASS INDEX: 20.33 KG/M2

## 2018-03-12 DIAGNOSIS — K59.03 DRUG-INDUCED CONSTIPATION: ICD-10-CM

## 2018-03-12 DIAGNOSIS — R10.2 CHRONIC PELVIC PAIN IN FEMALE: Primary | ICD-10-CM

## 2018-03-12 DIAGNOSIS — R10.9 CHRONIC ABDOMINAL PAIN: ICD-10-CM

## 2018-03-12 DIAGNOSIS — G89.29 CHRONIC PELVIC PAIN IN FEMALE: Primary | ICD-10-CM

## 2018-03-12 DIAGNOSIS — R31.9 URINARY TRACT INFECTION WITH HEMATURIA, SITE UNSPECIFIED: ICD-10-CM

## 2018-03-12 DIAGNOSIS — Z79.899 HIGH RISK MEDICATION USE: ICD-10-CM

## 2018-03-12 DIAGNOSIS — Z13.6 SCREENING, ISCHEMIC HEART DISEASE: ICD-10-CM

## 2018-03-12 DIAGNOSIS — N39.0 URINARY TRACT INFECTION WITH HEMATURIA, SITE UNSPECIFIED: ICD-10-CM

## 2018-03-12 DIAGNOSIS — G89.29 CHRONIC ABDOMINAL PAIN: ICD-10-CM

## 2018-03-12 DIAGNOSIS — F17.200 CURRENT SMOKER: ICD-10-CM

## 2018-03-12 LAB
BILIRUBIN, POC: 70
BLOOD URINE, POC: ABNORMAL
CLARITY, POC: ABNORMAL
COLOR, POC: ABNORMAL
GLUCOSE URINE, POC: 15
KETONES, POC: 0.5
LEUKOCYTE EST, POC: 500
NITRITE, POC: POSITIVE
PH, POC: 5
PROTEIN, POC: 15
SPECIFIC GRAVITY, POC: 1.02
UROBILINOGEN, POC: 140

## 2018-03-12 PROCEDURE — 81003 URINALYSIS AUTO W/O SCOPE: CPT | Performed by: NURSE PRACTITIONER

## 2018-03-12 PROCEDURE — 80307 DRUG TEST PRSMV CHEM ANLYZR: CPT

## 2018-03-12 PROCEDURE — 87086 URINE CULTURE/COLONY COUNT: CPT

## 2018-03-12 PROCEDURE — 99214 OFFICE O/P EST MOD 30 MIN: CPT | Performed by: NURSE PRACTITIONER

## 2018-03-12 RX ORDER — LUBIPROSTONE 24 UG/1
24 CAPSULE, GELATIN COATED ORAL DAILY
Qty: 30 CAPSULE | Refills: 2 | Status: SHIPPED | OUTPATIENT
Start: 2018-03-12 | End: 2018-04-12 | Stop reason: SDUPTHER

## 2018-03-12 RX ORDER — TRAMADOL HYDROCHLORIDE 50 MG/1
TABLET ORAL
Refills: 1 | Status: CANCELLED | OUTPATIENT
Start: 2018-03-12

## 2018-03-12 RX ORDER — TRAMADOL HYDROCHLORIDE 50 MG/1
TABLET ORAL
Refills: 1 | COMMUNITY
Start: 2018-02-21 | End: 2018-03-12 | Stop reason: SDUPTHER

## 2018-03-12 RX ORDER — PREGABALIN 75 MG/1
75 CAPSULE ORAL 3 TIMES DAILY
Qty: 60 CAPSULE | Refills: 0 | Status: SHIPPED | OUTPATIENT
Start: 2018-03-12 | End: 2018-05-15 | Stop reason: SDUPTHER

## 2018-03-12 RX ORDER — SULFAMETHOXAZOLE AND TRIMETHOPRIM 800; 160 MG/1; MG/1
1 TABLET ORAL 2 TIMES DAILY
Qty: 20 TABLET | Refills: 0 | Status: SHIPPED | OUTPATIENT
Start: 2018-03-12 | End: 2018-03-22

## 2018-03-12 RX ORDER — TRAMADOL HYDROCHLORIDE 50 MG/1
TABLET ORAL
Qty: 120 TABLET | Refills: 2 | Status: SHIPPED | OUTPATIENT
Start: 2018-03-12 | End: 2018-06-08 | Stop reason: SDUPTHER

## 2018-03-12 NOTE — PROGRESS NOTES
Chief Complaint   Patient presents with    Follow-up     pt is following up on abdominal pain and constipation. pt is still having alot of cramping.  Discuss Medications     pt would like to discuss gabapentin       HPI: Alexander Elizabeth is a 58 y.o. female presenting for follow-up of chronic abdominal and pelvic pain. She is scheduled for MRI later this week. Sees a chiropractor for low back pain. Follows up with urology on April 2. Is on low dose bactrim. Amitiza has not been working. Low back pain: she states that there has been no change in her low back pain. Chiropractor told her that low back pain is not from bladder or that low back pain is not affecting bladder. She cannot remember what exactly. She takes ultram for the chronic pain. She has seen pain management in the past. Injections have not helped. Chronic low back pain started after she had intra-abdominal mesh placed by Dr. Nichol Pierre a few years ago. Chronic constipation: she states that she goes to the bathroom about every 4 days. Pola Hernandez has not been helping. Maybe helps more than the linzess did but not much better. Has not had luck with laxatives or stool softeners or increased fiber intake. She drinks 64 ounces of water per day on average. Chronic pelvic pain: no change in chronic pelvic pain symptoms. Cannot tell if coming from back or front going to back. Has never been able to get answers. Has seen urology, gynecology and general surgery without known cause of chronic symptoms. She is frustrated that a diagnosis is never made. She had to stop taking Neurontin because it was giving her vivid and disturbing dreams. Recurrent UTI: she feels that she has a UTI now. She is taking low dose bactrim every day and sees urology again next month. Severe burning pain right before, during and right after urination. No vaginal discharge or discomfort. ROS: This patient reports no chest pains or pressure.   There is no shortness of breath or chest wall soreness. I have reviewed the following diagnostic data: urinalysis today is abnormal.     EXAM:  Constitutional Blood pressure 114/60, pulse 114, temperature 98.9 °F (37.2 °C), temperature source Temporal, resp. rate 12, height 5' 5\" (1.651 m), weight 122 lb (55.3 kg), SpO2 97 %, not currently breastfeeding. .  She has a normal affect, no acute distress, appears well developed and well nourished. Neck:  neck- supple, no mass, non-tender and no bruits  Lungs:  Normal expansion. Clear to auscultation. No rales, rhonchi, or wheezing., No chest wall tenderness. Heart:  Heart sounds are normal.  Regular rate and rhythm without murmur, gallop or rub. Abdomen:  Soft, non-tender, normal bowel sounds. No bruits, organomegaly or masses. Extremities: Extremities warm to touch, pink, with no edema. DIAGNOSIS:   1. Chronic pelvic pain in female  traMADol (ULTRAM) 50 MG tablet    CBC Auto Differential    Comprehensive Metabolic Panel    Ambulatory referral to General Surgery   2. Current smoker     3. High risk medication use  Pain Management Drug Screen   4. Drug-induced constipation  lubiprostone (AMITIZA) 24 MCG capsule   5. Screening, ischemic heart disease  Lipid Panel   6. Chronic abdominal pain  Ambulatory referral to General Surgery   7. Urinary tract infection with hematuria, site unspecified  sulfamethoxazole-trimethoprim (BACTRIM DS) 800-160 MG per tablet    POCT Urinalysis No Micro (Auto)    Urine Culture         PLAN: Include orders in the DX section. Follow up: 1 months and as needed. Blood work one week prior as ordered. Bactrim DS ordered for now. Urine culture sent. Stop low dose bactrim for these 10 days. Amitiza increased to 24 mcg for chronic constipation-made worse with pain medication. Ultram refill given. lyrica started. Had vivid dreams with gabapentin. MRI abdomen/pelvis scheduled for later this week. Referral given to general surgery.  There

## 2018-03-14 LAB — URINE CULTURE, ROUTINE: NORMAL

## 2018-03-15 LAB
6-ACETYLMORPHINE: NOT DETECTED
7-AMINOCLONAZEPAM: NOT DETECTED
ALPHA-OH-ALPRAZOLAM: NOT DETECTED
ALPRAZOLAM: NOT DETECTED
AMPHETAMINE: NOT DETECTED
BARBITURATES: NOT DETECTED
BENZOYLECGONINE: NOT DETECTED
BUPRENORPHINE: NOT DETECTED
CARISOPRODOL: NOT DETECTED
CLONAZEPAM: NOT DETECTED
CODEINE: NOT DETECTED
CREATININE URINE: 116.6 MG/DL (ref 20–400)
DIAZEPAM: NOT DETECTED
EER PAIN MGT DRUG PANEL, HIGH RES/EMIT U: NORMAL
ETHYL GLUCURONIDE: NOT DETECTED
FENTANYL: NOT DETECTED
HYDROCODONE: NOT DETECTED
HYDROMORPHONE: NOT DETECTED
LORAZEPAM: NOT DETECTED
MARIJUANA METABOLITE: NOT DETECTED
MDA: NOT DETECTED
MDEA: NOT DETECTED
MDMA URINE: NOT DETECTED
MEPERIDINE: NOT DETECTED
METHADONE: NOT DETECTED
METHAMPHETAMINE: NOT DETECTED
METHYLPHENIDATE: NOT DETECTED
MIDAZOLAM: NOT DETECTED
MORPHINE: NOT DETECTED
NORBUPRENORPHINE, FREE: NOT DETECTED
NORDIAZEPAM: NOT DETECTED
NORFENTANYL: NOT DETECTED
NORHYDROCODONE, URINE: NOT DETECTED
NOROXYCODONE: NOT DETECTED
NOROXYMORPHONE, URINE: NOT DETECTED
OXAZEPAM: NOT DETECTED
OXYCODONE: NOT DETECTED
OXYMORPHONE: NOT DETECTED
PAIN MANAGEMENT DRUG PANEL: NORMAL
PCP: NOT DETECTED
PHENTERMINE: NOT DETECTED
PROPOXYPHENE: NOT DETECTED
TAPENTADOL, URINE: NOT DETECTED
TAPENTADOL-O-SULFATE, URINE: NOT DETECTED
TEMAZEPAM: NOT DETECTED
TRAMADOL: PRESENT
ZOLPIDEM: NOT DETECTED

## 2018-03-22 ENCOUNTER — HOSPITAL ENCOUNTER (OUTPATIENT)
Dept: MRI IMAGING | Age: 63
Discharge: HOME | End: 2018-03-24
Payer: MEDICARE

## 2018-03-22 DIAGNOSIS — G89.29 CHRONIC PELVIC PAIN IN FEMALE: ICD-10-CM

## 2018-03-22 DIAGNOSIS — R10.2 CHRONIC PELVIC PAIN IN FEMALE: ICD-10-CM

## 2018-03-22 PROCEDURE — 72195 MRI PELVIS W/O DYE: CPT

## 2018-04-12 ENCOUNTER — OFFICE VISIT (OUTPATIENT)
Dept: FAMILY MEDICINE CLINIC | Age: 63
End: 2018-04-12
Payer: MEDICARE

## 2018-04-12 VITALS
TEMPERATURE: 98.5 F | SYSTOLIC BLOOD PRESSURE: 114 MMHG | WEIGHT: 123 LBS | HEART RATE: 96 BPM | BODY MASS INDEX: 20.49 KG/M2 | HEIGHT: 65 IN | DIASTOLIC BLOOD PRESSURE: 62 MMHG | RESPIRATION RATE: 14 BRPM | OXYGEN SATURATION: 96 %

## 2018-04-12 DIAGNOSIS — N89.8 VAGINAL DISCHARGE: ICD-10-CM

## 2018-04-12 DIAGNOSIS — R10.2 CHRONIC PELVIC PAIN IN FEMALE: Primary | ICD-10-CM

## 2018-04-12 DIAGNOSIS — N39.0 URINARY TRACT INFECTION WITHOUT HEMATURIA, SITE UNSPECIFIED: ICD-10-CM

## 2018-04-12 DIAGNOSIS — K59.00 CONSTIPATION, UNSPECIFIED CONSTIPATION TYPE: ICD-10-CM

## 2018-04-12 DIAGNOSIS — K59.03 DRUG-INDUCED CONSTIPATION: ICD-10-CM

## 2018-04-12 DIAGNOSIS — R10.2 VAGINAL PAIN: ICD-10-CM

## 2018-04-12 DIAGNOSIS — F17.200 CURRENT SMOKER: ICD-10-CM

## 2018-04-12 DIAGNOSIS — Z12.31 ENCOUNTER FOR SCREENING MAMMOGRAM FOR BREAST CANCER: ICD-10-CM

## 2018-04-12 DIAGNOSIS — G89.29 CHRONIC PELVIC PAIN IN FEMALE: Primary | ICD-10-CM

## 2018-04-12 PROCEDURE — 81003 URINALYSIS AUTO W/O SCOPE: CPT | Performed by: NURSE PRACTITIONER

## 2018-04-12 PROCEDURE — 99214 OFFICE O/P EST MOD 30 MIN: CPT | Performed by: NURSE PRACTITIONER

## 2018-04-12 RX ORDER — LUBIPROSTONE 24 UG/1
24 CAPSULE, GELATIN COATED ORAL DAILY
Qty: 30 CAPSULE | Refills: 2 | Status: SHIPPED | OUTPATIENT
Start: 2018-04-12 | End: 2018-08-14 | Stop reason: SDUPTHER

## 2018-04-16 ENCOUNTER — OFFICE VISIT (OUTPATIENT)
Dept: SURGERY | Age: 63
End: 2018-04-16
Payer: MEDICARE

## 2018-04-16 VITALS
OXYGEN SATURATION: 96 % | HEART RATE: 117 BPM | SYSTOLIC BLOOD PRESSURE: 110 MMHG | DIASTOLIC BLOOD PRESSURE: 80 MMHG | TEMPERATURE: 98.7 F | BODY MASS INDEX: 20.8 KG/M2 | WEIGHT: 125 LBS

## 2018-04-16 DIAGNOSIS — R10.84 ABDOMINAL PAIN, CHRONIC, GENERALIZED: ICD-10-CM

## 2018-04-16 DIAGNOSIS — G89.29 ABDOMINAL PAIN, CHRONIC, GENERALIZED: ICD-10-CM

## 2018-04-16 PROCEDURE — 99213 OFFICE O/P EST LOW 20 MIN: CPT | Performed by: SURGERY

## 2018-04-18 PROBLEM — R10.84 ABDOMINAL PAIN, CHRONIC, GENERALIZED: Status: ACTIVE | Noted: 2018-04-18

## 2018-04-18 PROBLEM — G89.29 ABDOMINAL PAIN, CHRONIC, GENERALIZED: Status: ACTIVE | Noted: 2018-04-18

## 2018-04-26 ENCOUNTER — HOSPITAL ENCOUNTER (OUTPATIENT)
Dept: WOMENS IMAGING | Age: 63
Discharge: HOME | End: 2018-04-28
Payer: MEDICARE

## 2018-04-26 DIAGNOSIS — Z12.31 ENCOUNTER FOR SCREENING MAMMOGRAM FOR BREAST CANCER: ICD-10-CM

## 2018-04-26 PROCEDURE — 77067 SCR MAMMO BI INCL CAD: CPT

## 2018-05-15 RX ORDER — PREGABALIN 75 MG/1
75 CAPSULE ORAL 3 TIMES DAILY
Qty: 60 CAPSULE | Refills: 0 | Status: SHIPPED | OUTPATIENT
Start: 2018-05-15 | End: 2018-06-29 | Stop reason: SDUPTHER

## 2018-06-08 DIAGNOSIS — G89.29 CHRONIC PELVIC PAIN IN FEMALE: ICD-10-CM

## 2018-06-08 DIAGNOSIS — R10.2 CHRONIC PELVIC PAIN IN FEMALE: ICD-10-CM

## 2018-06-08 RX ORDER — TRAMADOL HYDROCHLORIDE 50 MG/1
TABLET ORAL
Qty: 120 TABLET | Refills: 0 | Status: SHIPPED | OUTPATIENT
Start: 2018-06-08 | End: 2018-06-29 | Stop reason: SDUPTHER

## 2018-06-11 ENCOUNTER — HOSPITAL ENCOUNTER (OUTPATIENT)
Age: 63
Setting detail: SPECIMEN
Discharge: HOME | End: 2018-06-11
Payer: MEDICARE

## 2018-06-11 ENCOUNTER — OFFICE VISIT (OUTPATIENT)
Dept: FAMILY MEDICINE CLINIC | Age: 63
End: 2018-06-11
Payer: MEDICARE

## 2018-06-11 VITALS
OXYGEN SATURATION: 98 % | HEIGHT: 65 IN | WEIGHT: 130 LBS | SYSTOLIC BLOOD PRESSURE: 118 MMHG | HEART RATE: 87 BPM | DIASTOLIC BLOOD PRESSURE: 68 MMHG | TEMPERATURE: 98.7 F | BODY MASS INDEX: 21.66 KG/M2 | RESPIRATION RATE: 14 BRPM

## 2018-06-11 DIAGNOSIS — L98.9 SKIN LESION OF BACK: ICD-10-CM

## 2018-06-11 DIAGNOSIS — N89.8 VAGINAL IRRITATION: ICD-10-CM

## 2018-06-11 DIAGNOSIS — F17.200 CURRENT SMOKER: ICD-10-CM

## 2018-06-11 DIAGNOSIS — Z71.6 ENCOUNTER FOR SMOKING CESSATION COUNSELING: ICD-10-CM

## 2018-06-11 DIAGNOSIS — R10.2 CHRONIC PELVIC PAIN IN FEMALE: ICD-10-CM

## 2018-06-11 DIAGNOSIS — G89.29 CHRONIC PELVIC PAIN IN FEMALE: ICD-10-CM

## 2018-06-11 DIAGNOSIS — R30.0 DYSURIA: Primary | ICD-10-CM

## 2018-06-11 PROCEDURE — 87086 URINE CULTURE/COLONY COUNT: CPT

## 2018-06-11 PROCEDURE — 81003 URINALYSIS AUTO W/O SCOPE: CPT | Performed by: NURSE PRACTITIONER

## 2018-06-11 PROCEDURE — 99214 OFFICE O/P EST MOD 30 MIN: CPT | Performed by: NURSE PRACTITIONER

## 2018-06-11 RX ORDER — CLOBETASOL PROPIONATE 0.05 MG/G
1 GEL TOPICAL 2 TIMES DAILY
Qty: 1 TUBE | Refills: 2 | Status: SHIPPED | OUTPATIENT
Start: 2018-06-11 | End: 2019-11-26 | Stop reason: ALTCHOICE

## 2018-06-13 LAB — URINE CULTURE, ROUTINE: NORMAL

## 2018-06-29 DIAGNOSIS — R10.2 CHRONIC PELVIC PAIN IN FEMALE: ICD-10-CM

## 2018-06-29 DIAGNOSIS — G89.29 CHRONIC PELVIC PAIN IN FEMALE: ICD-10-CM

## 2018-06-29 RX ORDER — PREGABALIN 75 MG/1
75 CAPSULE ORAL 3 TIMES DAILY
Qty: 60 CAPSULE | Refills: 0 | Status: SHIPPED | OUTPATIENT
Start: 2018-06-29 | End: 2018-07-30 | Stop reason: SDUPTHER

## 2018-06-29 RX ORDER — TRAMADOL HYDROCHLORIDE 50 MG/1
TABLET ORAL
Qty: 120 TABLET | Refills: 0 | Status: SHIPPED | OUTPATIENT
Start: 2018-06-29 | End: 2018-07-24 | Stop reason: SDUPTHER

## 2018-07-16 ENCOUNTER — OFFICE VISIT (OUTPATIENT)
Dept: SURGERY | Age: 63
End: 2018-07-16
Payer: MEDICARE

## 2018-07-16 VITALS
DIASTOLIC BLOOD PRESSURE: 78 MMHG | WEIGHT: 133 LBS | BODY MASS INDEX: 22.16 KG/M2 | TEMPERATURE: 98.3 F | SYSTOLIC BLOOD PRESSURE: 122 MMHG | HEIGHT: 65 IN

## 2018-07-16 DIAGNOSIS — D48.9 NEOPLASM, UNCERTAIN WHETHER BENIGN OR MALIGNANT: Primary | ICD-10-CM

## 2018-07-16 PROCEDURE — 99212 OFFICE O/P EST SF 10 MIN: CPT | Performed by: SURGERY

## 2018-07-24 DIAGNOSIS — G89.29 CHRONIC PELVIC PAIN IN FEMALE: ICD-10-CM

## 2018-07-24 DIAGNOSIS — R10.2 CHRONIC PELVIC PAIN IN FEMALE: ICD-10-CM

## 2018-07-24 RX ORDER — TRAMADOL HYDROCHLORIDE 50 MG/1
TABLET ORAL
Qty: 120 TABLET | Refills: 0 | Status: SHIPPED | OUTPATIENT
Start: 2018-07-24 | End: 2018-08-14 | Stop reason: SDUPTHER

## 2018-07-30 DIAGNOSIS — G89.29 CHRONIC PELVIC PAIN IN FEMALE: ICD-10-CM

## 2018-07-30 DIAGNOSIS — R10.2 CHRONIC PELVIC PAIN IN FEMALE: ICD-10-CM

## 2018-07-30 RX ORDER — PREGABALIN 75 MG/1
75 CAPSULE ORAL 3 TIMES DAILY
Qty: 60 CAPSULE | Refills: 0 | Status: SHIPPED | OUTPATIENT
Start: 2018-07-30 | End: 2018-08-14 | Stop reason: SDUPTHER

## 2018-08-14 DIAGNOSIS — R10.2 CHRONIC PELVIC PAIN IN FEMALE: ICD-10-CM

## 2018-08-14 DIAGNOSIS — G89.29 CHRONIC PELVIC PAIN IN FEMALE: ICD-10-CM

## 2018-08-14 DIAGNOSIS — K59.03 DRUG-INDUCED CONSTIPATION: ICD-10-CM

## 2018-08-14 RX ORDER — PREGABALIN 75 MG/1
75 CAPSULE ORAL 3 TIMES DAILY
Qty: 60 CAPSULE | Refills: 0 | Status: SHIPPED | OUTPATIENT
Start: 2018-08-14 | End: 2018-09-25 | Stop reason: SDUPTHER

## 2018-08-14 RX ORDER — LUBIPROSTONE 24 UG/1
24 CAPSULE, GELATIN COATED ORAL DAILY
Qty: 30 CAPSULE | Refills: 2 | Status: SHIPPED | OUTPATIENT
Start: 2018-08-14 | End: 2019-01-04 | Stop reason: SDUPTHER

## 2018-08-14 RX ORDER — TRAMADOL HYDROCHLORIDE 50 MG/1
TABLET ORAL
Qty: 120 TABLET | Refills: 0 | Status: SHIPPED | OUTPATIENT
Start: 2018-08-14 | End: 2018-09-06 | Stop reason: SDUPTHER

## 2018-08-14 NOTE — TELEPHONE ENCOUNTER
Patient requesting medication refill. Please approve or deny this request. Patient wants to get medications this week while she has the funds, please advise. Patient is getting labs done tomorrow. Rx requested:  Requested Prescriptions     Pending Prescriptions Disp Refills    lubiprostone (AMITIZA) 24 MCG capsule 30 capsule 2     Sig: Take 1 capsule by mouth daily    pregabalin (LYRICA) 75 MG capsule 60 capsule 0     Sig: Take 1 capsule by mouth 3 times daily for 30 days. Minus Juliette traMADol (ULTRAM) 50 MG tablet 120 tablet 0     Sig: TAKE 2 TABLETS BY MOUTH EVERY 8 HOURS AS NEEDED FOR PAIN.        Last Office Visit:   6/11/2018    Last Labs:  6/11/2018    Next Visit Date:  Future Appointments  Date Time Provider Italo Garcia   8/20/2018 1:00 PM Andrei Lopes MD [address] [facility] [facility]   8/23/2018 11:15 AM Kasi Gonzales DO OB/GYN [facility]

## 2018-08-19 ENCOUNTER — OFFICE VISIT (OUTPATIENT)
Dept: FAMILY MEDICINE CLINIC | Age: 63
End: 2018-08-19
Payer: MEDICARE

## 2018-08-19 ENCOUNTER — HOSPITAL ENCOUNTER (OUTPATIENT)
Age: 63
Setting detail: SPECIMEN
Discharge: HOME | End: 2018-08-19
Payer: MEDICARE

## 2018-08-19 VITALS
HEART RATE: 100 BPM | BODY MASS INDEX: 22.66 KG/M2 | OXYGEN SATURATION: 98 % | SYSTOLIC BLOOD PRESSURE: 118 MMHG | DIASTOLIC BLOOD PRESSURE: 70 MMHG | RESPIRATION RATE: 16 BRPM | HEIGHT: 65 IN | WEIGHT: 136 LBS | TEMPERATURE: 96.8 F

## 2018-08-19 DIAGNOSIS — R30.0 DYSURIA: ICD-10-CM

## 2018-08-19 DIAGNOSIS — N30.00 ACUTE CYSTITIS WITHOUT HEMATURIA: ICD-10-CM

## 2018-08-19 DIAGNOSIS — N30.00 ACUTE CYSTITIS WITHOUT HEMATURIA: Primary | ICD-10-CM

## 2018-08-19 LAB
BILIRUBIN, POC: NORMAL
BLOOD URINE, POC: NORMAL
CLARITY, POC: NORMAL
COLOR, POC: NORMAL
GLUCOSE URINE, POC: NORMAL
KETONES, POC: NORMAL
LEUKOCYTE EST, POC: NORMAL
NITRITE, POC: NORMAL
PH, POC: 5.5
PROTEIN, POC: NORMAL
SPECIFIC GRAVITY, POC: 1
UROBILINOGEN, POC: NORMAL

## 2018-08-19 PROCEDURE — 81002 URINALYSIS NONAUTO W/O SCOPE: CPT | Performed by: NURSE PRACTITIONER

## 2018-08-19 PROCEDURE — 87086 URINE CULTURE/COLONY COUNT: CPT

## 2018-08-19 PROCEDURE — 99213 OFFICE O/P EST LOW 20 MIN: CPT | Performed by: NURSE PRACTITIONER

## 2018-08-19 RX ORDER — CIPROFLOXACIN 500 MG/1
500 TABLET, FILM COATED ORAL 2 TIMES DAILY
Qty: 14 TABLET | Refills: 0 | Status: SHIPPED | OUTPATIENT
Start: 2018-08-19 | End: 2018-08-26

## 2018-08-19 ASSESSMENT — ENCOUNTER SYMPTOMS
VOMITING: 0
WHEEZING: 0
COUGH: 0
ABDOMINAL PAIN: 0
NAUSEA: 0
SHORTNESS OF BREATH: 0
DIARRHEA: 0

## 2018-08-19 NOTE — PROGRESS NOTES
hours as needed for Wheezing or Shortness of Breath 1 Inhaler 3    vitamin D (CHOLECALCIFEROL) 1000 UNIT TABS tablet Take 1,000 Units by mouth daily.  calcium-vitamin D (OSCAL 500/200 D-3) 500-200 MG-UNIT per tablet Take 1 tablet by mouth 2 times daily.  therapeutic multivitamin-minerals (THERAGRAN-M) tablet Take 1 tablet by mouth daily. No current facility-administered medications on file prior to visit. Past Surgical History:   Procedure Laterality Date    COLONOSCOPY      CYSTOSCOPY N/A 2014    x2    FOREIGN BODY REMOVAL Right 04/21/15    PARTIAL, GROIN    HERNIA REPAIR      double hernia oct.  2 2013    HYSTERECTOMY  7/14/11    bso Judye Patella    KIDNEY STONE SURGERY      x2    OTHER SURGICAL HISTORY      laser surgery facundo dx    [place] COLON CA SCRN NOT HI RSK IND N/A 4/19/2017    COLONOSCOPY performed by Kenia Grant MD at [address] N/A 10/19/2016    ANAL PROCTO SIGMOIDOSCOPY RIGID / excision perineal nodule performed by Daren Link MD at [address] [facility]       Family History   Problem Relation Age of Onset    Heart Disease Mother     Other Mother         dementia    Arthritis Father      Social History     Social History    Marital status:      Spouse name: N/A    Number of children: N/A    Years of education: N/A     Occupational History    cook      Social History Main Topics    Smoking status: Current Every Day Smoker     Packs/day: 0.50     Years: 20.00     Types: Cigarettes    Smokeless tobacco: Never Used      Comment: relaxes me    Alcohol use 3.0 oz/week     5 Cans of beer per week      Comment: social    Drug use: No    Sexual activity: Yes     Partners: Male     Other Topics Concern    Not on file     Social History Narrative    ** Merged History Encounter **          Allergies:  Macrobid [nitrofurantoin monohydrate macrocrystals] and Pcn [penicillins]    Review of Systems   Constitutional: Negative for chills, diaphoresis and fever. Respiratory: Negative for cough, shortness of breath and wheezing. Cardiovascular: Negative for chest pain, palpitations and leg swelling. Gastrointestinal: Negative for abdominal pain, diarrhea, nausea and vomiting. Genitourinary: Positive for dysuria, frequency, hesitancy and urgency. Negative for flank pain and hematuria. Objective:   /70 (Site: Left Arm, Position: Sitting, Cuff Size: Large Adult)   Pulse 100   Temp 96.8 °F (36 °C) (Temporal)   Resp 16   Ht 5' 5\" (1.651 m)   Wt 136 lb (61.7 kg)   SpO2 98%   BMI 22.63 kg/m²     Physical Exam   Constitutional: She is oriented to person, place, and time. She appears well-developed and well-nourished. No distress. HENT:   Head: Normocephalic and atraumatic. Eyes: Conjunctivae are normal.   Cardiovascular: Normal rate, regular rhythm and normal heart sounds. Pulmonary/Chest: Effort normal and breath sounds normal. No respiratory distress. She has no wheezes. Abdominal: Soft. Bowel sounds are normal. There is no tenderness. Musculoskeletal: She exhibits no tenderness. Neurological: She is alert and oriented to person, place, and time. Skin: Skin is warm and dry. No rash noted. She is not diaphoretic. Assessment & Plan:      Diagnosis Orders   1. Acute cystitis without hematuria  ciprofloxacin (CIPRO) 500 MG tablet    Urine Culture   2. Dysuria  Urine Culture    POCT Urinalysis no Micro     Orders Placed This Encounter   Procedures    Urine Culture     Standing Status:   Future     Standing Expiration Date:   8/19/2019     Order Specific Question:   Specify (ex-cath, midstream, cysto, etc)? Answer:   midstream    POCT Urinalysis no Micro     Orders Placed This Encounter   Medications    ciprofloxacin (CIPRO) 500 MG tablet     Sig: Take 1 tablet by mouth 2 times daily for 7 days     Dispense:  14 tablet     Refill:  0     There are no discontinued medications.   No Follow-up on file. Reviewed with the patient: current clinical status, medications, activities and diet. Side effects, adverse effects of the medication prescribed today, as well as treatment plan/ rationale and result expectations have been discussed with the patient who expresses understanding and desires to proceed. Pt instructions reviewed and given to patient.     Close follow up to evaluate treatment results and for coordination of care. I have reviewed the patient's medical history in detail and updated the computerized patient record.     Aleah Gamboa, LINSEY - CNP

## 2018-08-21 LAB — URINE CULTURE, ROUTINE: NORMAL

## 2018-09-06 DIAGNOSIS — G89.29 CHRONIC PELVIC PAIN IN FEMALE: ICD-10-CM

## 2018-09-06 DIAGNOSIS — R10.2 CHRONIC PELVIC PAIN IN FEMALE: ICD-10-CM

## 2018-09-06 RX ORDER — TRAMADOL HYDROCHLORIDE 50 MG/1
100 TABLET ORAL EVERY 8 HOURS PRN
Qty: 120 TABLET | Refills: 0 | Status: SHIPPED | OUTPATIENT
Start: 2018-09-06 | End: 2018-10-04 | Stop reason: SDUPTHER

## 2018-09-24 DIAGNOSIS — G89.29 CHRONIC PELVIC PAIN IN FEMALE: ICD-10-CM

## 2018-09-24 DIAGNOSIS — R10.2 CHRONIC PELVIC PAIN IN FEMALE: ICD-10-CM

## 2018-09-24 RX ORDER — PREGABALIN 75 MG/1
75 CAPSULE ORAL 3 TIMES DAILY
Qty: 60 CAPSULE | Refills: 0 | OUTPATIENT
Start: 2018-09-24 | End: 2018-10-24

## 2018-09-25 RX ORDER — PREGABALIN 75 MG/1
75 CAPSULE ORAL 3 TIMES DAILY
Qty: 60 CAPSULE | Refills: 0 | Status: SHIPPED | OUTPATIENT
Start: 2018-09-25 | End: 2018-10-04 | Stop reason: SDUPTHER

## 2018-09-27 ENCOUNTER — OFFICE VISIT (OUTPATIENT)
Dept: OBGYN CLINIC | Age: 63
End: 2018-09-27
Payer: MEDICARE

## 2018-09-27 VITALS
SYSTOLIC BLOOD PRESSURE: 108 MMHG | DIASTOLIC BLOOD PRESSURE: 82 MMHG | BODY MASS INDEX: 23.16 KG/M2 | HEIGHT: 65 IN | HEART RATE: 100 BPM | WEIGHT: 139 LBS

## 2018-09-27 DIAGNOSIS — R30.0 DYSURIA: Primary | ICD-10-CM

## 2018-09-27 LAB
BILIRUBIN, POC: NORMAL
BLOOD URINE, POC: NORMAL
CLARITY, POC: CLEAR
COLOR, POC: YELLOW
GLUCOSE URINE, POC: NORMAL
KETONES, POC: NORMAL
LEUKOCYTE EST, POC: NORMAL
NITRITE, POC: NORMAL
PH, POC: 5.5
PROTEIN, POC: NORMAL
SPECIFIC GRAVITY, POC: 1.01
UROBILINOGEN, POC: NORMAL

## 2018-09-27 PROCEDURE — 99203 OFFICE O/P NEW LOW 30 MIN: CPT | Performed by: OBSTETRICS & GYNECOLOGY

## 2018-09-27 PROCEDURE — 81003 URINALYSIS AUTO W/O SCOPE: CPT | Performed by: OBSTETRICS & GYNECOLOGY

## 2018-09-27 RX ORDER — PHENAZOPYRIDINE HYDROCHLORIDE 200 MG/1
200 TABLET, FILM COATED ORAL 3 TIMES DAILY PRN
Qty: 12 TABLET | Refills: 2 | Status: SHIPPED | OUTPATIENT
Start: 2018-09-27 | End: 2018-10-01

## 2018-10-01 ENCOUNTER — TELEPHONE (OUTPATIENT)
Dept: FAMILY MEDICINE CLINIC | Age: 63
End: 2018-10-01

## 2018-10-01 ENCOUNTER — OFFICE VISIT (OUTPATIENT)
Dept: SURGERY | Age: 63
End: 2018-10-01
Payer: MEDICARE

## 2018-10-01 ENCOUNTER — HOSPITAL ENCOUNTER (OUTPATIENT)
Age: 63
Setting detail: SPECIMEN
Discharge: HOME | End: 2018-10-01
Payer: MEDICARE

## 2018-10-01 VITALS
TEMPERATURE: 98.9 F | HEIGHT: 65 IN | BODY MASS INDEX: 22.99 KG/M2 | WEIGHT: 138 LBS | SYSTOLIC BLOOD PRESSURE: 124 MMHG | DIASTOLIC BLOOD PRESSURE: 82 MMHG

## 2018-10-01 DIAGNOSIS — G89.29 CHRONIC PELVIC PAIN IN FEMALE: ICD-10-CM

## 2018-10-01 DIAGNOSIS — D22.5 ATYPICAL NEVUS OF BACK: Primary | ICD-10-CM

## 2018-10-01 DIAGNOSIS — R10.2 CHRONIC PELVIC PAIN IN FEMALE: ICD-10-CM

## 2018-10-01 DIAGNOSIS — D48.9 NEOPLASM OF UNCERTAIN BEHAVIOR: ICD-10-CM

## 2018-10-01 PROCEDURE — 88305 TISSUE EXAM BY PATHOLOGIST: CPT

## 2018-10-01 PROCEDURE — 11400 EXC TR-EXT B9+MARG 0.5 CM<: CPT | Performed by: SURGERY

## 2018-10-01 PROCEDURE — 99999 PR OFFICE/OUTPT VISIT,PROCEDURE ONLY: CPT | Performed by: SURGERY

## 2018-10-01 PROCEDURE — 11100 PR BIOPSY OF SKIN LESION: CPT | Performed by: SURGERY

## 2018-10-01 RX ORDER — TRAMADOL HYDROCHLORIDE 50 MG/1
100 TABLET ORAL EVERY 8 HOURS PRN
Qty: 120 TABLET | Refills: 0 | OUTPATIENT
Start: 2018-10-01 | End: 2019-01-01

## 2018-10-01 ASSESSMENT — ENCOUNTER SYMPTOMS
APNEA: 0
SHORTNESS OF BREATH: 0
ABDOMINAL PAIN: 0

## 2018-10-04 ENCOUNTER — OFFICE VISIT (OUTPATIENT)
Dept: FAMILY MEDICINE CLINIC | Age: 63
End: 2018-10-04
Payer: MEDICARE

## 2018-10-04 VITALS
HEART RATE: 84 BPM | DIASTOLIC BLOOD PRESSURE: 70 MMHG | WEIGHT: 137 LBS | BODY MASS INDEX: 22.82 KG/M2 | HEIGHT: 65 IN | TEMPERATURE: 97.9 F | OXYGEN SATURATION: 99 % | SYSTOLIC BLOOD PRESSURE: 110 MMHG | RESPIRATION RATE: 18 BRPM

## 2018-10-04 DIAGNOSIS — F17.200 CURRENT SMOKER: ICD-10-CM

## 2018-10-04 DIAGNOSIS — M54.41 CHRONIC RIGHT-SIDED LOW BACK PAIN WITH RIGHT-SIDED SCIATICA: Chronic | ICD-10-CM

## 2018-10-04 DIAGNOSIS — R39.82 CHRONIC BLADDER PAIN: ICD-10-CM

## 2018-10-04 DIAGNOSIS — G89.29 CHRONIC RIGHT-SIDED LOW BACK PAIN WITH RIGHT-SIDED SCIATICA: Chronic | ICD-10-CM

## 2018-10-04 DIAGNOSIS — R10.2 CHRONIC PELVIC PAIN IN FEMALE: Primary | ICD-10-CM

## 2018-10-04 DIAGNOSIS — G89.29 CHRONIC PELVIC PAIN IN FEMALE: Primary | ICD-10-CM

## 2018-10-04 PROCEDURE — 99214 OFFICE O/P EST MOD 30 MIN: CPT | Performed by: NURSE PRACTITIONER

## 2018-10-04 RX ORDER — PREGABALIN 75 MG/1
75 CAPSULE ORAL 3 TIMES DAILY
Qty: 90 CAPSULE | Refills: 2 | Status: SHIPPED | OUTPATIENT
Start: 2018-10-04 | End: 2019-02-15 | Stop reason: SDUPTHER

## 2018-10-04 RX ORDER — TRAMADOL HYDROCHLORIDE 50 MG/1
100 TABLET ORAL EVERY 8 HOURS PRN
Qty: 120 TABLET | Refills: 0 | Status: SHIPPED | OUTPATIENT
Start: 2018-10-04 | End: 2018-11-05 | Stop reason: SDUPTHER

## 2018-10-04 NOTE — PROGRESS NOTES
Chief Complaint   Patient presents with    Back Pain       HPI: Jose Crooks is a 58 y.o. female presenting for follow-up of chronic back pain. Chronic back pain/sciatica: now following with Dr. Jeb aNthan in [address]. States that symptoms have been improving and she has been able to walk a little more during the day. Pain continues in the lower back into mostly the right buttock area and down into the leg the symptoms have not been as severe. She has not had any side effects with Ultram or Lyrica. Will need medication refills today. Chronic bladder pain: recently saw Dr. Adolfo Ray and was started on routine pyridium. She has not noticed any change in chronic symptoms. She continues to feel that bladder mesh from previous surgery is leading to chronic pelvic pain. She is not sure what to do. Has seen multiple urologist in the past and GYN. Current smoker: She states that she is not ready to quit at this time. Breathing has been good for her overall. She has not had shortness of breath or chronic cough. No mucous production. ROS: This patient reports no chest pain or pressure. There is no shortness of breath or cough. The patient reports no nausea or vomiting. There is no heartburn or indigestion. There is no diarrhea or constipation. Continues to take amitiza with relief. No black, bloody, mucusy or tarry stool noticed. The patient reports no bloating and no change in appetite. There is no numbness, tingling or swelling in the extremities. I have reviewed the following diagnostic data: recent visit note with Dr. Adolfo Ray and recent urine culture. EXAM:  Constitutional Blood pressure 110/70, pulse 84, temperature 97.9 °F (36.6 °C), temperature source Temporal, resp. rate 18, height 5' 5\" (1.651 m), weight 137 lb (62.1 kg), SpO2 99 %, not currently breastfeeding. .  She has a normal affect, no acute distress, appears well developed and well nourished.   Neck:  neck-

## 2018-10-15 ENCOUNTER — OFFICE VISIT (OUTPATIENT)
Dept: SURGERY | Age: 63
End: 2018-10-15

## 2018-10-15 VITALS
SYSTOLIC BLOOD PRESSURE: 120 MMHG | BODY MASS INDEX: 23.16 KG/M2 | DIASTOLIC BLOOD PRESSURE: 36 MMHG | TEMPERATURE: 98.6 F | HEIGHT: 65 IN | WEIGHT: 139 LBS

## 2018-10-15 DIAGNOSIS — Z09 SURGERY FOLLOW-UP: Primary | ICD-10-CM

## 2018-10-15 PROCEDURE — 99024 POSTOP FOLLOW-UP VISIT: CPT | Performed by: SURGERY

## 2018-10-29 DIAGNOSIS — R10.2 CHRONIC PELVIC PAIN IN FEMALE: ICD-10-CM

## 2018-10-29 DIAGNOSIS — G89.29 CHRONIC RIGHT-SIDED LOW BACK PAIN WITH RIGHT-SIDED SCIATICA: Chronic | ICD-10-CM

## 2018-10-29 DIAGNOSIS — G89.29 CHRONIC PELVIC PAIN IN FEMALE: ICD-10-CM

## 2018-10-29 DIAGNOSIS — M54.41 CHRONIC RIGHT-SIDED LOW BACK PAIN WITH RIGHT-SIDED SCIATICA: Chronic | ICD-10-CM

## 2018-10-29 RX ORDER — TRAMADOL HYDROCHLORIDE 50 MG/1
100 TABLET ORAL EVERY 8 HOURS PRN
Qty: 120 TABLET | Refills: 0 | OUTPATIENT
Start: 2018-10-29 | End: 2019-01-29

## 2018-10-31 ENCOUNTER — HOSPITAL ENCOUNTER (OUTPATIENT)
Age: 63
Setting detail: SPECIMEN
Discharge: HOME | End: 2018-10-31
Payer: MEDICARE

## 2018-10-31 ENCOUNTER — OFFICE VISIT (OUTPATIENT)
Dept: FAMILY MEDICINE CLINIC | Age: 63
End: 2018-10-31
Payer: MEDICARE

## 2018-10-31 VITALS
OXYGEN SATURATION: 98 % | TEMPERATURE: 97.1 F | RESPIRATION RATE: 18 BRPM | WEIGHT: 139 LBS | BODY MASS INDEX: 23.16 KG/M2 | SYSTOLIC BLOOD PRESSURE: 108 MMHG | HEIGHT: 65 IN | DIASTOLIC BLOOD PRESSURE: 70 MMHG | HEART RATE: 108 BPM

## 2018-10-31 DIAGNOSIS — R35.0 URINARY FREQUENCY: ICD-10-CM

## 2018-10-31 DIAGNOSIS — N39.0 URINARY TRACT INFECTION WITHOUT HEMATURIA, SITE UNSPECIFIED: Primary | ICD-10-CM

## 2018-10-31 DIAGNOSIS — N39.0 URINARY TRACT INFECTION WITHOUT HEMATURIA, SITE UNSPECIFIED: ICD-10-CM

## 2018-10-31 LAB
BILIRUBIN, POC: NORMAL
BLOOD URINE, POC: NORMAL
CLARITY, POC: CLEAR
COLOR, POC: YELLOW
GLUCOSE URINE, POC: NORMAL
KETONES, POC: NORMAL
LEUKOCYTE EST, POC: NORMAL
NITRITE, POC: NORMAL
PH, POC: 5
PROTEIN, POC: NORMAL
SPECIFIC GRAVITY, POC: 1.01
UROBILINOGEN, POC: NORMAL

## 2018-10-31 PROCEDURE — 87086 URINE CULTURE/COLONY COUNT: CPT

## 2018-10-31 PROCEDURE — 81003 URINALYSIS AUTO W/O SCOPE: CPT | Performed by: NURSE PRACTITIONER

## 2018-10-31 PROCEDURE — 99213 OFFICE O/P EST LOW 20 MIN: CPT | Performed by: NURSE PRACTITIONER

## 2018-10-31 RX ORDER — CIPROFLOXACIN 500 MG/1
500 TABLET, FILM COATED ORAL 2 TIMES DAILY
Qty: 14 TABLET | Refills: 0 | Status: SHIPPED | OUTPATIENT
Start: 2018-10-31 | End: 2018-11-07

## 2018-10-31 ASSESSMENT — ENCOUNTER SYMPTOMS
ABDOMINAL PAIN: 0
NAUSEA: 0
VOMITING: 0

## 2018-10-31 NOTE — TELEPHONE ENCOUNTER
Called and spoke with patient. She was made aware that medication was refused. She was advised that Dr. Laverne Knapp placed a referral to pain management to help with pain control. Instructed patient to take Tylenol Arthritis 650 mg TID until she can get in to see pain management. Patient voiced verbal understanding. Patient also asked about bringing in a urine sample to be checked for infection. Made patient aware that we do have the walk in clinic that she can be seen and evaluated for symptoms that she is having. Patient asked if her  can bring in her urine and then come into the office later on for an appointment. Made patient aware that she should just come in leave her urine and be seen all at once instead on making several trips. Patient voiced verbal understanding. She does not have any further questions or concerns at this time.

## 2018-10-31 NOTE — PATIENT INSTRUCTIONS
Antibiotic Instructions:   Complete the full course of antibiotics as ordered. To prevent antibiotic resistances please take medication as ordered and for the full duration even if you start to feel better. Take each dose with a small snack or meal to lessen potential GI upset. Consider intake of yogurt or probiotic during antibiotic use and for a few days after to help reduce the risk of developing a secondary infection. Separate the yogurt and antibiotic by at least 2 hours. Avoid alcohol while taking antibiotics.

## 2018-11-02 LAB — URINE CULTURE, ROUTINE: NORMAL

## 2018-11-03 ENCOUNTER — TELEPHONE (OUTPATIENT)
Dept: INTERNAL MEDICINE | Age: 63
End: 2018-11-03

## 2018-11-05 DIAGNOSIS — G89.29 CHRONIC PELVIC PAIN IN FEMALE: ICD-10-CM

## 2018-11-05 DIAGNOSIS — M54.41 CHRONIC RIGHT-SIDED LOW BACK PAIN WITH RIGHT-SIDED SCIATICA: Chronic | ICD-10-CM

## 2018-11-05 DIAGNOSIS — G89.29 CHRONIC RIGHT-SIDED LOW BACK PAIN WITH RIGHT-SIDED SCIATICA: Chronic | ICD-10-CM

## 2018-11-05 DIAGNOSIS — R10.2 CHRONIC PELVIC PAIN IN FEMALE: ICD-10-CM

## 2018-11-05 RX ORDER — TRAMADOL HYDROCHLORIDE 50 MG/1
100 TABLET ORAL EVERY 8 HOURS PRN
Qty: 120 TABLET | Refills: 0 | Status: SHIPPED | OUTPATIENT
Start: 2018-11-05 | End: 2018-11-30 | Stop reason: SDUPTHER

## 2018-11-10 ENCOUNTER — OFFICE VISIT (OUTPATIENT)
Dept: FAMILY MEDICINE CLINIC | Age: 63
End: 2018-11-10
Payer: MEDICARE

## 2018-11-10 ENCOUNTER — HOSPITAL ENCOUNTER (OUTPATIENT)
Age: 63
Setting detail: SPECIMEN
Discharge: HOME | End: 2018-11-10
Payer: MEDICARE

## 2018-11-10 VITALS
WEIGHT: 138 LBS | SYSTOLIC BLOOD PRESSURE: 120 MMHG | OXYGEN SATURATION: 97 % | HEART RATE: 80 BPM | RESPIRATION RATE: 20 BRPM | BODY MASS INDEX: 22.99 KG/M2 | TEMPERATURE: 97 F | HEIGHT: 65 IN | DIASTOLIC BLOOD PRESSURE: 78 MMHG

## 2018-11-10 DIAGNOSIS — N30.00 ACUTE CYSTITIS WITHOUT HEMATURIA: Primary | ICD-10-CM

## 2018-11-10 LAB
BILIRUBIN, POC: NORMAL
BLOOD URINE, POC: NORMAL
CLARITY, POC: NORMAL
COLOR, POC: NORMAL
GLUCOSE URINE, POC: NORMAL
KETONES, POC: NORMAL
LEUKOCYTE EST, POC: NORMAL
NITRITE, POC: NORMAL
PH, POC: 5
PROTEIN, POC: NORMAL
SPECIFIC GRAVITY, POC: 1
UROBILINOGEN, POC: NORMAL

## 2018-11-10 PROCEDURE — 99213 OFFICE O/P EST LOW 20 MIN: CPT | Performed by: NURSE PRACTITIONER

## 2018-11-10 PROCEDURE — 87086 URINE CULTURE/COLONY COUNT: CPT

## 2018-11-10 PROCEDURE — 81002 URINALYSIS NONAUTO W/O SCOPE: CPT | Performed by: NURSE PRACTITIONER

## 2018-11-10 RX ORDER — SULFAMETHOXAZOLE AND TRIMETHOPRIM 800; 160 MG/1; MG/1
1 TABLET ORAL 2 TIMES DAILY
Qty: 14 TABLET | Refills: 0 | Status: SHIPPED | OUTPATIENT
Start: 2018-11-10 | End: 2018-11-17

## 2018-11-12 LAB — URINE CULTURE, ROUTINE: NORMAL

## 2018-11-18 ASSESSMENT — ENCOUNTER SYMPTOMS
VOMITING: 0
NAUSEA: 0
BACK PAIN: 0
RESPIRATORY NEGATIVE: 1

## 2018-11-30 DIAGNOSIS — G89.29 CHRONIC PELVIC PAIN IN FEMALE: ICD-10-CM

## 2018-11-30 DIAGNOSIS — M54.41 CHRONIC RIGHT-SIDED LOW BACK PAIN WITH RIGHT-SIDED SCIATICA: Chronic | ICD-10-CM

## 2018-11-30 DIAGNOSIS — G89.29 CHRONIC RIGHT-SIDED LOW BACK PAIN WITH RIGHT-SIDED SCIATICA: Chronic | ICD-10-CM

## 2018-11-30 DIAGNOSIS — R10.9 BILATERAL FLANK PAIN: Primary | ICD-10-CM

## 2018-11-30 DIAGNOSIS — R10.2 CHRONIC PELVIC PAIN IN FEMALE: ICD-10-CM

## 2018-11-30 RX ORDER — TRAMADOL HYDROCHLORIDE 50 MG/1
100 TABLET ORAL EVERY 8 HOURS PRN
Qty: 120 TABLET | Refills: 0 | Status: SHIPPED | OUTPATIENT
Start: 2018-11-30 | End: 2018-12-21 | Stop reason: SDUPTHER

## 2018-11-30 NOTE — TELEPHONE ENCOUNTER
Patient is requesting medication refill. Please approve or deny this request.    Rx requested:  Requested Prescriptions     Pending Prescriptions Disp Refills    traMADol (ULTRAM) 50 MG tablet 120 tablet 0     Sig: Take 2 tablets by mouth every 8 hours as needed for Pain for up to 92 days. TAKE 2 TABLETS BY MOUTH EVERY 8 HOURS AS NEEDED FOR PAIN.        Last Office Visit:   10/4/2018      Next Visit Date:  Future Appointments  Date Time Provider Italo Garcia   1/4/2019 9:30 AM Terri Encinas, [address]

## 2018-12-19 ENCOUNTER — HOSPITAL ENCOUNTER (OUTPATIENT)
Age: 63
Setting detail: SPECIMEN
Discharge: HOME | End: 2018-12-19
Payer: MEDICARE

## 2018-12-19 ENCOUNTER — OFFICE VISIT (OUTPATIENT)
Dept: FAMILY MEDICINE CLINIC | Age: 63
End: 2018-12-19
Payer: MEDICARE

## 2018-12-19 VITALS
SYSTOLIC BLOOD PRESSURE: 140 MMHG | WEIGHT: 140 LBS | RESPIRATION RATE: 20 BRPM | TEMPERATURE: 98.8 F | HEART RATE: 98 BPM | HEIGHT: 65 IN | DIASTOLIC BLOOD PRESSURE: 78 MMHG | BODY MASS INDEX: 23.32 KG/M2

## 2018-12-19 DIAGNOSIS — R30.0 DYSURIA: ICD-10-CM

## 2018-12-19 DIAGNOSIS — N30.00 ACUTE CYSTITIS WITHOUT HEMATURIA: Primary | ICD-10-CM

## 2018-12-19 LAB
BACTERIA: NORMAL /HPF
BILIRUBIN URINE: NEGATIVE
BILIRUBIN, POC: ABNORMAL
BLOOD URINE, POC: ABNORMAL
BLOOD, URINE: NEGATIVE
CLARITY, POC: ABNORMAL
CLARITY: CLEAR
COLOR, POC: ABNORMAL
COLOR: ABNORMAL
EPITHELIAL CELLS, UA: NORMAL /HPF
GLUCOSE URINE, POC: ABNORMAL
GLUCOSE URINE: NEGATIVE MG/DL
KETONES, POC: ABNORMAL
KETONES, URINE: NEGATIVE MG/DL
LEUKOCYTE EST, POC: ABNORMAL
LEUKOCYTE ESTERASE, URINE: ABNORMAL
NITRITE, POC: ABNORMAL
NITRITE, URINE: POSITIVE
PH UA: 5 (ref 5–9)
PH, POC: 5.5
PROTEIN UA: NEGATIVE MG/DL
PROTEIN, POC: ABNORMAL
SPECIFIC GRAVITY UA: 1 (ref 1–1.03)
SPECIFIC GRAVITY, POC: 1
UROBILINOGEN, POC: ABNORMAL
UROBILINOGEN, URINE: 1 E.U./DL

## 2018-12-19 PROCEDURE — 87086 URINE CULTURE/COLONY COUNT: CPT

## 2018-12-19 PROCEDURE — 99213 OFFICE O/P EST LOW 20 MIN: CPT | Performed by: NURSE PRACTITIONER

## 2018-12-19 PROCEDURE — 81003 URINALYSIS AUTO W/O SCOPE: CPT | Performed by: NURSE PRACTITIONER

## 2018-12-19 PROCEDURE — 81001 URINALYSIS AUTO W/SCOPE: CPT

## 2018-12-19 PROCEDURE — 87186 SC STD MICRODIL/AGAR DIL: CPT

## 2018-12-19 PROCEDURE — 87077 CULTURE AEROBIC IDENTIFY: CPT

## 2018-12-19 RX ORDER — CIPROFLOXACIN 500 MG/1
500 TABLET, FILM COATED ORAL 2 TIMES DAILY
Qty: 14 TABLET | Refills: 0 | Status: SHIPPED | OUTPATIENT
Start: 2018-12-19 | End: 2018-12-26 | Stop reason: ALTCHOICE

## 2018-12-19 ASSESSMENT — ENCOUNTER SYMPTOMS
VOMITING: 0
NAUSEA: 0
RESPIRATORY NEGATIVE: 1
BACK PAIN: 0

## 2018-12-19 NOTE — PROGRESS NOTES
108 (90 BASE) MCG/ACT inhaler Inhale 2 puffs into the lungs every 6 hours as needed for Wheezing or Shortness of Breath 1 Inhaler 3    vitamin D (CHOLECALCIFEROL) 1000 UNIT TABS tablet Take 1,000 Units by mouth daily.  calcium-vitamin D (OSCAL 500/200 D-3) 500-200 MG-UNIT per tablet Take 1 tablet by mouth 2 times daily.  therapeutic multivitamin-minerals (THERAGRAN-M) tablet Take 1 tablet by mouth daily. No current facility-administered medications on file prior to visit. Past Surgical History:   Procedure Laterality Date    COLONOSCOPY      CYSTOSCOPY N/A 2014    x2    FOREIGN BODY REMOVAL Right 04/21/15    PARTIAL, GROIN    HERNIA REPAIR      double hernia oct.  2 2013    HYSTERECTOMY  7/14/11    bso Vishal Median    KIDNEY STONE SURGERY      x2    OTHER SURGICAL HISTORY      laser surgery facundo dx    [place] COLON CA SCRN NOT HI [facility] IND N/A 4/19/2017    COLONOSCOPY performed by Yue Raman MD at [address] N/A 10/19/2016    ANAL PROCTO SIGMOIDOSCOPY RIGID / excision perineal nodule performed by Yosef Abreu MD at [address] [place] [address]       Family History   Problem Relation Age of Onset    Heart Disease Mother     Other Mother         dementia    Arthritis Father      Social History     Social History    Marital status:      Spouse name: N/A    Number of children: N/A    Years of education: N/A     Occupational History    cook      Social History Main Topics    Smoking status: Current Every Day Smoker     Packs/day: 0.50     Years: 20.00     Types: Cigarettes    Smokeless tobacco: Never Used      Comment: relaxes me    Alcohol use 3.0 oz/week     5 Cans of beer per week      Comment: social    Drug use: No    Sexual activity: Yes     Partners: Male     Other Topics Concern    Not on file     Social History Narrative    ** Merged History Encounter **          Allergies:  Macrobid [nitrofurantoin monohydrate

## 2018-12-21 DIAGNOSIS — R10.2 CHRONIC PELVIC PAIN IN FEMALE: ICD-10-CM

## 2018-12-21 DIAGNOSIS — M54.41 CHRONIC RIGHT-SIDED LOW BACK PAIN WITH RIGHT-SIDED SCIATICA: Chronic | ICD-10-CM

## 2018-12-21 DIAGNOSIS — G89.29 CHRONIC PELVIC PAIN IN FEMALE: ICD-10-CM

## 2018-12-21 DIAGNOSIS — G89.29 CHRONIC RIGHT-SIDED LOW BACK PAIN WITH RIGHT-SIDED SCIATICA: Chronic | ICD-10-CM

## 2018-12-21 RX ORDER — TRAMADOL HYDROCHLORIDE 50 MG/1
100 TABLET ORAL EVERY 8 HOURS PRN
Qty: 120 TABLET | Refills: 2 | Status: SHIPPED | OUTPATIENT
Start: 2018-12-21 | End: 2019-01-04 | Stop reason: SDUPTHER

## 2018-12-21 NOTE — TELEPHONE ENCOUNTER
Patient is requesting medication refill. Please approve or deny this request.    Rx requested:  Requested Prescriptions     Pending Prescriptions Disp Refills    traMADol (ULTRAM) 50 MG tablet 120 tablet 0     Sig: Take 2 tablets by mouth every 8 hours as needed for Pain for up to 92 days. TAKE 2 TABLETS BY MOUTH EVERY 8 HOURS AS NEEDED FOR PAIN.        Last Office Visit:   10/4/2018    Last Labs:  12/19/18      Next Visit Date:  Future Appointments  Date Time Provider Italo Garcia   12/27/2018 1:00 PM BALDOMERO DOMINGUEZ ROOM 1 [facility]  CT [facility] Fac RAD   1/4/2019 9:30 AM Liliana Stahl, APRN - CNP [address]

## 2018-12-22 LAB
ORGANISM: ABNORMAL
URINE CULTURE, ROUTINE: ABNORMAL
URINE CULTURE, ROUTINE: ABNORMAL

## 2018-12-26 DIAGNOSIS — N30.01 ACUTE CYSTITIS WITH HEMATURIA: Primary | ICD-10-CM

## 2018-12-26 RX ORDER — LEVOFLOXACIN 500 MG/1
500 TABLET, FILM COATED ORAL DAILY
Qty: 7 TABLET | Refills: 0 | Status: SHIPPED | OUTPATIENT
Start: 2018-12-26 | End: 2019-01-02

## 2018-12-27 ENCOUNTER — HOSPITAL ENCOUNTER (OUTPATIENT)
Dept: CT IMAGING | Age: 63
Discharge: HOME | End: 2018-12-29
Payer: MEDICARE

## 2018-12-27 DIAGNOSIS — R10.9 BILATERAL FLANK PAIN: ICD-10-CM

## 2018-12-27 PROCEDURE — 74176 CT ABD & PELVIS W/O CONTRAST: CPT

## 2018-12-31 ENCOUNTER — TELEPHONE (OUTPATIENT)
Dept: FAMILY MEDICINE CLINIC | Age: 63
End: 2018-12-31

## 2019-01-04 ENCOUNTER — TELEPHONE (OUTPATIENT)
Dept: FAMILY MEDICINE CLINIC | Age: 64
End: 2019-01-04

## 2019-01-04 ENCOUNTER — OFFICE VISIT (OUTPATIENT)
Dept: FAMILY MEDICINE CLINIC | Age: 64
End: 2019-01-04
Payer: MEDICARE

## 2019-01-04 ENCOUNTER — HOSPITAL ENCOUNTER (OUTPATIENT)
Age: 64
Setting detail: SPECIMEN
Discharge: HOME | End: 2019-01-04
Payer: MEDICARE

## 2019-01-04 VITALS
WEIGHT: 143 LBS | SYSTOLIC BLOOD PRESSURE: 134 MMHG | DIASTOLIC BLOOD PRESSURE: 80 MMHG | RESPIRATION RATE: 12 BRPM | TEMPERATURE: 98.4 F | HEIGHT: 65 IN | HEART RATE: 90 BPM | BODY MASS INDEX: 23.82 KG/M2 | OXYGEN SATURATION: 98 %

## 2019-01-04 DIAGNOSIS — N20.0 KIDNEY STONE ON RIGHT SIDE: ICD-10-CM

## 2019-01-04 DIAGNOSIS — G89.29 CHRONIC RIGHT-SIDED LOW BACK PAIN WITH RIGHT-SIDED SCIATICA: Chronic | ICD-10-CM

## 2019-01-04 DIAGNOSIS — M54.41 CHRONIC RIGHT-SIDED LOW BACK PAIN WITH RIGHT-SIDED SCIATICA: Chronic | ICD-10-CM

## 2019-01-04 DIAGNOSIS — G89.29 CHRONIC PELVIC PAIN IN FEMALE: Primary | ICD-10-CM

## 2019-01-04 DIAGNOSIS — Z79.899 HIGH RISK MEDICATION USE: ICD-10-CM

## 2019-01-04 DIAGNOSIS — F17.200 CURRENT SMOKER: ICD-10-CM

## 2019-01-04 DIAGNOSIS — R10.2 CHRONIC PELVIC PAIN IN FEMALE: Primary | ICD-10-CM

## 2019-01-04 DIAGNOSIS — R30.0 DYSURIA: ICD-10-CM

## 2019-01-04 DIAGNOSIS — K59.03 DRUG-INDUCED CONSTIPATION: ICD-10-CM

## 2019-01-04 LAB
BILIRUBIN, POC: NORMAL
BLOOD URINE, POC: NORMAL
CLARITY, POC: CLEAR
COLOR, POC: YELLOW
GLUCOSE URINE, POC: NORMAL
KETONES, POC: NORMAL
LEUKOCYTE EST, POC: NORMAL
NITRITE, POC: NORMAL
PH, POC: 6
PROTEIN, POC: NORMAL
SPECIFIC GRAVITY, POC: 1.02
UROBILINOGEN, POC: 3.5

## 2019-01-04 PROCEDURE — 81003 URINALYSIS AUTO W/O SCOPE: CPT | Performed by: NURSE PRACTITIONER

## 2019-01-04 PROCEDURE — 80307 DRUG TEST PRSMV CHEM ANLYZR: CPT

## 2019-01-04 PROCEDURE — 99214 OFFICE O/P EST MOD 30 MIN: CPT | Performed by: NURSE PRACTITIONER

## 2019-01-04 RX ORDER — TAMSULOSIN HYDROCHLORIDE 0.4 MG/1
0.4 CAPSULE ORAL DAILY
Qty: 30 CAPSULE | Refills: 3 | Status: SHIPPED | OUTPATIENT
Start: 2019-01-04 | End: 2019-04-30 | Stop reason: ALTCHOICE

## 2019-01-04 RX ORDER — LUBIPROSTONE 24 UG/1
24 CAPSULE, GELATIN COATED ORAL DAILY
Qty: 30 CAPSULE | Refills: 2 | Status: SHIPPED | OUTPATIENT
Start: 2019-01-04 | End: 2019-07-30 | Stop reason: ALTCHOICE

## 2019-01-04 RX ORDER — TRAMADOL HYDROCHLORIDE 50 MG/1
100 TABLET ORAL EVERY 8 HOURS PRN
Qty: 180 TABLET | Refills: 2 | Status: SHIPPED | OUTPATIENT
Start: 2019-01-04 | End: 2019-01-18 | Stop reason: SDUPTHER

## 2019-01-04 ASSESSMENT — PATIENT HEALTH QUESTIONNAIRE - PHQ9
2. FEELING DOWN, DEPRESSED OR HOPELESS: 1
SUM OF ALL RESPONSES TO PHQ QUESTIONS 1-9: 1
SUM OF ALL RESPONSES TO PHQ9 QUESTIONS 1 & 2: 1
SUM OF ALL RESPONSES TO PHQ QUESTIONS 1-9: 1
1. LITTLE INTEREST OR PLEASURE IN DOING THINGS: 0

## 2019-01-05 ENCOUNTER — HOSPITAL ENCOUNTER (OUTPATIENT)
Dept: LAB | Age: 64
Discharge: HOME | End: 2019-01-05
Payer: MEDICARE

## 2019-01-05 DIAGNOSIS — R10.2 CHRONIC PELVIC PAIN IN FEMALE: ICD-10-CM

## 2019-01-05 DIAGNOSIS — G89.29 CHRONIC PELVIC PAIN IN FEMALE: ICD-10-CM

## 2019-01-05 DIAGNOSIS — R10.32 LLQ ABDOMINAL PAIN: ICD-10-CM

## 2019-01-05 DIAGNOSIS — Z13.6 SCREENING, ISCHEMIC HEART DISEASE: ICD-10-CM

## 2019-01-05 LAB
ALBUMIN SERPL-MCNC: 4.6 G/DL (ref 3.9–4.9)
ALP BLD-CCNC: 100 U/L (ref 40–130)
ALT SERPL-CCNC: 18 U/L (ref 0–33)
ANION GAP SERPL CALCULATED.3IONS-SCNC: 18 MEQ/L (ref 7–13)
AST SERPL-CCNC: 19 U/L (ref 0–35)
BASOPHILS ABSOLUTE: 0.1 K/UL (ref 0–0.2)
BASOPHILS RELATIVE PERCENT: 0.7 %
BILIRUB SERPL-MCNC: 0.4 MG/DL (ref 0–1.2)
BUN BLDV-MCNC: 13 MG/DL (ref 8–23)
CALCIUM SERPL-MCNC: 9.6 MG/DL (ref 8.6–10.2)
CHLORIDE BLD-SCNC: 102 MEQ/L (ref 98–107)
CHOLESTEROL, TOTAL: 199 MG/DL (ref 0–199)
CO2: 23 MEQ/L (ref 22–29)
CREAT SERPL-MCNC: 0.67 MG/DL (ref 0.5–0.9)
EOSINOPHILS ABSOLUTE: 0 K/UL (ref 0–0.7)
EOSINOPHILS RELATIVE PERCENT: 0.5 %
GFR AFRICAN AMERICAN: >60
GFR NON-AFRICAN AMERICAN: >60
GLOBULIN: 2.7 G/DL (ref 2.3–3.5)
GLUCOSE BLD-MCNC: 109 MG/DL (ref 74–109)
HCT VFR BLD CALC: 41.2 % (ref 37–47)
HDLC SERPL-MCNC: 76 MG/DL (ref 40–59)
HEMOGLOBIN: 13.8 G/DL (ref 12–16)
LDL CHOLESTEROL CALCULATED: 91 MG/DL (ref 0–129)
LYMPHOCYTES ABSOLUTE: 1.8 K/UL (ref 1–4.8)
LYMPHOCYTES RELATIVE PERCENT: 17.8 %
MCH RBC QN AUTO: 31.4 PG (ref 27–31.3)
MCHC RBC AUTO-ENTMCNC: 33.4 % (ref 33–37)
MCV RBC AUTO: 94 FL (ref 82–100)
MONOCYTES ABSOLUTE: 0.7 K/UL (ref 0.2–0.8)
MONOCYTES RELATIVE PERCENT: 7.5 %
NEUTROPHILS ABSOLUTE: 7.2 K/UL (ref 1.4–6.5)
NEUTROPHILS RELATIVE PERCENT: 73.5 %
PDW BLD-RTO: 14 % (ref 11.5–14.5)
PLATELET # BLD: 340 K/UL (ref 130–400)
POTASSIUM SERPL-SCNC: 4.4 MEQ/L (ref 3.5–5.1)
RBC # BLD: 4.38 M/UL (ref 4.2–5.4)
SODIUM BLD-SCNC: 143 MEQ/L (ref 132–144)
TOTAL PROTEIN: 7.3 G/DL (ref 6.4–8.1)
TRIGL SERPL-MCNC: 162 MG/DL (ref 0–200)
WBC # BLD: 9.8 K/UL (ref 4.8–10.8)

## 2019-01-05 PROCEDURE — 85025 COMPLETE CBC W/AUTO DIFF WBC: CPT

## 2019-01-05 PROCEDURE — 80061 LIPID PANEL: CPT

## 2019-01-05 PROCEDURE — 36415 COLL VENOUS BLD VENIPUNCTURE: CPT

## 2019-01-05 PROCEDURE — 80053 COMPREHEN METABOLIC PANEL: CPT

## 2019-01-07 LAB
6-ACETYLMORPHINE: NOT DETECTED
7-AMINOCLONAZEPAM: NOT DETECTED
ALPHA-OH-ALPRAZOLAM: NOT DETECTED
ALPRAZOLAM: NOT DETECTED
AMPHETAMINE: NOT DETECTED
BARBITURATES: NOT DETECTED
BENZOYLECGONINE: NOT DETECTED
BUPRENORPHINE: NOT DETECTED
CARISOPRODOL: NOT DETECTED
CLONAZEPAM: NOT DETECTED
CODEINE: NOT DETECTED
CREATININE URINE: 112.5 MG/DL (ref 20–400)
DIAZEPAM: NOT DETECTED
EER PAIN MGT DRUG PANEL, HIGH RES/EMIT U: NORMAL
ETHYL GLUCURONIDE: NOT DETECTED
FENTANYL: NOT DETECTED
HYDROCODONE: NOT DETECTED
HYDROMORPHONE: NOT DETECTED
LORAZEPAM: NOT DETECTED
MARIJUANA METABOLITE: NOT DETECTED
MDA: NOT DETECTED
MDEA: NOT DETECTED
MDMA URINE: NOT DETECTED
MEPERIDINE: NOT DETECTED
METHADONE: NOT DETECTED
METHAMPHETAMINE: NOT DETECTED
METHYLPHENIDATE: NOT DETECTED
MIDAZOLAM: NOT DETECTED
MORPHINE: NOT DETECTED
NORBUPRENORPHINE, FREE: NOT DETECTED
NORDIAZEPAM: NOT DETECTED
NORFENTANYL: NOT DETECTED
NORHYDROCODONE, URINE: NOT DETECTED
NOROXYCODONE: NOT DETECTED
NOROXYMORPHONE, URINE: NOT DETECTED
OXAZEPAM: NOT DETECTED
OXYCODONE: NOT DETECTED
OXYMORPHONE: NOT DETECTED
PAIN MANAGEMENT DRUG PANEL: NORMAL
PCP: NOT DETECTED
PHENTERMINE: NOT DETECTED
PROPOXYPHENE: NOT DETECTED
TAPENTADOL, URINE: NOT DETECTED
TAPENTADOL-O-SULFATE, URINE: NOT DETECTED
TEMAZEPAM: NOT DETECTED
TRAMADOL: PRESENT
ZOLPIDEM: NOT DETECTED

## 2019-01-18 DIAGNOSIS — G89.29 CHRONIC RIGHT-SIDED LOW BACK PAIN WITH RIGHT-SIDED SCIATICA: Chronic | ICD-10-CM

## 2019-01-18 DIAGNOSIS — R10.2 CHRONIC PELVIC PAIN IN FEMALE: ICD-10-CM

## 2019-01-18 DIAGNOSIS — G89.29 CHRONIC PELVIC PAIN IN FEMALE: ICD-10-CM

## 2019-01-18 DIAGNOSIS — M54.41 CHRONIC RIGHT-SIDED LOW BACK PAIN WITH RIGHT-SIDED SCIATICA: Chronic | ICD-10-CM

## 2019-01-18 RX ORDER — TRAMADOL HYDROCHLORIDE 50 MG/1
100 TABLET ORAL EVERY 8 HOURS PRN
Qty: 180 TABLET | Refills: 2 | Status: SHIPPED | OUTPATIENT
Start: 2019-01-18 | End: 2019-04-30 | Stop reason: SDUPTHER

## 2019-02-06 ENCOUNTER — OFFICE VISIT (OUTPATIENT)
Dept: FAMILY MEDICINE CLINIC | Age: 64
End: 2019-02-06
Payer: MEDICARE

## 2019-02-06 ENCOUNTER — HOSPITAL ENCOUNTER (OUTPATIENT)
Age: 64
Setting detail: SPECIMEN
Discharge: HOME | End: 2019-02-06
Payer: MEDICARE

## 2019-02-06 VITALS
WEIGHT: 143 LBS | HEART RATE: 89 BPM | DIASTOLIC BLOOD PRESSURE: 70 MMHG | TEMPERATURE: 97 F | SYSTOLIC BLOOD PRESSURE: 130 MMHG | OXYGEN SATURATION: 98 % | BODY MASS INDEX: 23.82 KG/M2 | HEIGHT: 65 IN | RESPIRATION RATE: 20 BRPM

## 2019-02-06 DIAGNOSIS — N30.00 ACUTE CYSTITIS WITHOUT HEMATURIA: ICD-10-CM

## 2019-02-06 DIAGNOSIS — N30.00 ACUTE CYSTITIS WITHOUT HEMATURIA: Primary | ICD-10-CM

## 2019-02-06 LAB
BILIRUBIN, POC: NORMAL
BLOOD URINE, POC: NORMAL
CLARITY, POC: NORMAL
COLOR, POC: NORMAL
GLUCOSE URINE, POC: NORMAL
KETONES, POC: NORMAL
LEUKOCYTE EST, POC: NORMAL
NITRITE, POC: NORMAL
PH, POC: 5
PROTEIN, POC: NORMAL
SPECIFIC GRAVITY, POC: 1.01
UROBILINOGEN, POC: NORMAL

## 2019-02-06 PROCEDURE — 87086 URINE CULTURE/COLONY COUNT: CPT

## 2019-02-06 PROCEDURE — 81002 URINALYSIS NONAUTO W/O SCOPE: CPT | Performed by: NURSE PRACTITIONER

## 2019-02-06 PROCEDURE — 99213 OFFICE O/P EST LOW 20 MIN: CPT | Performed by: NURSE PRACTITIONER

## 2019-02-06 RX ORDER — CIPROFLOXACIN 500 MG/1
500 TABLET, FILM COATED ORAL 2 TIMES DAILY
Qty: 14 TABLET | Refills: 0 | Status: SHIPPED | OUTPATIENT
Start: 2019-02-06 | End: 2019-04-30 | Stop reason: SDUPTHER

## 2019-02-08 LAB — URINE CULTURE, ROUTINE: NORMAL

## 2019-02-15 ENCOUNTER — TELEPHONE (OUTPATIENT)
Dept: FAMILY MEDICINE CLINIC | Age: 64
End: 2019-02-15

## 2019-02-15 DIAGNOSIS — G89.29 CHRONIC RIGHT-SIDED LOW BACK PAIN WITH RIGHT-SIDED SCIATICA: Chronic | ICD-10-CM

## 2019-02-15 DIAGNOSIS — G89.29 CHRONIC PELVIC PAIN IN FEMALE: ICD-10-CM

## 2019-02-15 DIAGNOSIS — M54.41 CHRONIC RIGHT-SIDED LOW BACK PAIN WITH RIGHT-SIDED SCIATICA: Chronic | ICD-10-CM

## 2019-02-15 DIAGNOSIS — R10.2 CHRONIC PELVIC PAIN IN FEMALE: ICD-10-CM

## 2019-02-15 RX ORDER — PREGABALIN 75 MG/1
75 CAPSULE ORAL 3 TIMES DAILY
Qty: 240 CAPSULE | Refills: 0 | Status: SHIPPED | OUTPATIENT
Start: 2019-02-15 | End: 2019-02-18 | Stop reason: SDUPTHER

## 2019-02-17 ASSESSMENT — ENCOUNTER SYMPTOMS
VOMITING: 0
RESPIRATORY NEGATIVE: 1
BACK PAIN: 0
NAUSEA: 0

## 2019-02-18 DIAGNOSIS — R10.2 CHRONIC PELVIC PAIN IN FEMALE: ICD-10-CM

## 2019-02-18 DIAGNOSIS — G89.29 CHRONIC RIGHT-SIDED LOW BACK PAIN WITH RIGHT-SIDED SCIATICA: Chronic | ICD-10-CM

## 2019-02-18 DIAGNOSIS — M54.41 CHRONIC RIGHT-SIDED LOW BACK PAIN WITH RIGHT-SIDED SCIATICA: Chronic | ICD-10-CM

## 2019-02-18 DIAGNOSIS — G89.29 CHRONIC PELVIC PAIN IN FEMALE: ICD-10-CM

## 2019-02-18 RX ORDER — PREGABALIN 75 MG/1
75 CAPSULE ORAL 3 TIMES DAILY
Qty: 270 CAPSULE | Refills: 0 | Status: SHIPPED | OUTPATIENT
Start: 2019-02-18 | End: 2019-07-26 | Stop reason: SDUPTHER

## 2019-02-21 RX ORDER — SULFAMETHOXAZOLE AND TRIMETHOPRIM 400; 80 MG/1; MG/1
TABLET ORAL
Qty: 30 TABLET | Refills: 3 | Status: SHIPPED | OUTPATIENT
Start: 2019-02-21 | End: 2019-04-30 | Stop reason: ALTCHOICE

## 2019-03-14 ENCOUNTER — HOSPITAL ENCOUNTER (OUTPATIENT)
Age: 64
Setting detail: SPECIMEN
Discharge: HOME | End: 2019-03-14
Payer: MEDICARE

## 2019-03-14 ENCOUNTER — OFFICE VISIT (OUTPATIENT)
Dept: FAMILY MEDICINE CLINIC | Age: 64
End: 2019-03-14
Payer: MEDICARE

## 2019-03-14 VITALS
HEART RATE: 78 BPM | HEIGHT: 65 IN | OXYGEN SATURATION: 97 % | SYSTOLIC BLOOD PRESSURE: 128 MMHG | RESPIRATION RATE: 18 BRPM | TEMPERATURE: 97 F | DIASTOLIC BLOOD PRESSURE: 72 MMHG | BODY MASS INDEX: 23.99 KG/M2 | WEIGHT: 144 LBS

## 2019-03-14 DIAGNOSIS — N30.01 ACUTE CYSTITIS WITH HEMATURIA: ICD-10-CM

## 2019-03-14 DIAGNOSIS — N30.01 ACUTE CYSTITIS WITH HEMATURIA: Primary | ICD-10-CM

## 2019-03-14 PROCEDURE — 87186 SC STD MICRODIL/AGAR DIL: CPT

## 2019-03-14 PROCEDURE — 87077 CULTURE AEROBIC IDENTIFY: CPT

## 2019-03-14 PROCEDURE — 81003 URINALYSIS AUTO W/O SCOPE: CPT | Performed by: NURSE PRACTITIONER

## 2019-03-14 PROCEDURE — 87086 URINE CULTURE/COLONY COUNT: CPT

## 2019-03-14 PROCEDURE — 99213 OFFICE O/P EST LOW 20 MIN: CPT | Performed by: NURSE PRACTITIONER

## 2019-03-14 RX ORDER — LEVOFLOXACIN 500 MG/1
500 TABLET, FILM COATED ORAL DAILY
Qty: 7 TABLET | Refills: 0 | Status: SHIPPED | OUTPATIENT
Start: 2019-03-14 | End: 2019-03-21

## 2019-03-14 ASSESSMENT — ENCOUNTER SYMPTOMS
SHORTNESS OF BREATH: 0
VOMITING: 0
DIARRHEA: 0
COUGH: 0
ABDOMINAL PAIN: 1
WHEEZING: 0
NAUSEA: 0

## 2019-03-17 LAB
ORGANISM: ABNORMAL
URINE CULTURE, ROUTINE: ABNORMAL
URINE CULTURE, ROUTINE: ABNORMAL

## 2019-03-26 ENCOUNTER — HOSPITAL ENCOUNTER (OUTPATIENT)
Age: 64
Setting detail: SPECIMEN
Discharge: HOME | End: 2019-03-26
Payer: MEDICARE

## 2019-03-26 ENCOUNTER — HOSPITAL ENCOUNTER (OUTPATIENT)
Dept: GENERAL RADIOLOGY | Age: 64
Discharge: HOME | End: 2019-03-28
Payer: MEDICARE

## 2019-03-26 DIAGNOSIS — N20.0 URIC ACID NEPHROLITHIASIS: ICD-10-CM

## 2019-03-26 LAB
BACTERIA: NORMAL /HPF
BILIRUBIN URINE: ABNORMAL
BLOOD, URINE: NEGATIVE
CLARITY: CLEAR
COLOR: YELLOW
EPITHELIAL CELLS, UA: NORMAL /HPF
GLUCOSE URINE: NEGATIVE MG/DL
KETONES, URINE: NEGATIVE MG/DL
LEUKOCYTE ESTERASE, URINE: ABNORMAL
MUCUS: PRESENT
NITRITE, URINE: NEGATIVE
PH UA: 6 (ref 5–9)
PROTEIN UA: ABNORMAL MG/DL
RBC UA: NORMAL /HPF (ref 0–2)
SPECIFIC GRAVITY UA: >=1.03 (ref 1–1.03)
UROBILINOGEN, URINE: 0.2 E.U./DL
WBC UA: NORMAL /HPF (ref 0–5)

## 2019-03-26 PROCEDURE — 87077 CULTURE AEROBIC IDENTIFY: CPT

## 2019-03-26 PROCEDURE — 87186 SC STD MICRODIL/AGAR DIL: CPT

## 2019-03-26 PROCEDURE — 81001 URINALYSIS AUTO W/SCOPE: CPT

## 2019-03-26 PROCEDURE — 74400 UROGRAPHY IV +-KUB TOMOG: CPT

## 2019-03-26 PROCEDURE — 88112 CYTOPATH CELL ENHANCE TECH: CPT

## 2019-03-26 PROCEDURE — 6360000004 HC RX CONTRAST MEDICATION: Performed by: UROLOGY

## 2019-03-26 PROCEDURE — 87086 URINE CULTURE/COLONY COUNT: CPT

## 2019-03-26 RX ADMIN — IOPAMIDOL 100 ML: 755 INJECTION, SOLUTION INTRAVENOUS at 09:02

## 2019-03-28 LAB
ORGANISM: ABNORMAL
URINE CULTURE, ROUTINE: ABNORMAL
URINE CULTURE, ROUTINE: ABNORMAL

## 2019-04-24 ENCOUNTER — HOSPITAL ENCOUNTER (OUTPATIENT)
Age: 64
Setting detail: SPECIMEN
Discharge: HOME | End: 2019-04-24
Payer: MEDICARE

## 2019-04-24 LAB
BACTERIA: NEGATIVE /HPF
BILIRUBIN URINE: NEGATIVE
BLOOD, URINE: NEGATIVE
CLARITY: ABNORMAL
COLOR: ABNORMAL
EPITHELIAL CELLS, UA: NORMAL /HPF (ref 0–5)
GLUCOSE URINE: NEGATIVE MG/DL
HYALINE CASTS: NORMAL /HPF (ref 0–5)
KETONES, URINE: NEGATIVE MG/DL
LEUKOCYTE ESTERASE, URINE: ABNORMAL
NITRITE, URINE: POSITIVE
PH UA: 6.5 (ref 5–9)
PROTEIN UA: NEGATIVE MG/DL
RBC UA: NORMAL /HPF (ref 0–2)
SPECIFIC GRAVITY UA: 1 (ref 1–1.03)
UROBILINOGEN, URINE: 1 E.U./DL
WBC UA: NORMAL /HPF (ref 0–5)

## 2019-04-24 PROCEDURE — 87086 URINE CULTURE/COLONY COUNT: CPT

## 2019-04-24 PROCEDURE — 88112 CYTOPATH CELL ENHANCE TECH: CPT

## 2019-04-24 PROCEDURE — 81001 URINALYSIS AUTO W/SCOPE: CPT

## 2019-04-26 LAB — URINE CULTURE, ROUTINE: NORMAL

## 2019-04-30 ENCOUNTER — HOSPITAL ENCOUNTER (OUTPATIENT)
Age: 64
Setting detail: SPECIMEN
Discharge: HOME | End: 2019-04-30
Payer: MEDICARE

## 2019-04-30 ENCOUNTER — OFFICE VISIT (OUTPATIENT)
Dept: FAMILY MEDICINE CLINIC | Age: 64
End: 2019-04-30
Payer: MEDICARE

## 2019-04-30 VITALS
WEIGHT: 146 LBS | SYSTOLIC BLOOD PRESSURE: 110 MMHG | DIASTOLIC BLOOD PRESSURE: 60 MMHG | HEART RATE: 90 BPM | OXYGEN SATURATION: 97 % | TEMPERATURE: 98.3 F | HEIGHT: 65 IN | BODY MASS INDEX: 24.32 KG/M2 | RESPIRATION RATE: 16 BRPM

## 2019-04-30 DIAGNOSIS — G89.29 CHRONIC PELVIC PAIN IN FEMALE: ICD-10-CM

## 2019-04-30 DIAGNOSIS — G89.29 CHRONIC RIGHT-SIDED LOW BACK PAIN WITH RIGHT-SIDED SCIATICA: Chronic | ICD-10-CM

## 2019-04-30 DIAGNOSIS — Z12.39 BREAST CANCER SCREENING: ICD-10-CM

## 2019-04-30 DIAGNOSIS — M54.41 CHRONIC RIGHT-SIDED LOW BACK PAIN WITH RIGHT-SIDED SCIATICA: Chronic | ICD-10-CM

## 2019-04-30 DIAGNOSIS — Z79.899 HIGH RISK MEDICATION USE: ICD-10-CM

## 2019-04-30 DIAGNOSIS — K59.09 CHRONIC CONSTIPATION: ICD-10-CM

## 2019-04-30 DIAGNOSIS — G89.29 CHRONIC PELVIC PAIN IN FEMALE: Primary | ICD-10-CM

## 2019-04-30 DIAGNOSIS — N30.00 ACUTE CYSTITIS WITHOUT HEMATURIA: ICD-10-CM

## 2019-04-30 DIAGNOSIS — R10.2 CHRONIC PELVIC PAIN IN FEMALE: ICD-10-CM

## 2019-04-30 DIAGNOSIS — R10.2 CHRONIC PELVIC PAIN IN FEMALE: Primary | ICD-10-CM

## 2019-04-30 LAB
BILIRUBIN, POC: ABNORMAL
BLOOD URINE, POC: ABNORMAL
CLARITY, POC: ABNORMAL
COLOR, POC: YELLOW
GLUCOSE URINE, POC: ABNORMAL
KETONES, POC: ABNORMAL
LEUKOCYTE EST, POC: 125
NITRITE, POC: ABNORMAL
PH, POC: 6
PROTEIN, POC: ABNORMAL
SPECIFIC GRAVITY, POC: 1.02
UROBILINOGEN, POC: 3.5

## 2019-04-30 PROCEDURE — 87086 URINE CULTURE/COLONY COUNT: CPT

## 2019-04-30 PROCEDURE — 99214 OFFICE O/P EST MOD 30 MIN: CPT | Performed by: NURSE PRACTITIONER

## 2019-04-30 PROCEDURE — 87186 SC STD MICRODIL/AGAR DIL: CPT

## 2019-04-30 PROCEDURE — 81003 URINALYSIS AUTO W/O SCOPE: CPT | Performed by: NURSE PRACTITIONER

## 2019-04-30 PROCEDURE — 87077 CULTURE AEROBIC IDENTIFY: CPT

## 2019-04-30 PROCEDURE — 80307 DRUG TEST PRSMV CHEM ANLYZR: CPT

## 2019-04-30 RX ORDER — MELOXICAM 15 MG/1
15 TABLET ORAL DAILY
Qty: 30 TABLET | Refills: 0 | Status: SHIPPED | OUTPATIENT
Start: 2019-04-30 | End: 2019-07-15 | Stop reason: SDUPTHER

## 2019-04-30 RX ORDER — ALBUTEROL SULFATE 90 UG/1
2 AEROSOL, METERED RESPIRATORY (INHALATION) EVERY 6 HOURS PRN
Qty: 1 INHALER | Refills: 3 | Status: SHIPPED | OUTPATIENT
Start: 2019-04-30

## 2019-04-30 RX ORDER — TRAMADOL HYDROCHLORIDE 50 MG/1
100 TABLET ORAL EVERY 8 HOURS PRN
Qty: 180 TABLET | Refills: 2 | Status: SHIPPED | OUTPATIENT
Start: 2019-04-30 | End: 2019-07-30 | Stop reason: SDUPTHER

## 2019-04-30 RX ORDER — CIPROFLOXACIN 500 MG/1
500 TABLET, FILM COATED ORAL 2 TIMES DAILY
Qty: 14 TABLET | Refills: 0 | Status: SHIPPED | OUTPATIENT
Start: 2019-04-30 | End: 2019-05-07

## 2019-04-30 ASSESSMENT — PATIENT HEALTH QUESTIONNAIRE - PHQ9
SUM OF ALL RESPONSES TO PHQ QUESTIONS 1-9: 2
SUM OF ALL RESPONSES TO PHQ QUESTIONS 1-9: 2
2. FEELING DOWN, DEPRESSED OR HOPELESS: 1
1. LITTLE INTEREST OR PLEASURE IN DOING THINGS: 1
SUM OF ALL RESPONSES TO PHQ9 QUESTIONS 1 & 2: 2

## 2019-04-30 NOTE — PROGRESS NOTES
temperature 98.3 °F (36.8 °C), temperature source Temporal, resp. rate 16, height 5' 5\" (1.651 m), weight 146 lb (66.2 kg), SpO2 97 %, not currently breastfeeding. .  She has a normal affect, no acute distress, appears well developed and well nourished. There is no costovertebral angle tenderness. Lumbar spine and sacroiliac joints are tender on the right sided. Straight leg raising is negative bilateral.  There is no edema in the ankles. Pulses palpable at both posterior tibial  Arteries. Lungs are clear with equal breath sounds. Chest wall is not tender. Heart is in a regular rhythm with normal rate and no murmurs, rubs, or gallops. The four extremities are without edema. Lungs are clear with equal breath sounds. Chest wall is not tender. Heart is in a regular rhythm with normal rate and no murmurs, rubs, or gallops. The four extremities are without edema. Urinalysis is positive for leukocytes only. Culture sent. DIAGNOSIS:    Diagnosis Orders   1. Chronic pelvic pain in female  traMADol (ULTRAM) 50 MG tablet    POCT Urinalysis No Micro (Auto)    Urine Culture   2. Acute cystitis without hematuria  ciprofloxacin (CIPRO) 500 MG tablet   3. Chronic constipation  psyllium (METAMUCIL SMOOTH TEXTURE) 28 % packet   4. Chronic right-sided low back pain with right-sided sciatica  traMADol (ULTRAM) 50 MG tablet    XR LUMBAR SPINE (MIN 4 VIEWS)    Amb External Referral To Pain Clinic   5. High risk medication use  Pain Management Drug Screen   6. Breast cancer screening  OSVALDO DIGITAL SCREEN W CAD BILATERAL       PLAN: Include orders in the DX section. Follow up: 3 months and as needed. 1. 5.  She continues to struggle with chronic pain of the pelvic region. States that she does get relief with Ultram.  No side effects reported with the medication. She has seen multiple specialists for this pelvic pain and has not been able to get any answers.     2.  Urinalysis in office does show some white blood cell in the urine. Culture sent. Antibiotic ordered for 7 days. She will be establishing care with new urologist next week. 3.  Recommend trying Metamucil twice a day to help with chronic constipation symptoms. Continue other medication for now. 6.  Mammogram order given. Controlled Substances Monitoring:     RX Monitoring 4/30/2019   Attestation The Prescription Monitoring Report for this patient was reviewed today. Acute Pain Prescriptions -   Chronic Pain Routine Monitoring Random urine drug screen sent today. ;No signs of potential drug abuse or diversion identified: otherwise, see note documentation   Chronic Pain > 80 MEDD Obtained or confirmed a written medication contract was on file. Please note this report has been partially produced using speech recognition software and may cause contain errors related to that system including grammar, punctuation and spelling as well as words and phrases that may seem inappropriate. If there are questions or concerns please feel free to contact me to clarify.         Electronically signed by Trey OspinaCNP, 10:59 AM 4/30/19

## 2019-05-03 LAB
6-ACETYLMORPHINE: NOT DETECTED
7-AMINOCLONAZEPAM: NOT DETECTED
ALPHA-OH-ALPRAZOLAM: NOT DETECTED
ALPRAZOLAM: NOT DETECTED
AMPHETAMINE: NOT DETECTED
BARBITURATES: NOT DETECTED
BENZOYLECGONINE: NOT DETECTED
BUPRENORPHINE: NOT DETECTED
CARISOPRODOL: NOT DETECTED
CLONAZEPAM: NOT DETECTED
CODEINE: NOT DETECTED
CREATININE URINE: 82.3 MG/DL (ref 20–400)
DIAZEPAM: NOT DETECTED
EER PAIN MGT DRUG PANEL, HIGH RES/EMIT U: NORMAL
ETHYL GLUCURONIDE: NOT DETECTED
FENTANYL: NOT DETECTED
HYDROCODONE: NOT DETECTED
HYDROMORPHONE: NOT DETECTED
LORAZEPAM: NOT DETECTED
MARIJUANA METABOLITE: NOT DETECTED
MDA: NOT DETECTED
MDEA: NOT DETECTED
MDMA URINE: NOT DETECTED
MEPERIDINE: NOT DETECTED
METHADONE: NOT DETECTED
METHAMPHETAMINE: NOT DETECTED
METHYLPHENIDATE: NOT DETECTED
MIDAZOLAM: NOT DETECTED
MORPHINE: NOT DETECTED
NORBUPRENORPHINE, FREE: NOT DETECTED
NORDIAZEPAM: NOT DETECTED
NORFENTANYL: NOT DETECTED
NORHYDROCODONE, URINE: NOT DETECTED
NOROXYCODONE: NOT DETECTED
NOROXYMORPHONE, URINE: NOT DETECTED
ORGANISM: ABNORMAL
OXAZEPAM: NOT DETECTED
OXYCODONE: NOT DETECTED
OXYMORPHONE: NOT DETECTED
PAIN MANAGEMENT DRUG PANEL: NORMAL
PCP: NOT DETECTED
PHENTERMINE: NOT DETECTED
PROPOXYPHENE: NOT DETECTED
TAPENTADOL, URINE: NOT DETECTED
TAPENTADOL-O-SULFATE, URINE: NOT DETECTED
TEMAZEPAM: NOT DETECTED
TRAMADOL: PRESENT
URINE CULTURE, ROUTINE: ABNORMAL
URINE CULTURE, ROUTINE: ABNORMAL
ZOLPIDEM: NOT DETECTED

## 2019-05-03 NOTE — RESULT ENCOUNTER NOTE
Please notify Maria Fernanda Perry that urine culture shows bacterial growth that is covered by cipro. Continue taking the antibiotic. . Follow up as scheduled and as needed.

## 2019-05-24 ENCOUNTER — HOSPITAL ENCOUNTER (OUTPATIENT)
Dept: WOMENS IMAGING | Age: 64
Discharge: HOME | End: 2019-05-26
Payer: MEDICARE

## 2019-05-24 DIAGNOSIS — Z12.39 BREAST CANCER SCREENING: ICD-10-CM

## 2019-05-24 PROCEDURE — 77067 SCR MAMMO BI INCL CAD: CPT

## 2019-06-15 ENCOUNTER — HOSPITAL ENCOUNTER (OUTPATIENT)
Age: 64
Setting detail: SPECIMEN
Discharge: HOME | End: 2019-06-15
Payer: MEDICARE

## 2019-06-15 PROCEDURE — 87077 CULTURE AEROBIC IDENTIFY: CPT

## 2019-06-15 PROCEDURE — 87086 URINE CULTURE/COLONY COUNT: CPT

## 2019-06-15 PROCEDURE — 87186 SC STD MICRODIL/AGAR DIL: CPT

## 2019-06-17 LAB
ORGANISM: ABNORMAL
URINE CULTURE, ROUTINE: ABNORMAL
URINE CULTURE, ROUTINE: ABNORMAL

## 2019-07-09 ENCOUNTER — TELEPHONE (OUTPATIENT)
Dept: FAMILY MEDICINE CLINIC | Age: 64
End: 2019-07-09

## 2019-07-11 ENCOUNTER — HOSPITAL ENCOUNTER (OUTPATIENT)
Dept: PHYSICAL THERAPY | Age: 64
Setting detail: THERAPIES SERIES
Discharge: HOME | End: 2019-07-11
Payer: MEDICARE

## 2019-07-11 PROCEDURE — 97163 PT EVAL HIGH COMPLEX 45 MIN: CPT

## 2019-07-11 ASSESSMENT — PAIN DESCRIPTION - LOCATION: LOCATION: BUTTOCKS

## 2019-07-11 ASSESSMENT — PAIN DESCRIPTION - FREQUENCY: FREQUENCY: CONTINUOUS

## 2019-07-11 ASSESSMENT — PAIN DESCRIPTION - DESCRIPTORS: DESCRIPTORS: PRESSURE;STABBING

## 2019-07-11 ASSESSMENT — PAIN DESCRIPTION - ORIENTATION: ORIENTATION: RIGHT

## 2019-07-11 ASSESSMENT — PAIN SCALES - GENERAL: PAINLEVEL_OUTOF10: 8

## 2019-07-11 NOTE — PROGRESS NOTES
Jermaine fajardo [address]     Ph: [phone]  Fax: [phone]    [x] Certification  [] Recertification [x]  Plan of Care  [] Progress Note [] Discharge      To:  Referring Practitioner: Pippa Conde      From:  Modesta Longoria PT  Patient: Steven Amador     : 1955  Diagnosis: Dysuria; Myofascial Pain; Pelvic Floor Tension     Date: 2019  Treatment Diagnosis: Urinary Urge Incontinence; Incomplete Voiding; Impaired Strength; Impaired mm Activation Patterns; SIJD    Plan of Care/Certification Expiration Date: (TBD)  Progress Report Period from:  2019  to 2019    Total # of Visits to Date: 1   No Show: 0    Canceled Appointment: 0     OBJECTIVE:   Short Term Goals -      Goals Current/Discharge status  Met   Short term goal 1: Dec LBP and R LE sxs to </= 5/10 at worse     Pain Location: Buttocks    Pain Level: 8(Weekly range: 5/10 at rest  to 9/10)    Pain Descriptors: Pressure, Stabbing       [] yes  [x] no   Short term goal 2: Inc strength B hip Abd and ext to ; >/= 4+/5 to improve transfers  Strength RLE  Comment: SLR 5/5;  Abd 4/5;  Ext 4/5 ( abd and ext msrd in standing ); knee >/= 4+/5;  DF 5/5  Strength LLE  Comment: SLR 5/5;  Abd 4/5;  Ext 4/5 ( abd and ext msrd in standing )knee >/= 4+/5; DF 5/5           [] yes  [x] no   Short term goal 3: Inc strength TA to demo lumbopelvic stability w/ TA march and ability to fire TA prior to OA  to improve bed mobs  Strength Other    lumbo-pelvic instability w/ TA march; No TA activity w/ curl up [] yes  [x] no   Short term goal 4: Pt to demo  ability to activate and relax PFM to dec voiding frequency to </= 12 x / day  and dec frequency of UTI's  Other: trace palpable PFM ( external palpation) even w/ glute set;  Voids every 30 mins [] yes  [x] no   Short term goal 5: Dec RAW scores of: POPDI-6 to 12.  CRADI-8 to 10, PFDI-6 to 9 to demo improved function POPDI-6 16;  CRADI-8 14;  JAGUAR-6 12 []  yes  [x]  no         Body structures, Functions, Activity limitations: Decreased functional mobility , Decreased ADL status, Decreased ROM, Decreased strength, Increased Pain, Decreased high-level IADLs  Assessment: Pt presents w/ >3 yr h/o hernia sx followed by symptoms of urinary urgency and incomplete voiding that are progressing, and presents w/ h/o and cc/o LBP w/ R LE radicular sxs; Pt demo's dec strength of core, PFMs and B hips, poor activation  pattern of core mms, SIJD w/ hypomobility and malalignment, (+) disc derangement w/ ext  Bias;  Dec function w/ transfers, stairs, household chores, frequent urinary voids interfering w/ roles as mother, wife. Prognosis: Fair      New Education Provided: on POC,          PLAN: [x] Evaluate and Treat  Frequency/Duration:  Plan  Times per week: 1-2  Plan weeks: 12 visits or as auth by ins  Current Treatment Recommendations: Strengthening, ROM, Balance Training, Neuromuscular Re-education, Manual Therapy - Soft Tissue Mobilization, Manual Therapy - Joint Manipulation, Pain Management, Home Exercise Program, Modalities                ? Patient Status:[x] Continue/ Initiate plan of Care    [] Discharge PT. Recommend pt continue with HEP. [] Additional visits requested, Please re-certify for additional visits:          Signature: Electronically signed by Svetlana Baldwin PT on 7/11/19 at 3:50 PM      If you have any questions or concerns, please don't hesitate to call. Thank you for your referral.    I have reviewed this plan of care and certify a need for medically necessary rehabilitation services.     Physician Signature:__________________________________________________________  Date:  Please sign and return

## 2019-07-15 RX ORDER — MELOXICAM 15 MG/1
15 TABLET ORAL DAILY
Qty: 30 TABLET | Refills: 1 | Status: SHIPPED | OUTPATIENT
Start: 2019-07-15 | End: 2019-11-26 | Stop reason: SDUPTHER

## 2019-07-23 ENCOUNTER — TELEPHONE (OUTPATIENT)
Dept: FAMILY MEDICINE CLINIC | Age: 64
End: 2019-07-23

## 2019-07-25 ENCOUNTER — HOSPITAL ENCOUNTER (OUTPATIENT)
Dept: PHYSICAL THERAPY | Age: 64
Setting detail: THERAPIES SERIES
Discharge: HOME | End: 2019-07-25
Payer: MEDICARE

## 2019-07-25 ENCOUNTER — TELEPHONE (OUTPATIENT)
Dept: FAMILY MEDICINE CLINIC | Age: 64
End: 2019-07-25

## 2019-07-25 DIAGNOSIS — G89.29 CHRONIC RIGHT-SIDED LOW BACK PAIN WITH RIGHT-SIDED SCIATICA: Chronic | ICD-10-CM

## 2019-07-25 DIAGNOSIS — M54.41 CHRONIC RIGHT-SIDED LOW BACK PAIN WITH RIGHT-SIDED SCIATICA: Chronic | ICD-10-CM

## 2019-07-25 DIAGNOSIS — G89.29 CHRONIC PELVIC PAIN IN FEMALE: ICD-10-CM

## 2019-07-25 DIAGNOSIS — R10.2 CHRONIC PELVIC PAIN IN FEMALE: ICD-10-CM

## 2019-07-25 PROCEDURE — 97112 NEUROMUSCULAR REEDUCATION: CPT

## 2019-07-25 PROCEDURE — 97110 THERAPEUTIC EXERCISES: CPT

## 2019-07-25 RX ORDER — PREGABALIN 75 MG/1
75 CAPSULE ORAL 3 TIMES DAILY
Qty: 270 CAPSULE | Refills: 0 | OUTPATIENT
Start: 2019-07-25 | End: 2019-10-23

## 2019-07-25 ASSESSMENT — PAIN SCALES - GENERAL: PAINLEVEL_OUTOF10: 9

## 2019-07-25 ASSESSMENT — PAIN DESCRIPTION - LOCATION: LOCATION: BUTTOCKS

## 2019-07-25 ASSESSMENT — PAIN DESCRIPTION - DESCRIPTORS: DESCRIPTORS: PRESSURE;STABBING

## 2019-07-26 RX ORDER — PREGABALIN 75 MG/1
75 CAPSULE ORAL 3 TIMES DAILY
Qty: 21 CAPSULE | Refills: 0 | Status: SHIPPED | OUTPATIENT
Start: 2019-07-26 | End: 2019-11-26 | Stop reason: SDUPTHER

## 2019-07-30 ENCOUNTER — TELEPHONE (OUTPATIENT)
Dept: FAMILY MEDICINE CLINIC | Age: 64
End: 2019-07-30

## 2019-07-30 ENCOUNTER — OFFICE VISIT (OUTPATIENT)
Dept: FAMILY MEDICINE CLINIC | Age: 64
End: 2019-07-30
Payer: MEDICARE

## 2019-07-30 VITALS
WEIGHT: 150 LBS | OXYGEN SATURATION: 98 % | SYSTOLIC BLOOD PRESSURE: 112 MMHG | HEART RATE: 90 BPM | BODY MASS INDEX: 24.99 KG/M2 | HEIGHT: 65 IN | DIASTOLIC BLOOD PRESSURE: 62 MMHG | RESPIRATION RATE: 14 BRPM | TEMPERATURE: 97.9 F

## 2019-07-30 DIAGNOSIS — G89.29 CHRONIC PELVIC PAIN IN FEMALE: Primary | ICD-10-CM

## 2019-07-30 DIAGNOSIS — K59.03 DRUG-INDUCED CONSTIPATION: ICD-10-CM

## 2019-07-30 DIAGNOSIS — G89.29 CHRONIC RIGHT-SIDED LOW BACK PAIN WITH RIGHT-SIDED SCIATICA: Chronic | ICD-10-CM

## 2019-07-30 DIAGNOSIS — N20.0 KIDNEY STONE ON RIGHT SIDE: ICD-10-CM

## 2019-07-30 DIAGNOSIS — M54.41 CHRONIC RIGHT-SIDED LOW BACK PAIN WITH RIGHT-SIDED SCIATICA: Chronic | ICD-10-CM

## 2019-07-30 DIAGNOSIS — J44.9 CHRONIC OBSTRUCTIVE PULMONARY DISEASE, UNSPECIFIED COPD TYPE (HCC): ICD-10-CM

## 2019-07-30 DIAGNOSIS — R10.2 CHRONIC PELVIC PAIN IN FEMALE: Primary | ICD-10-CM

## 2019-07-30 DIAGNOSIS — M62.89 PELVIC FLOOR TENSION: ICD-10-CM

## 2019-07-30 DIAGNOSIS — L65.9 HAIR LOSS: ICD-10-CM

## 2019-07-30 DIAGNOSIS — R63.5 WEIGHT GAIN: ICD-10-CM

## 2019-07-30 PROCEDURE — 99214 OFFICE O/P EST MOD 30 MIN: CPT | Performed by: NURSE PRACTITIONER

## 2019-07-30 RX ORDER — PREGABALIN 75 MG/1
75 CAPSULE ORAL 3 TIMES DAILY
Qty: 270 CAPSULE | Refills: 0 | Status: SHIPPED | OUTPATIENT
Start: 2019-07-30 | End: 2019-12-06 | Stop reason: SDUPTHER

## 2019-07-30 RX ORDER — TRAMADOL HYDROCHLORIDE 50 MG/1
100 TABLET ORAL EVERY 8 HOURS PRN
Qty: 180 TABLET | Refills: 2 | Status: SHIPPED | OUTPATIENT
Start: 2019-07-30 | End: 2019-11-26 | Stop reason: SDUPTHER

## 2019-07-30 RX ORDER — TRAMADOL HYDROCHLORIDE 50 MG/1
100 TABLET ORAL EVERY 8 HOURS PRN
Qty: 180 TABLET | Refills: 2 | Status: SHIPPED | OUTPATIENT
Start: 2019-07-30 | End: 2019-07-30 | Stop reason: SDUPTHER

## 2019-07-30 RX ORDER — CIPROFLOXACIN 500 MG/1
TABLET, FILM COATED ORAL
Refills: 0 | COMMUNITY
Start: 2019-07-23 | End: 2019-11-26 | Stop reason: ALTCHOICE

## 2019-07-30 RX ORDER — PREGABALIN 75 MG/1
75 CAPSULE ORAL 3 TIMES DAILY
Qty: 21 CAPSULE | Refills: 0 | Status: CANCELLED | OUTPATIENT
Start: 2019-07-30 | End: 2019-08-06

## 2019-07-30 ASSESSMENT — PATIENT HEALTH QUESTIONNAIRE - PHQ9
SUM OF ALL RESPONSES TO PHQ QUESTIONS 1-9: 0
SUM OF ALL RESPONSES TO PHQ QUESTIONS 1-9: 0
SUM OF ALL RESPONSES TO PHQ9 QUESTIONS 1 & 2: 0
1. LITTLE INTEREST OR PLEASURE IN DOING THINGS: 0
2. FEELING DOWN, DEPRESSED OR HOPELESS: 0

## 2019-07-30 NOTE — PROGRESS NOTES
gain  T4, Free    TSH without Reflex       PLAN: Include orders in the DX section. Follow up: 3 months and as needed. Blood work to be done soon. AWV at next visit. 1. 2. 5.  She continues to follow with urology and gynecology through [facility] system. Is doing exercises with physical therapy for pelvic floor tension diagnosis. 3.  She is encouraged to continue probiotic tablets and occasional use of laxative with goal to eventually wean off of the Ultram.  She denies any significant symptoms associate with constipation. 4.  She was told that kidney stone on the right side is stable and urologist will continue to monitor. 6.  She has had increased cough and sputum production lately. Recommend antihistamine on a routine basis and will try Anoro inhaler for COPD. 7. 8.  We will check thyroid function with next lab. Orders given to be done in the near future in office to call. Controlled Substance Monitoring:    Acute and Chronic Pain Monitoring:   RX Monitoring 7/30/2019   Attestation -   Acute Pain Prescriptions -   Periodic Controlled Substance Monitoring Possible medication side effects, risk of tolerance/dependence & alternative treatments discussed. ;No signs of potential drug abuse or diversion identified. ;Assessed functional status. Chronic Pain > 80 MEDD -       Please note this report has been partially produced using speech recognition software and may cause contain errors related to that system including grammar, punctuation and spelling as well as words and phrases that may seem inappropriate. If there are questions or concerns please feel free to contact me to clarify.       Electronically signed by Travis Brewer [id number], 1:02 PM 7/30/19

## 2019-07-30 NOTE — TELEPHONE ENCOUNTER
We received a notification that Tramadol RX failed during escribing at 10:17 AM. I show we received a receipt confirmation at 11:20 AM that the pharmacy got it.

## 2019-07-31 ENCOUNTER — HOSPITAL ENCOUNTER (OUTPATIENT)
Dept: PHYSICAL THERAPY | Age: 64
Setting detail: THERAPIES SERIES
Discharge: HOME | End: 2019-07-31
Payer: MEDICARE

## 2019-07-31 PROCEDURE — 97112 NEUROMUSCULAR REEDUCATION: CPT

## 2019-07-31 PROCEDURE — 97140 MANUAL THERAPY 1/> REGIONS: CPT

## 2019-07-31 PROCEDURE — 97110 THERAPEUTIC EXERCISES: CPT

## 2019-07-31 ASSESSMENT — PAIN DESCRIPTION - ORIENTATION: ORIENTATION: RIGHT

## 2019-07-31 ASSESSMENT — PAIN SCALES - GENERAL: PAINLEVEL_OUTOF10: 9

## 2019-07-31 ASSESSMENT — PAIN DESCRIPTION - LOCATION: LOCATION: BUTTOCKS

## 2019-07-31 ASSESSMENT — PAIN DESCRIPTION - DESCRIPTORS: DESCRIPTORS: PRESSURE;STABBING

## 2019-07-31 ASSESSMENT — PAIN DESCRIPTION - PAIN TYPE: TYPE: CHRONIC PAIN

## 2019-07-31 NOTE — PROGRESS NOTES
butterfly stretch,                                Manual:   Manual therapy  Muscle energy: hypo L IS w/ stork and SFBT w/ L:  LE long, , aMET for dep pube; w/ good pelvic malleoli and sacral symmetry noted post ; 10 mins R LE radicular sxs centralized to mid buttocks            *Indicates exercise, modality, or manual techniques to be initiated when appropriate    Assessment:   Activity Tolerance  Activity Tolerance: bathroom break x 2 mins    Body structures, Functions, Activity limitations: Decreased functional mobility , Decreased ADL status, Decreased ROM, Decreased strength, Increased Pain, Decreased high-level IADLs  Assessment: Cont w/ MET to correct pelvic alignment , Samara exs w/ addition of standing exts to centralize R  LE radicular sxs; Progressed deep core strengthening and initiated exs to facilitate relaxation of PF; Pt required              Goals:  Short term goals  Short term goal 1: Dec LBP and R LE sxs to </= 5/10 at worse   Short term goal 2: Inc strength B hip Abd and ext to ; >/= 4+/5 to improve transfers  Short term goal 3: Inc strength TA to demo lumbopelvic stability w/ TA march and ability to fire TA prior to OA  to improve bed mobs  Short term goal 4: Pt to demo  ability to activate and relax PFM to dec voiding frequency to </= 12 x / day  and dec frequency of UTI's  Short term goal 5: Dec RAW scores of: POPDI-6 to 12. CRADI-8 to 10, PFDI-6 to 9 to demo improved function        Progress toward goals: core and pelvic floor strength    POST-PAIN       Pain Rating (0-10 pain scale):5 /10   Location and pain description same as pre-treatment unless indicated.    Action: [] NA   [x] Perform HEP  [] Meds as prescribed  [] Modalities as prescribed   [] Call Physician     Frequency/Duration:  Plan  Times per week: 1-2  Plan weeks: 12 visits or as auth by ins  Current Treatment Recommendations: Strengthening, ROM, Balance Training, Neuromuscular Re-education, Manual Therapy - Soft Tissue Mobilization, Manual Therapy - Joint Manipulation, Pain Management, Home Exercise Program, Modalities     Pt to continue current HEP. See objective section for any therapeutic exercise changes, additions or modifications this date.          PT Individual Minutes  Time In: 1546  Time Out: 1847  Minutes: 62  Timed Code Treatment Minutes: 58 Minutes  Procedure Minutes: 0    Signature:  Electronically signed by Cell Genesys, PT on 7/31/19 at 6:48 PM

## 2019-08-01 ENCOUNTER — HOSPITAL ENCOUNTER (OUTPATIENT)
Dept: PHYSICAL THERAPY | Age: 64
Setting detail: THERAPIES SERIES
End: 2019-08-01
Payer: MEDICARE

## 2019-08-07 ENCOUNTER — HOSPITAL ENCOUNTER (OUTPATIENT)
Dept: PHYSICAL THERAPY | Age: 64
Setting detail: THERAPIES SERIES
Discharge: HOME | End: 2019-08-07
Payer: MEDICARE

## 2019-08-07 PROCEDURE — 97140 MANUAL THERAPY 1/> REGIONS: CPT

## 2019-08-07 PROCEDURE — 97110 THERAPEUTIC EXERCISES: CPT

## 2019-08-07 ASSESSMENT — PAIN DESCRIPTION - ORIENTATION: ORIENTATION: RIGHT

## 2019-08-07 ASSESSMENT — PAIN DESCRIPTION - PAIN TYPE: TYPE: CHRONIC PAIN

## 2019-08-07 ASSESSMENT — PAIN DESCRIPTION - LOCATION: LOCATION: VAGINA;BACK

## 2019-08-07 ASSESSMENT — PAIN SCALES - GENERAL: PAINLEVEL_OUTOF10: 10

## 2019-08-07 NOTE — PROGRESS NOTES
diary issued and instructed on this date  Exercise 16: sitting w/ hip in ER  and arms in ER 3 x 20 s  Exercise 20: HEP standing hip extensions at counter, butterfly stretch,    vvManual:   Manual therapy  Soft Tissue Mobalization: Soft tissue work to right gluteal and right anterior pelvic region for tissue relaxation    Modalities:  Modalities  Moist heat:    Other: declined HP today     *Indicates exercise, modality, or manual techniques to be initiated when appropriate    Assessment:   Activity Tolerance  Activity Tolerance:  1 restroom break needed, unable to tolerate prone exercises today    Body structures, Functions, Activity limitations: Decreased functional mobility , Decreased ADL status, Decreased ROM, Decreased strength, Increased Pain, Decreased high-level IADLs  Assessment:  Continue to review HEP and proper exercises. Patient needs frequent  verbal cues for ther ex. Refused to lay prone secondary to back pain and overall not feeling well. Added clams and Nu step today for general exercise. Note reduced arm swing with gait and with attempts to normalize gait patient unable to coordinate arm swing with step. Treatment Diagnosis: Urinary Urge Incontinence; Incomplete Voiding; Impaired Strength; Impaired mm Activation Patterns; SIJD     PT Education: Home Exercise Program  Patient Education: Reviewed HEP as well as new additions    Goals:  Short term goals  Short term goal 1: Dec LBP and R LE sxs to </= 5/10 at worse   Short term goal 2: Inc strength B hip Abd and ext to ; >/= 4+/5 to improve transfers  Short term goal 3: Inc strength TA to demo lumbopelvic stability w/ TA march and ability to fire TA prior to OA  to improve bed mobs  Short term goal 4: Pt to demo  ability to activate and relax PFM to dec voiding frequency to </= 12 x / day  and dec frequency of UTI's  Short term goal 5: Dec RAW scores of: POPDI-6 to 12.  CRADI-8 to 10, PFDI-6 to 9 to demo improved function        Progress toward

## 2019-08-08 ENCOUNTER — HOSPITAL ENCOUNTER (OUTPATIENT)
Dept: PHYSICAL THERAPY | Age: 64
Setting detail: THERAPIES SERIES
Discharge: HOME | End: 2019-08-08
Payer: MEDICARE

## 2019-08-13 ENCOUNTER — HOSPITAL ENCOUNTER (OUTPATIENT)
Dept: PHYSICAL THERAPY | Age: 64
Setting detail: THERAPIES SERIES
Discharge: HOME | End: 2019-08-13
Payer: MEDICARE

## 2019-08-19 ENCOUNTER — HOSPITAL ENCOUNTER (OUTPATIENT)
Dept: PHYSICAL THERAPY | Age: 64
Setting detail: THERAPIES SERIES
Discharge: HOME | End: 2019-08-19
Payer: MEDICARE

## 2019-08-19 NOTE — PROGRESS NOTES
[address]       Physical Therapy  Cancellation/No-show Note  Patient Name:  Vincent Brady  :  1955   Date:  2019  Referring Practitioner: Waleska Salinas,  Diagnosis: Dysuria; Myofascial Pain; Pelvic Floor Tension    Visit Information:  PT Visit Information  Onset Date: 19  PT Insurance Information: BCBS 35.00 co-pay ( pt states it is 30.00)  Total # of Visits to Date: 5  Plan of Care/Certification Expiration Date: 19  No Show: 0  Canceled Appointment: 4  Progress Note Counter: 5/ Eval Only + 6 visits auth 19 to 19  Cx 8/119    For today's appointment patient:  [x]  Cancelled  []  Rescheduled appointment  []  No-show   []  Called pt to remind of next appointment     Reason given by patient:  [x]  Patient ill  []  Conflicting appointment  []  No transportation    []  Conflict with work  []  No reason given  []  Other:       Comments:       Signature: Electronically signed by Tiffany Riley PT on 19 at 12:17 PM

## 2019-08-26 ENCOUNTER — HOSPITAL ENCOUNTER (OUTPATIENT)
Dept: PHYSICAL THERAPY | Age: 64
Setting detail: THERAPIES SERIES
Discharge: HOME | End: 2019-08-26
Payer: MEDICARE

## 2019-08-26 NOTE — PROGRESS NOTES
Ken fajardo, [address]     Ph: [phone]  Fax: [phone]    [] Certification  [] Recertification []  Plan of Care  [x] Progress Note [] Discharge      To:  Dallin Wylie,      From:  Linus Lindsey PT  Patient: Keli Oliveira     : 1955  Diagnosis: Dysuria; Myofascial Pain; Pelvic Floor Tension     Date: 2019  Treatment Diagnosis: Urinary Urge Incontinence; Incomplete Voiding; Impaired Strength; Impaired mm Activation Patterns; SIJD    Plan of Care/Certification Expiration Date: 19  Progress Report Period from:  2019  to 2019    Total # of Visits to Date: 5   No Show: 0    Canceled Appointment: 5     OBJECTIVE:   Short Term Goals -      Goals Current/Discharge status  Met   Short term goal 1: Dec LBP and R LE sxs to </= 5/10 at worse   NT d/t pt has been ill missing 10/10 LB and pelvic pain reported on 19 [] yes  [x] no   Short term goal 2: Inc strength B hip Abd and ext to ; >/= 4+/5 to improve transfers  NT d/t pt has been ill missing last 4 appts [] yes  [] no   Short term goal 3: Inc strength TA to demo lumbopelvic stability w/ TA march and ability to fire TA prior to OA  to improve bed mobs  NT d/t pt has been ill missing last 4 appts [] yes  [] no   Short term goal 4: Pt to demo  ability to activate and relax PFM to dec voiding frequency to </= 12 x / day  and dec frequency of UTI's  NT d/t pt has been ill missing last 4 appts [] yes  [] no   Short term goal 5: Dec RAW scores of: POPDI-6 to 12.  CRADI-8 to 10, PFDI-6 to 9 to demo improved function  NT d/t pt has been ill missing last 4 appts []  yes  []  no         Body structures, Functions, Activity limitations: Decreased functional mobility , Decreased ADL status, Decreased ROM, Decreased strength, Increased Pain, Decreased high-level IADLs  Assessment: Pt was slowly progressing toward goals and did not report any LE radicular

## 2019-09-05 ENCOUNTER — HOSPITAL ENCOUNTER (OUTPATIENT)
Dept: PHYSICAL THERAPY | Age: 64
Setting detail: THERAPIES SERIES
Discharge: HOME | End: 2019-09-05
Payer: MEDICARE

## 2019-09-05 NOTE — PROGRESS NOTES
[address]       Physical Therapy  Cancellation/No-show Note  Patient Name:  Gavin Sousa  :  1955   Date:  2019  Referring Practitioner: Arie Dubois,  Diagnosis: Dysuria; Myofascial Pain; Pelvic Floor Tension    Visit Information:  PT Visit Information  Onset Date: 19  PT Insurance Information: BCBS 35.00 co-pay ( pt states it is 30.00)  Total # of Visits to Date: 5  Plan of Care/Certification Expiration Date: 19  No Show: 0  Canceled Appointment: 6  Progress Note Counter: 0/6 visits added w/  auth  to 19  Cx     For today's appointment patient:  [x]  Cancelled  []  Rescheduled appointment  []  No-show   []  Called pt to remind of next appointment     Reason given by patient:  []  Patient ill  []  Conflicting appointment  []  No transportation    []  Conflict with work  []  No reason given  [x]  Other:      Comments:   PT called pt who stated she was  dizzy from new medication for pelvic and bladder pain per phone conversation w/ pt .  Pt transferred to schedule another appt and stated she would be at next appt: Pt informed she could see Antonia Montenegro PTA during PT's absence next week    Signature: Electronically signed by Casey Rosales PT on 19 at 3:55 PM

## 2019-09-09 ENCOUNTER — HOSPITAL ENCOUNTER (OUTPATIENT)
Dept: PHYSICAL THERAPY | Age: 64
Setting detail: THERAPIES SERIES
Discharge: HOME | End: 2019-09-09
Payer: MEDICARE

## 2019-09-18 ENCOUNTER — HOSPITAL ENCOUNTER (OUTPATIENT)
Dept: PHYSICAL THERAPY | Age: 64
Setting detail: THERAPIES SERIES
Discharge: HOME | End: 2019-09-18
Payer: MEDICARE

## 2019-09-24 ENCOUNTER — HOSPITAL ENCOUNTER (OUTPATIENT)
Age: 64
Setting detail: SPECIMEN
Discharge: HOME | End: 2019-09-24
Payer: MEDICARE

## 2019-09-24 DIAGNOSIS — N30.00 ACUTE CYSTITIS WITHOUT HEMATURIA: ICD-10-CM

## 2019-09-24 PROCEDURE — 87086 URINE CULTURE/COLONY COUNT: CPT

## 2019-09-26 LAB — URINE CULTURE, ROUTINE: NORMAL

## 2019-10-28 ENCOUNTER — HOSPITAL ENCOUNTER (OUTPATIENT)
Age: 64
Setting detail: SPECIMEN
Discharge: HOME | End: 2019-10-28
Payer: MEDICARE

## 2019-10-28 PROCEDURE — 87077 CULTURE AEROBIC IDENTIFY: CPT

## 2019-10-28 PROCEDURE — 87186 SC STD MICRODIL/AGAR DIL: CPT

## 2019-10-28 PROCEDURE — 87086 URINE CULTURE/COLONY COUNT: CPT

## 2019-10-30 LAB
ORGANISM: ABNORMAL
URINE CULTURE, ROUTINE: ABNORMAL

## 2019-11-07 ENCOUNTER — TELEPHONE (OUTPATIENT)
Dept: FAMILY MEDICINE CLINIC | Age: 64
End: 2019-11-07

## 2019-11-07 RX ORDER — IBUPROFEN AND FAMOTIDINE 26.6; 8 MG/1; MG/1
1 TABLET, FILM COATED ORAL 3 TIMES DAILY
Qty: 90 TABLET | Refills: 3 | Status: SHIPPED | OUTPATIENT
Start: 2019-11-07 | End: 2019-11-26 | Stop reason: ALTCHOICE

## 2019-11-09 ENCOUNTER — TELEPHONE (OUTPATIENT)
Dept: FAMILY MEDICINE CLINIC | Age: 64
End: 2019-11-09

## 2019-11-12 RX ORDER — IBUPROFEN 800 MG/1
800 TABLET ORAL 2 TIMES DAILY PRN
Qty: 180 TABLET | Refills: 1 | Status: SHIPPED | OUTPATIENT
Start: 2019-11-12 | End: 2019-11-26 | Stop reason: ALTCHOICE

## 2019-11-26 ENCOUNTER — HOSPITAL ENCOUNTER (OUTPATIENT)
Dept: CT IMAGING | Age: 64
Discharge: HOME | End: 2019-11-28
Payer: MEDICARE

## 2019-11-26 ENCOUNTER — OFFICE VISIT (OUTPATIENT)
Dept: FAMILY MEDICINE CLINIC | Age: 64
End: 2019-11-26
Payer: MEDICARE

## 2019-11-26 VITALS
WEIGHT: 150 LBS | OXYGEN SATURATION: 97 % | HEART RATE: 100 BPM | HEIGHT: 65 IN | BODY MASS INDEX: 24.99 KG/M2 | RESPIRATION RATE: 14 BRPM | DIASTOLIC BLOOD PRESSURE: 70 MMHG | TEMPERATURE: 98.5 F | SYSTOLIC BLOOD PRESSURE: 108 MMHG

## 2019-11-26 DIAGNOSIS — M54.41 CHRONIC RIGHT-SIDED LOW BACK PAIN WITH RIGHT-SIDED SCIATICA: Chronic | ICD-10-CM

## 2019-11-26 DIAGNOSIS — G89.29 CHRONIC PELVIC PAIN IN FEMALE: ICD-10-CM

## 2019-11-26 DIAGNOSIS — R10.2 CHRONIC PELVIC PAIN IN FEMALE: ICD-10-CM

## 2019-11-26 DIAGNOSIS — G89.29 CHRONIC RIGHT-SIDED LOW BACK PAIN WITH RIGHT-SIDED SCIATICA: Chronic | ICD-10-CM

## 2019-11-26 DIAGNOSIS — N20.0 URIC ACID NEPHROLITHIASIS: ICD-10-CM

## 2019-11-26 PROCEDURE — 74176 CT ABD & PELVIS W/O CONTRAST: CPT

## 2019-11-26 PROCEDURE — 99213 OFFICE O/P EST LOW 20 MIN: CPT | Performed by: NURSE PRACTITIONER

## 2019-11-26 RX ORDER — MELOXICAM 15 MG/1
15 TABLET ORAL DAILY
Qty: 30 TABLET | Refills: 3 | Status: SHIPPED | OUTPATIENT
Start: 2019-11-26 | End: 2020-05-21

## 2019-11-26 RX ORDER — TRAMADOL HYDROCHLORIDE 50 MG/1
TABLET ORAL
Status: ON HOLD | COMMUNITY
Start: 2015-12-14 | End: 2020-07-28 | Stop reason: HOSPADM

## 2019-11-26 RX ORDER — TRAMADOL HYDROCHLORIDE 50 MG/1
TABLET ORAL
Status: CANCELLED | OUTPATIENT
Start: 2019-11-26

## 2019-11-26 RX ORDER — TRAMADOL HYDROCHLORIDE 50 MG/1
100 TABLET ORAL EVERY 8 HOURS PRN
Qty: 180 TABLET | Refills: 2 | Status: SHIPPED | OUTPATIENT
Start: 2019-11-26 | End: 2020-03-23 | Stop reason: SDUPTHER

## 2019-11-26 RX ORDER — DOCUSATE SODIUM 100 MG/1
CAPSULE, LIQUID FILLED ORAL
COMMUNITY
Start: 2015-07-17 | End: 2020-07-26 | Stop reason: ALTCHOICE

## 2019-11-26 RX ORDER — METHENAMINE, SODIUM PHOSPHATE, MONOBASIC, MONOHYDRATE, PHENYL SALICYLATE, METHYLENE BLUE, AND HYOSCYAMINE SULFATE 120; 40.8; 36; 10; .12 MG/1; MG/1; MG/1; MG/1; MG/1
CAPSULE ORAL
Refills: 2 | COMMUNITY
Start: 2019-11-09 | End: 2020-07-31 | Stop reason: ALTCHOICE

## 2019-11-26 ASSESSMENT — PATIENT HEALTH QUESTIONNAIRE - PHQ9
SUM OF ALL RESPONSES TO PHQ9 QUESTIONS 1 & 2: 2
SUM OF ALL RESPONSES TO PHQ QUESTIONS 1-9: 2
SUM OF ALL RESPONSES TO PHQ QUESTIONS 1-9: 2
2. FEELING DOWN, DEPRESSED OR HOPELESS: 1
1. LITTLE INTEREST OR PLEASURE IN DOING THINGS: 1

## 2019-12-06 DIAGNOSIS — G89.29 CHRONIC RIGHT-SIDED LOW BACK PAIN WITH RIGHT-SIDED SCIATICA: Chronic | ICD-10-CM

## 2019-12-06 DIAGNOSIS — M54.41 CHRONIC RIGHT-SIDED LOW BACK PAIN WITH RIGHT-SIDED SCIATICA: Chronic | ICD-10-CM

## 2019-12-06 DIAGNOSIS — R10.2 CHRONIC PELVIC PAIN IN FEMALE: ICD-10-CM

## 2019-12-06 DIAGNOSIS — G89.29 CHRONIC PELVIC PAIN IN FEMALE: ICD-10-CM

## 2019-12-06 RX ORDER — PREGABALIN 75 MG/1
75 CAPSULE ORAL 3 TIMES DAILY
Qty: 270 CAPSULE | Refills: 0 | Status: SHIPPED | OUTPATIENT
Start: 2019-12-06 | End: 2020-03-20 | Stop reason: SDUPTHER

## 2020-01-01 NOTE — TELEPHONE ENCOUNTER
Please let the patient know that urine test shows UTI. Antibiotic sent to local pharmacy. The patient is instructed to take Probiotic tablets twice a day for the duration of antibiotic therapy and for 4 days after completion of antibiotics. This will help restore the good bacteria to your colon and prevent side effects of antibiotic therapy such as cramping and diarrhea. Probiotic tablets can be found at your local pharmacy over the counter. Ask your pharmacist if you need help finding tablets. 2020

## 2020-01-09 ENCOUNTER — TELEPHONE (OUTPATIENT)
Dept: FAMILY MEDICINE CLINIC | Age: 65
End: 2020-01-09

## 2020-01-09 ENCOUNTER — HOSPITAL ENCOUNTER (OUTPATIENT)
Age: 65
Setting detail: SPECIMEN
Discharge: HOME | End: 2020-01-09
Payer: MEDICARE

## 2020-01-09 LAB
BACTERIA: ABNORMAL /HPF
BILIRUBIN URINE: NEGATIVE
BLOOD, URINE: ABNORMAL
CLARITY: ABNORMAL
COLOR: YELLOW
EPITHELIAL CELLS, UA: ABNORMAL /HPF (ref 0–5)
GLUCOSE URINE: NEGATIVE MG/DL
HYALINE CASTS: ABNORMAL /HPF (ref 0–5)
KETONES, URINE: NEGATIVE MG/DL
LEUKOCYTE ESTERASE, URINE: ABNORMAL
NITRITE, URINE: POSITIVE
PH UA: >=9 (ref 5–9)
PROTEIN UA: ABNORMAL MG/DL
RBC UA: ABNORMAL /HPF (ref 0–5)
SPECIFIC GRAVITY UA: 1.01 (ref 1–1.03)
UROBILINOGEN, URINE: 0.2 E.U./DL
WBC UA: >100 /HPF (ref 0–5)

## 2020-01-09 PROCEDURE — 87186 SC STD MICRODIL/AGAR DIL: CPT

## 2020-01-09 PROCEDURE — 87086 URINE CULTURE/COLONY COUNT: CPT

## 2020-01-09 PROCEDURE — 81001 URINALYSIS AUTO W/SCOPE: CPT

## 2020-01-09 PROCEDURE — 87077 CULTURE AEROBIC IDENTIFY: CPT

## 2020-01-09 NOTE — TELEPHONE ENCOUNTER
It would be advised to have an appointment but if she prefers, a cup can be picked up from our office but then taken to [facility] to have the test run. We will call her when results are available. Order in the chart.

## 2020-01-10 ENCOUNTER — TELEPHONE (OUTPATIENT)
Dept: FAMILY MEDICINE CLINIC | Age: 65
End: 2020-01-10

## 2020-01-10 RX ORDER — CIPROFLOXACIN 500 MG/1
TABLET, FILM COATED ORAL
Qty: 14 TABLET | Refills: 0 | Status: SHIPPED | OUTPATIENT
Start: 2020-01-10 | End: 2020-02-14 | Stop reason: SDUPTHER

## 2020-01-10 NOTE — RESULT ENCOUNTER NOTE
Please notify Matias Mendes that urinalysis does show evidence of UTI. Urine culture results are not back yet but I can go ahead and send an antibiotic into her pharmacy of choice. Please let me know.

## 2020-01-11 LAB
ORGANISM: ABNORMAL
URINE CULTURE, ROUTINE: ABNORMAL

## 2020-02-13 ENCOUNTER — HOSPITAL ENCOUNTER (OUTPATIENT)
Dept: LAB | Age: 65
Discharge: HOME | End: 2020-02-13
Payer: MEDICARE

## 2020-02-13 ENCOUNTER — TELEPHONE (OUTPATIENT)
Dept: FAMILY MEDICINE CLINIC | Age: 65
End: 2020-02-13

## 2020-02-13 LAB
BACTERIA: NEGATIVE /HPF
BILIRUBIN URINE: NEGATIVE
BLOOD, URINE: NEGATIVE
CLARITY: CLEAR
COLOR: ABNORMAL
EPITHELIAL CELLS, UA: NORMAL /HPF (ref 0–5)
GLUCOSE URINE: NEGATIVE MG/DL
HYALINE CASTS: NORMAL /HPF (ref 0–5)
KETONES, URINE: NEGATIVE MG/DL
LEUKOCYTE ESTERASE, URINE: ABNORMAL
NITRITE, URINE: POSITIVE
PH UA: 7.5 (ref 5–9)
PROTEIN UA: NEGATIVE MG/DL
RBC UA: NORMAL /HPF (ref 0–5)
SPECIFIC GRAVITY UA: 1 (ref 1–1.03)
UROBILINOGEN, URINE: 1 E.U./DL
WBC UA: NORMAL /HPF (ref 0–5)

## 2020-02-13 PROCEDURE — 87186 SC STD MICRODIL/AGAR DIL: CPT

## 2020-02-13 PROCEDURE — 81001 URINALYSIS AUTO W/SCOPE: CPT

## 2020-02-13 PROCEDURE — 87077 CULTURE AEROBIC IDENTIFY: CPT

## 2020-02-13 PROCEDURE — 87086 URINE CULTURE/COLONY COUNT: CPT

## 2020-02-13 NOTE — TELEPHONE ENCOUNTER
Orders printed. She can have the sample done at [facility] lab today. Will call with results when available.

## 2020-02-14 RX ORDER — CIPROFLOXACIN 500 MG/1
TABLET, FILM COATED ORAL
Qty: 14 TABLET | Refills: 0 | Status: SHIPPED | OUTPATIENT
Start: 2020-02-14 | End: 2020-07-26 | Stop reason: ALTCHOICE

## 2020-02-17 NOTE — RESULT ENCOUNTER NOTE
Please notify Gretel Bhatia that urine culture shows bacterial growth that is covered by the antibiotics given. Follow up if no improvement in symptoms.

## 2020-03-09 RX ORDER — PREGABALIN 75 MG/1
75 CAPSULE ORAL 3 TIMES DAILY
Qty: 270 CAPSULE | Refills: 0 | OUTPATIENT
Start: 2020-03-09 | End: 2020-06-07

## 2020-03-09 NOTE — TELEPHONE ENCOUNTER
Patient requesting medication refill. Please approve or deny this request.    Rx requested:  Requested Prescriptions     Pending Prescriptions Disp Refills    pregabalin (LYRICA) 75 MG capsule 270 capsule 0     Sig: Take 1 capsule by mouth 3 times daily for 90 days. Last Office Visit:   11/26/2019      Next Visit Date:  No future appointments.

## 2020-03-17 ENCOUNTER — HOSPITAL ENCOUNTER (OUTPATIENT)
Age: 65
Setting detail: SPECIMEN
Discharge: HOME | End: 2020-03-17
Payer: MEDICARE

## 2020-03-17 LAB
BACTERIA: NEGATIVE /HPF
BILIRUBIN URINE: NEGATIVE
BLOOD, URINE: NEGATIVE
CLARITY: CLEAR
COLOR: YELLOW
EPITHELIAL CELLS, UA: NORMAL /HPF
GLUCOSE URINE: NEGATIVE MG/DL
KETONES, URINE: NEGATIVE MG/DL
LEUKOCYTE ESTERASE, URINE: ABNORMAL
NITRITE, URINE: NEGATIVE
PH UA: >=9 (ref 5–9)
PROTEIN UA: NEGATIVE MG/DL
RBC UA: NORMAL /HPF (ref 0–2)
SPECIFIC GRAVITY UA: 1 (ref 1–1.03)
URINE REFLEX TO CULTURE: YES
UROBILINOGEN, URINE: 0.2 E.U./DL
WBC UA: NORMAL /HPF (ref 0–5)

## 2020-03-17 PROCEDURE — 81001 URINALYSIS AUTO W/SCOPE: CPT

## 2020-03-18 LAB
REASON FOR REJECTION: NORMAL
REJECTED TEST: NORMAL

## 2020-03-20 RX ORDER — PREGABALIN 75 MG/1
75 CAPSULE ORAL 3 TIMES DAILY
Qty: 270 CAPSULE | Refills: 0 | Status: SHIPPED | OUTPATIENT
Start: 2020-03-20 | End: 2020-07-21 | Stop reason: SDUPTHER

## 2020-03-23 ENCOUNTER — TELEPHONE (OUTPATIENT)
Dept: FAMILY MEDICINE CLINIC | Age: 65
End: 2020-03-23

## 2020-03-23 RX ORDER — TRAMADOL HYDROCHLORIDE 50 MG/1
100 TABLET ORAL EVERY 8 HOURS PRN
Qty: 180 TABLET | Refills: 0 | Status: SHIPPED | OUTPATIENT
Start: 2020-03-23 | End: 2020-04-03 | Stop reason: SDUPTHER

## 2020-03-23 NOTE — TELEPHONE ENCOUNTER
Rx sent.      Please encourage her to sign up for mychart so that e-visit or virtual visit can be done next month

## 2020-04-03 ENCOUNTER — VIRTUAL VISIT (OUTPATIENT)
Dept: FAMILY MEDICINE CLINIC | Age: 65
End: 2020-04-03
Payer: MEDICARE

## 2020-04-03 PROCEDURE — 99214 OFFICE O/P EST MOD 30 MIN: CPT | Performed by: NURSE PRACTITIONER

## 2020-04-03 RX ORDER — TRAMADOL HYDROCHLORIDE 50 MG/1
100 TABLET ORAL EVERY 8 HOURS PRN
Qty: 180 TABLET | Refills: 0 | Status: SHIPPED | OUTPATIENT
Start: 2020-04-03 | End: 2020-04-27 | Stop reason: SDUPTHER

## 2020-04-03 RX ORDER — IBUPROFEN/DIPHENHYDRAMINE CIT 200MG-38MG
1 TABLET ORAL ONCE
Qty: 1 EACH | Refills: 0 | Status: SHIPPED | OUTPATIENT
Start: 2020-04-03 | End: 2020-12-07

## 2020-04-06 ENCOUNTER — VIRTUAL VISIT (OUTPATIENT)
Dept: INFECTIOUS DISEASES | Age: 65
End: 2020-04-06
Payer: MEDICARE

## 2020-04-06 PROCEDURE — 99204 OFFICE O/P NEW MOD 45 MIN: CPT | Performed by: INTERNAL MEDICINE

## 2020-04-06 RX ORDER — LEVOFLOXACIN 500 MG/1
500 TABLET, FILM COATED ORAL DAILY
Qty: 10 TABLET | Refills: 0 | Status: SHIPPED | OUTPATIENT
Start: 2020-04-06 | End: 2020-04-16

## 2020-04-06 NOTE — PROGRESS NOTES
Smokeless tobacco: Never Used    Tobacco comment: relaxes me   Substance and Sexual Activity    Alcohol use: Yes     Alcohol/week: 5.0 standard drinks     Types: 5 Cans of beer per week     Comment: social    Drug use: No    Sexual activity: Yes     Partners: Male   Lifestyle    Physical activity     Days per week: Not on file     Minutes per session: Not on file    Stress: Not on file   Relationships    Social connections     Talks on phone: Not on file     Gets together: Not on file     Attends Religion service: Not on file     Active member of club or organization: Not on file     Attends meetings of clubs or organizations: Not on file     Relationship status: Not on file    Intimate partner violence     Fear of current or ex partner: Not on file     Emotionally abused: Not on file     Physically abused: Not on file     Forced sexual activity: Not on file   Other Topics Concern    Not on file   Social History Narrative    ** Merged History Encounter **            Family History   Problem Relation Age of Onset    Heart Disease Mother     Other Mother         dementia    Arthritis Father        Current Outpatient Medications on File Prior to Visit   Medication Sig Dispense Refill    traMADol (ULTRAM) 50 MG tablet Take 2 tablets by mouth every 8 hours as needed for Pain for up to 92 days. TAKE 2 TABLETS BY MOUTH EVERY 8 HOURS AS NEEDED FOR PAIN 180 tablet 0    Masks (FACE MASK EARLOOP-STYLE) MISC 1 Device by Does not apply route once for 1 dose 1 each 0    pregabalin (LYRICA) 75 MG capsule Take 1 capsule by mouth 3 times daily for 90 days. 270 capsule 0    ciprofloxacin (CIPRO) 500 MG tablet TAKE 1 TABLET BY MOUTH TWICE DAILY for 7 days 14 tablet 0    traMADol (ULTRAM) 50 MG tablet Take by mouth.       docusate sodium (COLACE) 100 MG capsule Take by mouth      Meth-Hyo-M Bl-Na Phos-Ph Sal (URIBEL) 118 MG CAPS TAKE 1 CAPSULE BY MOUTH 4 TIMES DAILY AS NEEDED FOR BLADDER PAIN (URGENCY)  2    or slurred speech when speaking to the patient      Labs: I have reviewed all lab results by electronic record, including most recent CBC, metabolic panel, and pertinent abnormalities were addressed from an infectious disease perspective. WBC trends are being monitored. Lab Results   Component Value Date     01/05/2019    K 4.4 01/05/2019     01/05/2019    CO2 23 01/05/2019    BUN 13 01/05/2019    CREATININE 0.67 01/05/2019    GLUCOSE 109 01/05/2019    CALCIUM 9.6 01/05/2019      Lab Results   Component Value Date    WBC 9.8 01/05/2019    HGB 13.8 01/05/2019    HCT 41.2 01/05/2019    MCV 94.0 01/05/2019     01/05/2019       Radiology:   I have reviewed imaging results per electronic record and most pertinent abnormalities are being addressed from an infectious disease standpoint. 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 Plan:  Discussed with patient ways to help prevent recurrent UTIs, including but not limited to timed voiding, cranberry supplement, and maintaining good overall hygiene. Handwashing is essential.   UA and UC if she is safely able to give a specimen to the lab  Will try 10 days of PO Levaquin with dietary constraints - called into pharmacy  Discussed cranberry supplement - states that she has bottles of cranberry pills at home. She is also on D-mannose. Discussed warm water rinses after bowel movements and even after urination. States that she takes metamucil for constipation    We will follow up in 2 weeks. Patient counseled regarding COVID-19 precautions, including but not limited to social distancing, handwashing, and maintaining safe distance of 6ft. Other individual concerns regarding COVID-19 were also addressed to the best of my ability using current recommendations as provided by the [address], CDC, and WHO.  Signs and symptoms of possible infection were also discussed as well as what it means to

## 2020-04-08 ENCOUNTER — TELEPHONE (OUTPATIENT)
Dept: INFECTIOUS DISEASES | Age: 65
End: 2020-04-08

## 2020-04-08 NOTE — TELEPHONE ENCOUNTER
Call to patient. She states she has started the Levaquin, and has cranberry supplements.  Confirms she will F/u in 2 wks with a V/v on 4-21-20

## 2020-04-21 ENCOUNTER — VIRTUAL VISIT (OUTPATIENT)
Dept: INFECTIOUS DISEASES | Age: 65
End: 2020-04-21
Payer: MEDICARE

## 2020-04-21 PROCEDURE — 99213 OFFICE O/P EST LOW 20 MIN: CPT | Performed by: INTERNAL MEDICINE

## 2020-04-21 NOTE — PROGRESS NOTES
2020    TELEHEALTH EVALUATION -- Audio/Visual (During FXOQY-99 public health emergency)      Shayna Mcbride (:  1955) has requested an audio/video evaluation for the following concern(s):    Recurrent UTI     Due to the COVID-19 outbreak, patient's office visit was converted to a virtual visit. Patient was contacted and agreed to proceed with a virtual visit with me via Doxy. me with my location at the office. Patient reports that they are located at home. The risks and benefits of converting to a virtual visit were discussed in light of the current infectious disease epidemic. Services were provided through an audio/video synchronous discussion virtually to substitute for in person clinic visit. THIS virtual visit was conducted via interactive/real-time audio/video. Due to this being a TeleHealth encounter, evaluation of the following organ systems is limited: Vitals/Constitutional/EENT/Resp/CV/GI//MS/Neuro/Skin/Heme-Lymph-Imm.}    Pursuant to the emergency declaration under the [facility], [facility]5 waiver authority and the [employer] Act, this Virtual Visit was conducted, with patient's consent, to reduce the patient's risk of exposure to COVID-19 and provide care for the patient. Shayna Mcbride  1955  female  Medical Record Number: [id number]    Patient visited with Dr Rufus Castellano 2 weeks ago. Finished 10 days Levaquin with persistent sxs of dysuria and frequency, L hip, pain related to sciatica. Has H/O kidney stones, neurogenic bladder and postvoid residuals, saw Dr Dante Osborn and Irma Lofton in the past. Did not repeat her urine test. No abdominal or falnk pain, no vaginal discharge. Has H/O LISA. No fevers or nocturia, occasional incontinence. Records were reviewed including previous Cx with Pseudomonas and CT A/P. Patient with hx of Bowens disease, now presenting for dysuria and UTIs.    3/17/20: UA only small amount of LE.   2/13/2020: pseudomonas (multi sensitive)  1/9/2020: Ecoli (R amp and bactrim)  10/2019: E coli ( R bactrim)  9/2019: negative  8/2019: Pseudomonas ( I Aztreonam )     Tells me that she has issues with come urinary incontinence with pressure and constipation. She has been to uro-gyn and did not have any sign of prolapse. Has taken cipro in the past, but usually has ice cream every evening. No one has discussed dietary constraints with quinolones. Subjectively, + dysuria       Review of Systems: All 14 review of systems were discussed with the patient and are negative other than as stated above      Past Medical History:   Diagnosis Date    Bowen's disease     on buttock    Chronic back pain     Chronic kidney disease     COPD [facility] obstructive [facility] disease) [facility]     Irritable bowel syndrome with constipation 1/27/2017    Lumbosacral spondylosis without myelopathy- Dr. Willadean Opitz Osteopenia     Sciatica     Venous insufficiency        Past Surgical History:   Procedure Laterality Date    COLONOSCOPY      CYSTOSCOPY N/A 2014    x2    FOREIGN BODY REMOVAL Right 04/21/15    PARTIAL, GROIN    HERNIA REPAIR      double hernia oct.  2 2013    HYSTERECTOMY  7/14/11    bso Jayant Nair    KIDNEY STONE SURGERY      x2    OTHER SURGICAL HISTORY      laser surgery facundo dx    FL COLON CA SCRN NOT HI RSK IND N/A 4/19/2017    COLONOSCOPY performed by Shaila Melton MD at [address] N/A 10/19/2016    ANAL PROCTO SIGMOIDOSCOPY RIGID / excision perineal nodule performed by Lucius Dawn MD at [facility] [address] History     Socioeconomic History    Marital status:      Spouse name: Not on file    Number of children: Not on file    Years of education: Not on file    Highest education level: Not on file   Occupational History    Occupation: cook   Social Needs    Financial resource strain: Not on file    about Rx  I discussed with patient that she might benefit from chronic suppressive Rx.   She will need postvoid bladder scan once quarantine is lifted  CBC BMP before ordering any further antibiotics    MERRY ANGEL MD

## 2020-04-23 ENCOUNTER — HOSPITAL ENCOUNTER (OUTPATIENT)
Age: 65
Setting detail: SPECIMEN
Discharge: HOME | End: 2020-04-23
Payer: MEDICARE

## 2020-04-23 LAB
BILIRUBIN URINE: NEGATIVE
BLOOD, URINE: NEGATIVE
CLARITY: CLEAR
COLOR: YELLOW
GLUCOSE URINE: NEGATIVE MG/DL
KETONES, URINE: NEGATIVE MG/DL
LEUKOCYTE ESTERASE, URINE: NEGATIVE
NITRITE, URINE: NEGATIVE
PH UA: 8 (ref 5–9)
PROTEIN UA: NEGATIVE MG/DL
SPECIFIC GRAVITY UA: 1 (ref 1–1.03)
URINE REFLEX TO CULTURE: NORMAL
UROBILINOGEN, URINE: 0.2 E.U./DL

## 2020-04-23 PROCEDURE — 81003 URINALYSIS AUTO W/O SCOPE: CPT

## 2020-04-27 ENCOUNTER — TELEPHONE (OUTPATIENT)
Dept: INFECTIOUS DISEASES | Age: 65
End: 2020-04-27

## 2020-04-27 RX ORDER — TRAMADOL HYDROCHLORIDE 50 MG/1
100 TABLET ORAL EVERY 8 HOURS PRN
Qty: 180 TABLET | Refills: 0 | Status: SHIPPED | OUTPATIENT
Start: 2020-04-27 | End: 2020-05-28 | Stop reason: SDUPTHER

## 2020-04-27 NOTE — TELEPHONE ENCOUNTER
pharmacy requesting medication refill. Please approve or deny this request.    Rx requested:  Requested Prescriptions     Pending Prescriptions Disp Refills    traMADol (ULTRAM) 50 MG tablet 180 tablet 0     Sig: Take 2 tablets by mouth every 8 hours as needed for Pain for up to 92 days.  TAKE 2 TABLETS BY MOUTH EVERY 8 HOURS AS NEEDED FOR PAIN         Last Office Visit:   11/26/2019      Next Visit Date:  Future Appointments   Date Time Provider Italo Pratheristi   5/8/2020  9:45 AM Lance Restrepo MD [address]

## 2020-04-28 RX ORDER — TRAMADOL HYDROCHLORIDE 50 MG/1
TABLET ORAL
Qty: 180 TABLET | Refills: 0 | OUTPATIENT
Start: 2020-04-28

## 2020-05-01 ENCOUNTER — TELEPHONE (OUTPATIENT)
Dept: INFECTIOUS DISEASES | Age: 65
End: 2020-05-01

## 2020-05-01 NOTE — TELEPHONE ENCOUNTER
Patient feels pressure and urgency, with frequency to urinate. Advised per last U/a completed on 4-24-20, no indication of Infection, Dr Vinicius Agee reviewed and said no Tx at this time. Patient is to have Labs drawn and F/u. Patient verbalized understanding, and will try to have Labs done prior to 5/8/20. But still wants to know if there is anything she should take. Currently on No Abx. Will update Dr Vinicius Agee.

## 2020-05-02 ENCOUNTER — HOSPITAL ENCOUNTER (OUTPATIENT)
Age: 65
Setting detail: SPECIMEN
Discharge: HOME | End: 2020-05-02
Payer: MEDICARE

## 2020-05-02 DIAGNOSIS — N30.00 ACUTE CYSTITIS WITHOUT HEMATURIA: ICD-10-CM

## 2020-05-02 PROCEDURE — 87077 CULTURE AEROBIC IDENTIFY: CPT

## 2020-05-02 PROCEDURE — 87086 URINE CULTURE/COLONY COUNT: CPT

## 2020-05-02 PROCEDURE — 87186 SC STD MICRODIL/AGAR DIL: CPT

## 2020-05-05 LAB
ORGANISM: ABNORMAL
URINE CULTURE, ROUTINE: ABNORMAL

## 2020-05-07 NOTE — PROGRESS NOTES
(URGENCY)  2    meloxicam (MOBIC) 15 MG tablet Take 1 tablet by mouth daily 30 tablet 3    umeclidinium-vilanterol (ANORO ELLIPTA) 62.5-25 MCG/INH AEPB inhaler Inhale 1 puff into the lungs daily 1 each 1    albuterol sulfate HFA (PROAIR HFA) 108 (90 Base) MCG/ACT inhaler Inhale 2 puffs into the lungs every 6 hours as needed for Wheezing or Shortness of Breath 1 Inhaler 3    vitamin D (CHOLECALCIFEROL) 1000 UNIT TABS tablet Take 1,000 Units by mouth daily.  calcium-vitamin D (OSCAL 500/200 D-3) 500-200 MG-UNIT per tablet Take 1 tablet by mouth 2 times daily.  therapeutic multivitamin-minerals (THERAGRAN-M) tablet Take 1 tablet by mouth daily. No current facility-administered medications on file prior to visit. Since we cannot conduct an in-person exam, the following were addressed with the patient. I have asked the patient to assist in their exam:  Patient denies any general new issues. Patient does not observe any new skin rashes or ulcers. Patient does not perceive any new visual deficits  Patient does not feel like they are \"clammy\" or sweating  Patient denies any dry mouth or sore mouth and is able to flex and extend her neck with ease. Patient can inhale and exhale without any difficulty and feels like their chest is expanding symmetrically. They do not feel short of breath or any distress when asked to move around. No pain with expansion of the chest  Patient is able to feel their own pulse and agrees it is regular. Patient states their abdomen is not protruberant beyond their normal size. States that there is no pain when touching the area beneath the sternum when directed, or the left or the right side of the abdomen. They feel no pain when asked to press on the suprapubic area or the right or left flank. Patient denies any pain when asked to squeeze both upper legs and lower legs anteriorly or posteriorly. They do not see any new swelling of their joints.   No observed means to self-quarantine in the event of mild symptoms.        Apple Swain D.O.

## 2020-05-08 ENCOUNTER — VIRTUAL VISIT (OUTPATIENT)
Dept: INFECTIOUS DISEASES | Age: 65
End: 2020-05-08
Payer: MEDICARE

## 2020-05-08 PROCEDURE — 99214 OFFICE O/P EST MOD 30 MIN: CPT | Performed by: INTERNAL MEDICINE

## 2020-05-08 RX ORDER — LINEZOLID 600 MG/1
600 TABLET, FILM COATED ORAL 2 TIMES DAILY
Qty: 10 TABLET | Refills: 0 | Status: SHIPPED | OUTPATIENT
Start: 2020-05-08 | End: 2020-05-13

## 2020-05-28 ENCOUNTER — TELEPHONE (OUTPATIENT)
Dept: FAMILY MEDICINE CLINIC | Age: 65
End: 2020-05-28

## 2020-05-28 RX ORDER — TRAMADOL HYDROCHLORIDE 50 MG/1
100 TABLET ORAL EVERY 8 HOURS PRN
Qty: 180 TABLET | Refills: 0 | Status: SHIPPED | OUTPATIENT
Start: 2020-05-28 | End: 2020-07-21 | Stop reason: SDUPTHER

## 2020-05-29 ENCOUNTER — TELEPHONE (OUTPATIENT)
Dept: INFECTIOUS DISEASES | Age: 65
End: 2020-05-29

## 2020-05-29 ENCOUNTER — VIRTUAL VISIT (OUTPATIENT)
Dept: INFECTIOUS DISEASES | Age: 65
End: 2020-05-29
Payer: MEDICARE

## 2020-05-29 PROCEDURE — 99213 OFFICE O/P EST LOW 20 MIN: CPT | Performed by: INTERNAL MEDICINE

## 2020-05-29 RX ORDER — SULFAMETHOXAZOLE AND TRIMETHOPRIM 800; 160 MG/1; MG/1
1 TABLET ORAL DAILY
Qty: 30 TABLET | Refills: 5 | Status: SHIPPED | OUTPATIENT
Start: 2020-05-29 | End: 2020-06-28

## 2020-05-29 NOTE — PROGRESS NOTES
Subjective:      Patient ID: Waleska Jaquez is a 59 y.o. female. HPI  Patient was informed that this is a billable visit. I am in my office, patient is at home. Doxy. me was used for communication and exam. Chart was reviewed and labs/ X Rays were discussed with the patient. our practice is making every effort to adhere to current recommendations, including social distancing. For the health and safety of our patients, and to prevent unnecessary exposure, we are currently scheduling telephone appointments. Patient was informed that these appointments are official appointments and will be billed through patient's insurance . Patient confirms this and agreed to the televised visit. Time spend in review of chart and on the IPAD using Doxy. me was 15  minutes. F/U recurrent UTI. Was treated with PO Zyvox few weeks ago. Has urgency and burning currently. Dr Lenord Collet prescribed Estradiol cream. No fevers. Dizzy. Chronic back pain from sciatica and bulging disc. Chronic constipation. No postvoid retention. Leaking of urine, mild, during the day. No nocturia  Review of Systems    Objective:   Physical Exam  HENT:      Head: Normocephalic. Nose: No congestion. Eyes:      General: No scleral icterus. Pulmonary:      Effort: Pulmonary effort is normal. No respiratory distress. Neurological:      Mental Status: She is alert and oriented to person, place, and time. Psychiatric:         Mood and Affect: Mood normal.         Behavior: Behavior normal.        RECEIVED :  05/02/20 14:25   Susceptibility     Enterococcus faecalis (1)     Antibiotic Interpretation ARI Status   ampicillin Sensitive <=2 mcg/mL    ciprofloxacin Resistant 4 mcg/mL    levofloxacin Intermediate 4 mcg/mL    nitrofurantoin Sensitive <=16 mcg/mL    vancomycin Sensitive 1 mcg/mL    Lab and Collection       Assessment:      Recurrent UTIs.  Chronic Bactrim suppressive Rx ordered by Dr Idania Gallardo worked well in the past and would like to try it  With persistent dysuria  H/O Kidney stones and neurogenic bladder  Perineal and perianal lesions with path + Bowen's Disease.       Plan:      U/A with reflex Cx  Bactrim DS 1 tab daily for chronic suppressive Rx  CBC BMP Q 3 months          Sulma Cha MD

## 2020-05-29 NOTE — TELEPHONE ENCOUNTER
Per Dr. Seymour Scales okay to take Bactrim with Uridel and Oxybution. Called patient spoke with her and reayed Dr's Order and She Verbalized Understanding.

## 2020-05-30 ENCOUNTER — HOSPITAL ENCOUNTER (OUTPATIENT)
Age: 65
Setting detail: SPECIMEN
Discharge: HOME | End: 2020-05-30
Payer: MEDICARE

## 2020-05-30 LAB
BACTERIA: NEGATIVE /HPF
BILIRUBIN URINE: NEGATIVE
BLOOD, URINE: NEGATIVE
CLARITY: CLEAR
COLOR: YELLOW
EPITHELIAL CELLS, UA: ABNORMAL /HPF (ref 0–5)
GLUCOSE URINE: NEGATIVE MG/DL
HYALINE CASTS: ABNORMAL /HPF (ref 0–5)
KETONES, URINE: NEGATIVE MG/DL
LEUKOCYTE ESTERASE, URINE: ABNORMAL
NITRITE, URINE: NEGATIVE
PH UA: 7 (ref 5–9)
PROTEIN UA: NEGATIVE MG/DL
RBC UA: ABNORMAL /HPF (ref 0–5)
SPECIFIC GRAVITY UA: 1.01 (ref 1–1.03)
URINE REFLEX TO CULTURE: YES
UROBILINOGEN, URINE: 0.2 E.U./DL
WBC UA: ABNORMAL /HPF (ref 0–5)
YEAST: PRESENT /HPF

## 2020-05-30 PROCEDURE — 87086 URINE CULTURE/COLONY COUNT: CPT

## 2020-05-30 PROCEDURE — 81001 URINALYSIS AUTO W/SCOPE: CPT

## 2020-06-02 ENCOUNTER — TELEPHONE (OUTPATIENT)
Dept: FAMILY MEDICINE CLINIC | Age: 65
End: 2020-06-02

## 2020-06-02 ENCOUNTER — TELEPHONE (OUTPATIENT)
Dept: INFECTIOUS DISEASES | Age: 65
End: 2020-06-02

## 2020-06-02 LAB — URINE CULTURE, ROUTINE: NORMAL

## 2020-06-02 RX ORDER — FLUCONAZOLE 150 MG/1
150 TABLET ORAL DAILY
Qty: 3 TABLET | Refills: 0 | Status: SHIPPED | OUTPATIENT
Start: 2020-06-02 | End: 2020-07-13 | Stop reason: SDUPTHER

## 2020-06-02 NOTE — TELEPHONE ENCOUNTER
Patient called about Ua results. Advised per Review by [address], Cx is Negative, Ua has shows Yeast. Patient to F/u.

## 2020-06-02 NOTE — RESULT ENCOUNTER NOTE
Please notify Earnest Skiff that urine culture does not show any bacterial growth but there is evidence of yeast infection. Please verify pharmacy and I can send Rx in.

## 2020-07-01 ENCOUNTER — HOSPITAL ENCOUNTER (OUTPATIENT)
Dept: WOMENS IMAGING | Age: 65
Discharge: HOME | End: 2020-07-03
Payer: MEDICARE

## 2020-07-01 PROCEDURE — 77067 SCR MAMMO BI INCL CAD: CPT

## 2020-07-06 ENCOUNTER — TELEPHONE (OUTPATIENT)
Dept: FAMILY MEDICINE CLINIC | Age: 65
End: 2020-07-06

## 2020-07-06 RX ORDER — ALBUTEROL SULFATE 90 UG/1
2 AEROSOL, METERED RESPIRATORY (INHALATION) EVERY 6 HOURS PRN
Qty: 1 INHALER | Refills: 1 | Status: SHIPPED | OUTPATIENT
Start: 2020-07-06 | End: 2020-12-01

## 2020-07-06 NOTE — TELEPHONE ENCOUNTER
----- Message from Kat Del Toro sent at 7/6/2020 12:44 PM EDT -----  Patient is aware. Patient is requesting pro-air inhaler, other inhaler not covered by insurance. [facility].

## 2020-07-11 ENCOUNTER — TELEPHONE (OUTPATIENT)
Dept: FAMILY MEDICINE CLINIC | Age: 65
End: 2020-07-11

## 2020-07-13 RX ORDER — FLUCONAZOLE 150 MG/1
150 TABLET ORAL DAILY
Qty: 3 TABLET | Refills: 0 | Status: SHIPPED | OUTPATIENT
Start: 2020-07-13 | End: 2020-07-16

## 2020-07-13 NOTE — TELEPHONE ENCOUNTER
I can send in an Rx for diflucan for yeast infection but if UTI symptoms are not improving, an appointment with urine testing is advised. I recommend [facility] today in office.

## 2020-07-13 NOTE — TELEPHONE ENCOUNTER
Patient aware, states she is not able to make [facility] today as she does not have transportation today, but can stop there tomorrow. She would like a script sent ov er to [facility], [place] for diflucan.

## 2020-07-14 RX ORDER — TRAMADOL HYDROCHLORIDE 50 MG/1
TABLET ORAL
Qty: 180 TABLET | Refills: 0 | OUTPATIENT
Start: 2020-07-14

## 2020-07-15 ENCOUNTER — TELEPHONE (OUTPATIENT)
Dept: FAMILY MEDICINE CLINIC | Age: 65
End: 2020-07-15

## 2020-07-15 NOTE — TELEPHONE ENCOUNTER
Patient called in to check on a tramadol refill that was denied would like clarification on why?  Please advise

## 2020-07-16 NOTE — TELEPHONE ENCOUNTER
appt is needed within 90 days of controlled substance refill. Please offer a virtual appointment for a brief visit and I can refill medication then. We can do a telephone visit if she does not have a smart phone.

## 2020-07-17 NOTE — TELEPHONE ENCOUNTER
Patient is available this afternoon. I am not able to double book so if Reema Alvarado is able to add her on the schedule?

## 2020-07-20 ENCOUNTER — TELEPHONE (OUTPATIENT)
Dept: INFECTIOUS DISEASES | Age: 65
End: 2020-07-20

## 2020-07-20 RX ORDER — PREGABALIN 75 MG/1
75 CAPSULE ORAL 3 TIMES DAILY
Qty: 270 CAPSULE | Refills: 0 | Status: CANCELLED | OUTPATIENT
Start: 2020-07-20 | End: 2020-10-18

## 2020-07-20 NOTE — TELEPHONE ENCOUNTER
Patient has C/o of lower back pain, R and L side. Has hx of bulging discs, but also, uti's. Unable to void both urine and bm. Patient unable to contact PCP. Patient concerned she may have a Kidney Infection? Will update Dr Maco Fontana. Found alva's last appt was 5-29-20 and Labs are Required Q 3 monhs. Pt confirms she is on Bactrim, once daily.

## 2020-07-20 NOTE — TELEPHONE ENCOUNTER
pt requesting medication refill. Please approve or deny this request.  Pt has an appt on thursday and is out of pain meds--says she is dizzy--pt had to cxl appt last week because her mom passed away----    Rx requested:  Requested Prescriptions     Pending Prescriptions Disp Refills    pregabalin (LYRICA) 75 MG capsule 270 capsule 0     Sig: Take 1 capsule by mouth 3 times daily for 90 days.          Last Office Visit:   11/26/2019      Next Visit Date:  Future Appointments   Date Time Provider Italo Garcia   8/14/2020  3:00 PM LINSEY Alex - YENIFER Eddy [facility]   8/28/2020 10:00 AM Lolis Ambriz MD [address]

## 2020-07-21 ENCOUNTER — HOSPITAL ENCOUNTER (OUTPATIENT)
Dept: LAB | Age: 65
Discharge: HOME | End: 2020-07-21
Payer: MEDICARE

## 2020-07-21 ENCOUNTER — VIRTUAL VISIT (OUTPATIENT)
Dept: FAMILY MEDICINE CLINIC | Age: 65
End: 2020-07-21
Payer: MEDICARE

## 2020-07-21 LAB
ANION GAP SERPL CALCULATED.3IONS-SCNC: 15 MEQ/L (ref 9–15)
BACTERIA: ABNORMAL /HPF
BUN BLDV-MCNC: 11 MG/DL (ref 8–23)
CALCIUM SERPL-MCNC: 10.2 MG/DL (ref 8.5–9.9)
CHLORIDE BLD-SCNC: 95 MEQ/L (ref 95–107)
CO2: 25 MEQ/L (ref 20–31)
CREAT SERPL-MCNC: 0.98 MG/DL (ref 0.5–0.9)
EPITHELIAL CELLS, UA: ABNORMAL /HPF (ref 0–5)
GFR AFRICAN AMERICAN: >60
GFR NON-AFRICAN AMERICAN: 57
GLUCOSE BLD-MCNC: 89 MG/DL (ref 70–99)
HCT VFR BLD CALC: 34.3 % (ref 37–47)
HEMOGLOBIN: 11.3 G/DL (ref 12–16)
HYALINE CASTS: ABNORMAL /HPF (ref 0–5)
MCH RBC QN AUTO: 30.2 PG (ref 27–31.3)
MCHC RBC AUTO-ENTMCNC: 33 % (ref 33–37)
MCV RBC AUTO: 91.6 FL (ref 82–100)
PDW BLD-RTO: 14.3 % (ref 11.5–14.5)
PLATELET # BLD: 287 K/UL (ref 130–400)
POTASSIUM SERPL-SCNC: 4 MEQ/L (ref 3.4–4.9)
RBC # BLD: 3.75 M/UL (ref 4.2–5.4)
RBC UA: ABNORMAL /HPF (ref 0–5)
SODIUM BLD-SCNC: 135 MEQ/L (ref 135–144)
WBC # BLD: 11 K/UL (ref 4.8–10.8)
WBC UA: ABNORMAL /HPF (ref 0–5)

## 2020-07-21 PROCEDURE — 36415 COLL VENOUS BLD VENIPUNCTURE: CPT

## 2020-07-21 PROCEDURE — 99442 PR PHYS/QHP TELEPHONE EVALUATION 11-20 MIN: CPT | Performed by: NURSE PRACTITIONER

## 2020-07-21 PROCEDURE — 80048 BASIC METABOLIC PNL TOTAL CA: CPT

## 2020-07-21 PROCEDURE — 85027 COMPLETE CBC AUTOMATED: CPT

## 2020-07-21 PROCEDURE — 87186 SC STD MICRODIL/AGAR DIL: CPT

## 2020-07-21 PROCEDURE — 87077 CULTURE AEROBIC IDENTIFY: CPT

## 2020-07-21 PROCEDURE — 81001 URINALYSIS AUTO W/SCOPE: CPT

## 2020-07-21 PROCEDURE — 87086 URINE CULTURE/COLONY COUNT: CPT

## 2020-07-21 RX ORDER — PREGABALIN 75 MG/1
75 CAPSULE ORAL 3 TIMES DAILY
Qty: 270 CAPSULE | Refills: 0 | Status: SHIPPED | OUTPATIENT
Start: 2020-07-21 | End: 2020-08-14 | Stop reason: SINTOL

## 2020-07-21 RX ORDER — TRAMADOL HYDROCHLORIDE 50 MG/1
100 TABLET ORAL EVERY 8 HOURS PRN
Qty: 180 TABLET | Refills: 0 | Status: SHIPPED | OUTPATIENT
Start: 2020-07-21 | End: 2020-09-17 | Stop reason: CLARIF

## 2020-07-21 NOTE — TELEPHONE ENCOUNTER
Per consult with Dr Claire Brandt in clinic, 7/20/20, Patient is to have Urine tested and Labs completed. Ua w/ Reflx, CBC,BMP. Patient has been advised as of today, 7/21/20. Patient states she will be going to [employer] [employer] today. Advised they should see and be able to pull the orders from her records, per Dr Claire Brandt. Any problems to have them call and the orders can be Faxed. Pt verbalized understanding.

## 2020-07-21 NOTE — PROGRESS NOTES
2020    TELEHEALTH EVALUATION -- Audio/Visual (During RYKSB-15 public health emergency)    Due to [place] 19 outbreak, patient's office visit was converted to a virtual visit. Patient was contacted and agreed to proceed with a virtual visit via telephone   The risks and benefits of converting to a virtual visit were discussed in light of the current infectious disease epidemic. Patient also understood that insurance coverage and co-pays are up to their individual insurance plans. Brit Allan is a 59 y.o. female being evaluated by a Virtual Visit (telephone visit) encounter to address concerns as mentioned above. A caregiver was present when appropriate. Due to this being a TeleHealth encounter (During TLC-28 public health emergency), evaluation of the following organ systems was limited: Vitals/Constitutional/EENT/Resp/CV/GI//MS/Neuro/Skin/Heme-Lymph-Imm. Pursuant to the emergency declaration under the [address] [place] [address], [address] [place] [address] authority and the [employer] Act, this Virtual Visit was conducted with patient's (and/or legal guardian's) consent, to reduce the patient's risk of exposure to COVID-19 and provide necessary medical care. The patient (and/or legal guardian) has also been advised to contact this office for worsening conditions or problems, and seek emergency medical treatment and/or call 911 if deemed necessary. Patient identification was verified at the start of the visit: Yes    Total time spent for this encounter: 18 minutes    Services were provided through a video synchronous discussion virtually to substitute for in-person clinic visit. Patient and provider were located at their individual homes. --LINSEY Alex CNP on 2020 at 5:18 PM    An electronic signature was used to authenticate this note.       HPI:    Brit Allan (:  1955) has requested an audio/video evaluation for the following concern(s):    She states that she continues to have issues with chronic pelvic pain for which she is seen multiple specialist for. Does get some relief of symptoms when taking Ultram and Lyrica in combination. No side effects with the medication is reported. She continues to follow routinely with urology. Has had recurrent UTIs. She states that right now she is in need of medication refill for the chronic pain. Pain can be very severe at times rated as an 8-10 out of 10 and the medication in combination can help bring her severity of symptoms down to more moderate level. She is also been having more issues with sciatica on the right side. Lyrica has been helping with the nerve related pain going down her right side. Prior to Visit Medications    Medication Sig Taking? Authorizing Provider   traMADol (ULTRAM) 50 MG tablet Take 2 tablets by mouth every 8 hours as needed for Pain for up to 92 days. TAKE 2 TABLETS BY MOUTH EVERY 8 HOURS AS NEEDED FOR PAIN Yes LINSEY Lawson CNP   pregabalin (LYRICA) 75 MG capsule Take 1 capsule by mouth 3 times daily for 90 days. Yes LINSEY Lawson CNP   albuterol sulfate  (90 Base) MCG/ACT inhaler Inhale 2 puffs into the lungs every 6 hours as needed for Wheezing  LINSEY Lawson CNP   meloxicam (MOBIC) 15 MG tablet Take 1 tablet by mouth once daily  LINSEY Lawson CNP   Masks (FACE MASK EARLOOP-STYLE) MISC 1 Device by Does not apply route once for 1 dose  LINSEY Lawson CNP   ciprofloxacin (CIPRO) 500 MG tablet TAKE 1 TABLET BY MOUTH TWICE DAILY for 7 days  LINSEY Lawson CNP   traMADol (ULTRAM) 50 MG tablet Take by mouth.   Historical Provider, MD   docusate sodium (COLACE) 100 MG capsule Take by mouth  Historical Provider, MD   Meth-Hyo-M Bl-Na Phos-Ph Sal (URIBEL) 118 MG CAPS TAKE 1 CAPSULE BY MOUTH 4 TIMES DAILY AS NEEDED FOR BLADDER PAIN (URGENCY)  Historical Provider, MD umeclidinium-vilanterol (ANORO ELLIPTA) 62.5-25 MCG/INH AEPB inhaler Inhale 1 puff into the lungs daily  Barb Cassette RinigelanaLINSEY - CNP   albuterol sulfate HFA (PROAIR HFA) 108 (90 Base) MCG/ACT inhaler Inhale 2 puffs into the lungs every 6 hours as needed for Wheezing or Shortness of Breath  Barb Cassette KailaLINSEY - CNP   vitamin D (CHOLECALCIFEROL) 1000 UNIT TABS tablet Take 1,000 Units by mouth daily. Historical Provider, MD   calcium-vitamin D (OSCAL 500/200 D-3) 500-200 MG-UNIT per tablet Take 1 tablet by mouth 2 times daily. Historical Provider, MD   therapeutic multivitamin-minerals (THERAGRAN-M) tablet Take 1 tablet by mouth daily. Historical Provider, MD       Social History     Tobacco Use    Smoking status: Current Every Day Smoker     Packs/day: 0.50     Years: 20.00     Pack years: 10.00     Types: Cigarettes    Smokeless tobacco: Never Used    Tobacco comment: relaxes me   Substance Use Topics    Alcohol use: Yes     Alcohol/week: 5.0 standard drinks     Types: 5 Cans of beer per week     Comment: social    Drug use: No        PHYSICAL EXAMINATION:  [ INSTRUCTIONS:  \"[x]\" Indicates a positive item  \"[]\" Indicates a negative item  -- DELETE ALL ITEMS NOT EXAMINED]  Telephone visit. [] OTHER:      Due to this being a TeleHealth encounter, evaluation of the following organ systems is limited: Vitals/Constitutional/EENT/Resp/CV/GI//MS/Neuro/Skin/Heme-Lymph-Imm. ASSESSMENT/PLAN:   Diagnosis Orders   1. Chronic pelvic pain in female  traMADol (ULTRAM) 50 MG tablet    pregabalin (LYRICA) 75 MG capsule   2. Chronic right-sided low back pain with right-sided sciatica  traMADol (ULTRAM) 50 MG tablet    pregabalin (LYRICA) 75 MG capsule         Return in about 3 months (around 10/21/2020). Refill of chronic medications given for pain symptoms. Medication has been effective in reducing severity of symptoms. Take least amount of medication for results.     Controlled Substance Monitoring:    Acute and Chronic Pain Monitoring:   RX Monitoring 7/21/2020   Attestation -   Acute Pain Prescriptions -   Periodic Controlled Substance Monitoring Possible medication side effects, risk of tolerance/dependence & alternative treatments discussed. ;No signs of potential drug abuse or diversion identified. ;Assessed functional status. Chronic Pain > 50 MEDD Obtained or confirmed \"Consent for Opioid Use\" on file. ;Re-evaluated the status of the patient's underlying condition causing pain. Chronic Pain > 80 MEDD -       Please note this report has been partially produced using speech recognition software and may cause contain errors related to that system including grammar, punctuation and spelling as well as words and phrases that may seem inappropriate. If there are questions or concerns please feel free to contact me to clarify. An  electronic signature was used to authenticate this note. --LINSEY Jane - CNP on 7/23/2020 at 5:18 PM        Pursuant to the emergency declaration under the [facility] waiver authority and the [employer] Act, this Virtual  Visit was conducted, with patient's consent, to reduce the patient's risk of exposure to COVID-19 and provide continuity of care for an established patient. Services were provided through a video synchronous discussion virtually to substitute for in-person clinic visit.

## 2020-07-22 LAB
BILIRUBIN URINE: NEGATIVE
BLOOD, URINE: NEGATIVE
CLARITY: CLEAR
COLOR: ABNORMAL
GLUCOSE URINE: NEGATIVE MG/DL
KETONES, URINE: NEGATIVE MG/DL
LEUKOCYTE ESTERASE, URINE: ABNORMAL
NITRITE, URINE: NEGATIVE
PH UA: 7 (ref 5–9)
PROTEIN UA: NEGATIVE MG/DL
SPECIFIC GRAVITY UA: 1.01 (ref 1–1.03)
URINE REFLEX TO CULTURE: YES
UROBILINOGEN, URINE: 0.2 E.U./DL

## 2020-07-23 ENCOUNTER — HOSPITAL ENCOUNTER (EMERGENCY)
Age: 65
Discharge: HOME | End: 2020-07-23
Attending: EMERGENCY MEDICINE
Payer: MEDICARE

## 2020-07-23 ENCOUNTER — APPOINTMENT (OUTPATIENT)
Dept: GENERAL RADIOLOGY | Age: 65
End: 2020-07-23
Payer: MEDICARE

## 2020-07-23 VITALS
WEIGHT: 150 LBS | BODY MASS INDEX: 24.99 KG/M2 | HEART RATE: 125 BPM | HEIGHT: 65 IN | RESPIRATION RATE: 18 BRPM | DIASTOLIC BLOOD PRESSURE: 100 MMHG | SYSTOLIC BLOOD PRESSURE: 141 MMHG | OXYGEN SATURATION: 94 % | TEMPERATURE: 99.3 F

## 2020-07-23 LAB
EKG ATRIAL RATE: 102 BPM
EKG P AXIS: 62 DEGREES
EKG P-R INTERVAL: 150 MS
EKG Q-T INTERVAL: 328 MS
EKG QRS DURATION: 82 MS
EKG QTC CALCULATION (BAZETT): 427 MS
EKG R AXIS: -43 DEGREES
EKG T AXIS: 76 DEGREES
EKG VENTRICULAR RATE: 102 BPM

## 2020-07-23 PROCEDURE — 73030 X-RAY EXAM OF SHOULDER: CPT

## 2020-07-23 PROCEDURE — 6360000002 HC RX W HCPCS: Performed by: EMERGENCY MEDICINE

## 2020-07-23 PROCEDURE — 6370000000 HC RX 637 (ALT 250 FOR IP): Performed by: EMERGENCY MEDICINE

## 2020-07-23 PROCEDURE — 93005 ELECTROCARDIOGRAM TRACING: CPT

## 2020-07-23 PROCEDURE — 96372 THER/PROPH/DIAG INJ SC/IM: CPT

## 2020-07-23 PROCEDURE — 99283 EMERGENCY DEPT VISIT LOW MDM: CPT

## 2020-07-23 RX ORDER — ONDANSETRON 4 MG/1
4 TABLET, ORALLY DISINTEGRATING ORAL ONCE
Status: COMPLETED | OUTPATIENT
Start: 2020-07-23 | End: 2020-07-23

## 2020-07-23 RX ORDER — OXYCODONE HYDROCHLORIDE AND ACETAMINOPHEN 5; 325 MG/1; MG/1
1 TABLET ORAL EVERY 6 HOURS PRN
Qty: 10 TABLET | Refills: 0 | Status: SHIPPED | OUTPATIENT
Start: 2020-07-23 | End: 2020-07-26 | Stop reason: SINTOL

## 2020-07-23 RX ORDER — NALOXONE HYDROCHLORIDE 4 MG/.1ML
1 SPRAY NASAL PRN
Qty: 1 EACH | Refills: 5 | Status: ON HOLD | OUTPATIENT
Start: 2020-07-23 | End: 2020-11-30 | Stop reason: HOSPADM

## 2020-07-23 RX ORDER — 0.9 % SODIUM CHLORIDE 0.9 %
1000 INTRAVENOUS SOLUTION INTRAVENOUS ONCE
Status: DISCONTINUED | OUTPATIENT
Start: 2020-07-23 | End: 2020-07-23

## 2020-07-23 RX ADMIN — HYDROMORPHONE HYDROCHLORIDE 1 MG: 1 INJECTION, SOLUTION INTRAMUSCULAR; INTRAVENOUS; SUBCUTANEOUS at 19:31

## 2020-07-23 RX ADMIN — ONDANSETRON 4 MG: 4 TABLET, ORALLY DISINTEGRATING ORAL at 19:31

## 2020-07-23 ASSESSMENT — PAIN DESCRIPTION - ONSET: ONSET: SUDDEN

## 2020-07-23 ASSESSMENT — PAIN SCALES - GENERAL
PAINLEVEL_OUTOF10: 9
PAINLEVEL_OUTOF10: 9

## 2020-07-23 ASSESSMENT — PAIN DESCRIPTION - ORIENTATION: ORIENTATION: RIGHT

## 2020-07-23 ASSESSMENT — PAIN DESCRIPTION - PROGRESSION: CLINICAL_PROGRESSION: NOT CHANGED

## 2020-07-23 ASSESSMENT — PAIN DESCRIPTION - LOCATION: LOCATION: SHOULDER

## 2020-07-23 ASSESSMENT — PAIN DESCRIPTION - FREQUENCY: FREQUENCY: CONTINUOUS

## 2020-07-23 ASSESSMENT — PAIN DESCRIPTION - DESCRIPTORS: DESCRIPTORS: SHARP

## 2020-07-23 ASSESSMENT — PAIN DESCRIPTION - PAIN TYPE: TYPE: ACUTE PAIN

## 2020-07-23 NOTE — ED PROVIDER NOTES
[facility] ED  eMERGENCY dEPARTMENT eNCOUnter      Pt Name: Raymundo Lawrence  MRN: [id number]  [place] 1955  Date of evaluation: 7/23/2020  Provider: Mikhail Peñaloza MD    CHIEF COMPLAINT       Chief Complaint   Patient presents with    Shoulder Pain     Right shoulder pain. Onset- fell at 2pm today while feeling dizzy.  Dizziness     Onset- 2 years. Pt reports it's due to Rx medications. HISTORY OF PRESENT ILLNESS   (Location/Symptom, Timing/Onset,Context/Setting, Quality, Duration, Modifying Factors, Severity)  Note limiting factors. Raymundo Lawrence is a 59 y.o. female who presents to the emergency department with complaint of right shoulder pain status post fall at home. Patient got up from bed felt, dizzy and fell on the floor landing on her right shoulder. Floor was carpeted. Was able to get up without problem. Denies head injury with the fall. Denies loss of consciousness. Takes several medications most of which makes her dizzy. Comorbid conditions include chronic pain with right hip pain, groin pain, rectal pain, low back pain with sciatica, lumbosacral spondylosis without myelopathy, ilioinguinal neuralgia of right side, chronic abdominal pain, lumbar fusion, irritable bowel syndrome with constipation, dysplasia of vulva, chronic pelvic pain, COPD. Right shoulder pain is 9 in a scale of 1-10 and worse with movement. Keeping arm close to the body helps. Denies weakness or numbness. Denies any other systemic symptoms. HPI    Nursing Notes were reviewed. REVIEW OF SYSTEMS    (2-9 systems for level 4, 10 or more for level 5)     Review of Systems   Constitutional: Negative. Negative for activity change, appetite change, chills, fatigue and fever. HENT: Negative for congestion, ear discharge, ear pain, hearing loss, rhinorrhea, sinus pressure and sore throat. Eyes: Negative for photophobia, pain and visual disturbance.    Respiratory: Negative for apnea, cough, shortness of breath and wheezing. Cardiovascular: Negative for chest pain, palpitations and leg swelling. Gastrointestinal: Negative for abdominal distention, abdominal pain, constipation, diarrhea, nausea and vomiting. Endocrine: Negative for cold intolerance, heat intolerance and polyuria. Genitourinary: Negative for dysuria, flank pain, frequency and urgency. Musculoskeletal: Positive for arthralgias, back pain and myalgias. Negative for gait problem and neck stiffness. Skin: Negative for color change, pallor and rash. Allergic/Immunologic: Negative for food allergies and immunocompromised state. Neurological: Negative for dizziness, tremors, syncope, weakness, light-headedness and headaches. Psychiatric/Behavioral: Negative for agitation, confusion and hallucinations. All other systems reviewed and are negative. Except as noted above the remainder of the review of systems was reviewed and negative. 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 Kowalski [place] Osteopenia     Sciatica     Venous insufficiency          SURGICAL HISTORY       Past Surgical History:   Procedure Laterality Date    COLONOSCOPY      CYSTOSCOPY N/A 2014    x2    FOREIGN BODY REMOVAL Right 04/21/15    PARTIAL, GROIN    HERNIA REPAIR      double hernia oct.  2 2013    HYSTERECTOMY  7/14/11    bso Emily Dover    KIDNEY STONE SURGERY      x2    OTHER SURGICAL HISTORY      laser surgery facundo dx    MA COLON CA SCRN NOT HI RSK IND N/A 4/19/2017    COLONOSCOPY performed by Amberly Robin MD at [address] N/A 10/19/2016    ANAL PROCTO SIGMOIDOSCOPY RIGID / excision perineal nodule performed by Selena Levi MD at [facility]    SKIN BIOPSY      TONSILLECTOMY           CURRENT MEDICATIONS       Discharge Medication List as of 7/23/2020  8:25 PM      CONTINUE these medications which have NOT CHANGED    Details   pregabalin (LYRICA) 75 MG capsule Take 1 capsule by mouth 3 times daily for 90 days. , Disp-270 capsule,R-0Normal      !! traMADol (ULTRAM) 50 MG tablet Take by mouth. Historical Med      !! traMADol (ULTRAM) 50 MG tablet Take 2 tablets by mouth every 8 hours as needed for Pain for up to 92 days. TAKE 2 TABLETS BY MOUTH EVERY 8 HOURS AS NEEDED FOR PAIN, Disp-180 tablet,R-0Normal      !! albuterol sulfate  (90 Base) MCG/ACT inhaler Inhale 2 puffs into the lungs every 6 hours as needed for Wheezing, Disp-1 Inhaler, R-1Normal      meloxicam (MOBIC) 15 MG tablet Take 1 tablet by mouth once daily, Disp-30 tablet, R-0Normal      Masks (FACE MASK EARLOOP-STYLE) MISC ONCE Starting Fri 4/3/2020, For 1 dose, Disp-1 each, R-0, Normal      ciprofloxacin (CIPRO) 500 MG tablet TAKE 1 TABLET BY MOUTH TWICE DAILY for 7 days, Disp-14 tablet, R-0Normal      docusate sodium (COLACE) 100 MG capsule Take by mouthHistorical Med      Meth-Hyo-M Bl-Na Phos-Ph Sal (URIBEL) 118 MG CAPS TAKE 1 CAPSULE BY MOUTH 4 TIMES DAILY AS NEEDED FOR BLADDER PAIN (URGENCY), R-2Historical Med      umeclidinium-vilanterol (ANORO ELLIPTA) 62.5-25 MCG/INH AEPB inhaler Inhale 1 puff into the lungs daily, Disp-1 each, R-1Normal      !! albuterol sulfate HFA (PROAIR HFA) 108 (90 Base) MCG/ACT inhaler Inhale 2 puffs into the lungs every 6 hours as needed for Wheezing or Shortness of Breath, Disp-1 Inhaler, R-3Normal      vitamin D (CHOLECALCIFEROL) 1000 UNIT TABS tablet Take 1,000 Units by mouth daily. calcium-vitamin D (OSCAL 500/200 D-3) 500-200 MG-UNIT per tablet Take 1 tablet by mouth 2 times daily. therapeutic multivitamin-minerals (THERAGRAN-M) tablet Take 1 tablet by mouth daily. !! - Potential duplicate medications found.  Please discuss with provider. ALLERGIES     Macrobid [nitrofurantoin monohydrate macrocrystals] and Pcn [penicillins]    FAMILY HISTORY       Family History   Problem Relation Age of Onset    Heart Disease Mother     Other Mother         dementia    Arthritis Father           SOCIAL HISTORY       Social History     Socioeconomic History    Marital status:      Spouse name: None    Number of children: None    Years of education: None    Highest education level: None   Occupational History    Occupation: [employer]   Social Needs    Financial resource strain: None    Food insecurity     Worry: None     Inability: None    Transportation needs     Medical: None     Non-medical: None   Tobacco Use    Smoking status: Current Every Day Smoker     Packs/day: 0.50     Years: 20.00     Pack years: 10.00     Types: Cigarettes    Smokeless tobacco: Never Used    Tobacco comment: relaxes me   Substance and Sexual Activity    Alcohol use:  Yes     Alcohol/week: 5.0 standard drinks     Types: 5 Cans of beer per week     Comment: social    Drug use: No    Sexual activity: Yes     Partners: Male   Lifestyle    Physical activity     Days per week: None     Minutes per session: None    Stress: None   Relationships    Social connections     Talks on phone: None     Gets together: None     Attends Religion service: None     Active member of club or organization: None     Attends meetings of clubs or organizations: None     Relationship status: None    Intimate partner violence     Fear of current or ex partner: None     Emotionally abused: None     Physically abused: None     Forced sexual activity: None   Other Topics Concern    None   Social History Narrative    ** Merged History Encounter **            SCREENINGS             PHYSICAL EXAM    (up to 7 for level 4, 8 or more for level 5)     ED Triage Vitals [07/23/20 1857]   BP Temp Temp Source Pulse Resp SpO2 Height Weight   (!) 141/100 99.3 °F (37.4 °C) Oral 125 18 94 % 5' 5\" (1.651 m) 150 lb (68 kg)       Physical Exam  Vitals signs and nursing note reviewed. Constitutional:       General: She is in acute distress. Appearance: Normal appearance. She is well-developed and normal weight. She is not ill-appearing, toxic-appearing or diaphoretic. HENT:      Head: Normocephalic and atraumatic. Nose: No congestion or rhinorrhea. Mouth/Throat:      Mouth: Mucous membranes are moist.      Pharynx: Oropharynx is clear. No oropharyngeal exudate or posterior oropharyngeal erythema. Eyes:      General: No scleral icterus. Right eye: No discharge. Left eye: No discharge. Conjunctiva/sclera: Conjunctivae normal.      Pupils: Pupils are equal, round, and reactive to light. Neck:      Musculoskeletal: Normal range of motion and neck supple. No neck rigidity or muscular tenderness. Thyroid: No thyromegaly. Vascular: No carotid bruit or JVD. Trachea: No tracheal deviation. Cardiovascular:      Rate and Rhythm: Normal rate and regular rhythm. Heart sounds: Normal heart sounds. No murmur. No friction rub. No gallop. Pulmonary:      Effort: Pulmonary effort is normal. No respiratory distress. Breath sounds: Normal breath sounds. No stridor. No wheezing, rhonchi or rales. Chest:      Chest wall: No tenderness. Abdominal:      General: Bowel sounds are normal. There is no distension. Palpations: Abdomen is soft. There is no mass. Tenderness: There is no abdominal tenderness. There is no right CVA tenderness, left CVA tenderness, guarding or rebound. Hernia: No hernia is present. Musculoskeletal: Normal range of motion. General: Swelling, tenderness, deformity and signs of injury present. Right lower leg: No edema. Left lower leg: No edema. Comments: Loss of right deltoid contour. Lymphadenopathy:      Cervical: No cervical adenopathy. Skin:     General: Skin is warm and dry. discharge on sling and swath and for patient to follow up with him in the office tomorrow. Care plan was discussed with patient and spouse and they are agreeable. Vitals:    Vitals:    07/23/20 1857   BP: (!) 141/100   Pulse: 125   Resp: 18   Temp: 99.3 °F (37.4 °C)   TempSrc: Oral   SpO2: 94%   Weight: 150 lb (68 kg)   Height: 5' 5\" (1.651 m)           MDM  Number of Diagnoses or Management Options     Amount and/or Complexity of Data Reviewed  Tests in the radiology section of CPT®: reviewed and ordered    Risk of Complications, Morbidity, and/or Mortality  Presenting problems: moderate  Diagnostic procedures: moderate  Management options: moderate    Patient Progress  Patient progress: improved      CRITICAL CARE TIME   Total Critical Care time was  minutes, excluding separately reportable procedures. There was a high probability of clinically significant/life threatening deterioration in the patient's condition which required my urgentintervention. CONSULTS:  None    PROCEDURES:  Unless otherwise noted below, none     Procedures    FINAL IMPRESSION      1. Humeral head fracture, right, closed, initial encounter    2. Accidental fall, initial encounter          DISPOSITION/PLAN   DISPOSITION Decision To Discharge 07/23/2020 08:23:39 PM      PATIENT REFERRED TO:  LINSEY Roque - CNP  [facility] [facility]  [phone]    In 3 days        DISCHARGE MEDICATIONS:  Discharge Medication List as of 7/23/2020  8:25 PM      START taking these medications    Details   oxyCODONE-acetaminophen (PERCOCET) 5-325 MG per tablet Take 1 tablet by mouth every 6 hours as needed for Pain for up to 3 days. WARNING:  May cause drowsiness. May impair ability to operate vehicles or machinery.   Do not use in combination with alcohol., Disp-10 tablet,R-0Print      naloxone 4 MG/0.1ML LIQD nasal spray 1 spray by Nasal route as needed for Opioid Reversal, Disp-1 each,R-5Print (Please note that portions of this note were completed with a voice recognitionprogram.  Efforts were made to edit the dictations but occasionally words are mis-transcribed.)    Kev Page MD (electronically signed)  Attending Emergency Physician          Kev Page MD  07/23/20 [id number] [place] MD [address]  07/23/20 2018       Kev Page MD  07/24/20 [id number]

## 2020-07-24 ENCOUNTER — OFFICE VISIT (OUTPATIENT)
Dept: ORTHOPEDIC SURGERY | Age: 65
End: 2020-07-24
Payer: MEDICARE

## 2020-07-24 ENCOUNTER — CARE COORDINATION (OUTPATIENT)
Dept: CARE COORDINATION | Age: 65
End: 2020-07-24

## 2020-07-24 VITALS
BODY MASS INDEX: 24.99 KG/M2 | WEIGHT: 150 LBS | HEART RATE: 120 BPM | OXYGEN SATURATION: 97 % | TEMPERATURE: 96.6 F | HEIGHT: 65 IN

## 2020-07-24 PROBLEM — S42.221A 2-PART DISP FX OF SURGICAL NECK OF RIGHT HUMERUS, INIT: Status: ACTIVE | Noted: 2020-07-24

## 2020-07-24 PROCEDURE — 99203 OFFICE O/P NEW LOW 30 MIN: CPT | Performed by: ORTHOPAEDIC SURGERY

## 2020-07-24 PROCEDURE — 93010 ELECTROCARDIOGRAM REPORT: CPT | Performed by: INTERNAL MEDICINE

## 2020-07-24 PROCEDURE — 23600 CLTX PROX HUMRL FX W/O MNPJ: CPT | Performed by: ORTHOPAEDIC SURGERY

## 2020-07-24 PROCEDURE — L3670 SO ACRO/CLAV CAN WEB PRE OTS: HCPCS | Performed by: ORTHOPAEDIC SURGERY

## 2020-07-24 RX ORDER — CIPROFLOXACIN 500 MG/1
500 TABLET, FILM COATED ORAL 2 TIMES DAILY
Qty: 20 TABLET | Refills: 0 | Status: ON HOLD | OUTPATIENT
Start: 2020-07-24 | End: 2020-07-28 | Stop reason: HOSPADM

## 2020-07-24 ASSESSMENT — ENCOUNTER SYMPTOMS
SORE THROAT: 0
NAUSEA: 0
COLOR CHANGE: 0
APNEA: 0
EYE PAIN: 0
COUGH: 0
DIARRHEA: 0
BACK PAIN: 1
ABDOMINAL PAIN: 0
SINUS PRESSURE: 0
BACK PAIN: 1
RHINORRHEA: 0
ABDOMINAL DISTENTION: 0
SHORTNESS OF BREATH: 0
CONSTIPATION: 0
PHOTOPHOBIA: 0
VOMITING: 0
WHEEZING: 0

## 2020-07-24 NOTE — CARE COORDINATION
Patient contacted regarding Aris Shrestha. Discussed COVID-19 related testing which was not done at this time. Test results were not done. Patient informed of results, if available? n/a    Care Transition Nurse/ Ambulatory Care Manager contacted the patient by telephone to perform post discharge assessment. Verified name and  with patient as identifiers. Provided introduction to self, and explanation of the CTN/ACM role, and reason for call due to risk factors for infection and/or exposure to COVID-19. Symptoms reviewed with patient who verbalized the following symptoms: no new symptoms and no worsening symptoms. Due to no new or worsening symptoms encounter was not routed to provider for escalation. Discussed follow-up appointments. If no appointment was previously scheduled, appointment scheduling offered: Yes  [facility] follow up appointment(s):   Future Appointments   Date Time Provider Italo Radha   2020  2:45 PM Gamal Sandoval MD [facility]   2020  3:00 PM LINSEY Lynn - CNP [facility]   2020 10:00 AM David Lipscomb MD [address]     [facility] follow up appointment(s):      Advance Care Planning:   Does patient have an Advance Directive:  reviewed and current. Patient has following risk factors of: COPD. CTN/ACM reviewed discharge instructions, medical action plan and red flags such as increased shortness of breath, increasing fever and signs of decompensation with patient who verbalized understanding. Discussed exposure protocols and quarantine with CDC Guidelines What to do if you are sick with coronavirus disease .  Patient was given an opportunity for questions and concerns. The patient agrees to contact the [facility] exposure line [phone], [facility] department [place] Department of Health: ([phone]) and PCP office for questions related to their healthcare.  CTN/ACM provided contact information for future needs.    Reviewed and educated patient on any new and changed medications related to discharge diagnosis     Patient/family/caregiver given information for GetWell Loop and agrees to enroll yes  Patient's preferred e-mail: [email or web address]   Patient's preferred phone number: [phone]  Based on Loop alert triggers, patient will be contacted by nurse care manager for worsening symptoms. Pt will be further monitored by COVID Loop Team based on severity of symptoms and risk factors.

## 2020-07-24 NOTE — TELEPHONE ENCOUNTER
Patient completed Labs and U/a. Cx result reviewed with Dr Sandra Mahoney, considering Allergies, labs, and Sensitivities, Order Cipro, 500 mg, po, for x10 days. Stop Bactrim wihile on Cipro,   Repeat U/a Reflux in one week. If Patient cannot tolerate Cipro, Update Dr Diamond Acosta. Call to patient, she states she has taken Cipro in the past. She states she broke a bone in shoulder area and will have a F/u with [place] this afternoon. Advised the Abx will be called into local Phx. And to stop the Bactrim while on Cipro.  Patient verbalized understanding

## 2020-07-24 NOTE — PROGRESS NOTES
Subjective:      Patient ID: Jaxon Jarvis is a 59 y.o. female who presents today for:  Chief Complaint   Patient presents with   [facility] ED Follow-up     ED f/u right shoulder injury, pt had a fell yesterday around 1pm; xrays taken 07/23; no previous injury to her shoulder/arm; rates her pain 9/10; takes prescribed percocet for her pain       HPI  Right-hand-dominant female presents with her ex- today. She fell injuring the right shoulder. She reports she got lightheaded when she leaned over. She went to the emergency department. She is referred today for evaluation and treatment of the right shoulder. She denies injury to other extremity. She rates her pain at a 9 out of 10. She has chronic low back pain and sciatica. She is scheduled to see Dr. Radha Olson on August 4. 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 Rolf Desouza Osteopenia     Sciatica     Venous insufficiency       Past Surgical History:   Procedure Laterality Date    COLONOSCOPY      CYSTOSCOPY N/A 2014    x2    FOREIGN BODY REMOVAL Right 04/21/15    PARTIAL, GROIN    HERNIA REPAIR      double hernia oct.  2 2013    HYSTERECTOMY  7/14/11    bso Towanda Gosselin    KIDNEY STONE SURGERY      x2    OTHER SURGICAL HISTORY      laser surgery facundo dx    OR COLON CA SCRN NOT HI RSK IND N/A 4/19/2017    COLONOSCOPY performed by Eveline Crawford MD at [address] N/A 10/19/2016    ANAL PROCTO SIGMOIDOSCOPY RIGID / excision perineal nodule performed by Sumaya Loomis MD at [address] [place] [address] History     Socioeconomic History    Marital status:      Spouse name: Not on file    Number of children: Not on file    Years of education: mouth 2 times daily for 10 days 20 tablet 0    oxyCODONE-acetaminophen (PERCOCET) 5-325 MG per tablet Take 1 tablet by mouth every 6 hours as needed for Pain for up to 3 days. WARNING:  May cause drowsiness. May impair ability to operate vehicles or machinery. Do not use in combination with alcohol. 10 tablet 0    naloxone 4 MG/0.1ML LIQD nasal spray 1 spray by Nasal route as needed for Opioid Reversal 1 each 5    traMADol (ULTRAM) 50 MG tablet Take 2 tablets by mouth every 8 hours as needed for Pain for up to 92 days. TAKE 2 TABLETS BY MOUTH EVERY 8 HOURS AS NEEDED FOR PAIN 180 tablet 0    pregabalin (LYRICA) 75 MG capsule Take 1 capsule by mouth 3 times daily for 90 days. 270 capsule 0    albuterol sulfate  (90 Base) MCG/ACT inhaler Inhale 2 puffs into the lungs every 6 hours as needed for Wheezing 1 Inhaler 1    meloxicam (MOBIC) 15 MG tablet Take 1 tablet by mouth once daily 30 tablet 0    ciprofloxacin (CIPRO) 500 MG tablet TAKE 1 TABLET BY MOUTH TWICE DAILY for 7 days 14 tablet 0    traMADol (ULTRAM) 50 MG tablet Take by mouth.  docusate sodium (COLACE) 100 MG capsule Take by mouth      Meth-Hyo-M Bl-Na Phos-Ph Sal (URIBEL) 118 MG CAPS TAKE 1 CAPSULE BY MOUTH 4 TIMES DAILY AS NEEDED FOR BLADDER PAIN (URGENCY)  2    umeclidinium-vilanterol (ANORO ELLIPTA) 62.5-25 MCG/INH AEPB inhaler Inhale 1 puff into the lungs daily 1 each 1    albuterol sulfate HFA (PROAIR HFA) 108 (90 Base) MCG/ACT inhaler Inhale 2 puffs into the lungs every 6 hours as needed for Wheezing or Shortness of Breath 1 Inhaler 3    vitamin D (CHOLECALCIFEROL) 1000 UNIT TABS tablet Take 1,000 Units by mouth daily.  calcium-vitamin D (OSCAL 500/200 D-3) 500-200 MG-UNIT per tablet Take 1 tablet by mouth 2 times daily.  therapeutic multivitamin-minerals (THERAGRAN-M) tablet Take 1 tablet by mouth daily.         Masks (FACE MASK EARLOOP-STYLE) MISC 1 Device by Does not apply route once for 1 dose 1 each 0     No current facility-administered medications on file prior to visit. Review of Systems   Constitutional: Negative for fever. Cardiovascular: Negative for leg swelling. Musculoskeletal: Positive for back pain (chronic). Negative for arthralgias, gait problem, joint swelling and neck pain. Skin: Negative for rash. Neurological: Negative for weakness and numbness. Objective:   Pulse 120   Temp 96.6 °F (35.9 °C) (Temporal)   Ht 5' 5\" (1.651 m)   Wt 150 lb (68 kg)   SpO2 97%   BMI 24.96 kg/m²     Ortho Exam  On physical examination she is in no acute distress. She is in a sling and swath. She has no clavicular crepitation on the right or the left. She has no pain over the acromioclavicular joints bilaterally. She has pain about the right shoulder. She has no pain in the elbow or wrist bilaterally. Her axillary patch is intact radial ulnar median motor and sensory distribution are intact. Radial pulse palpable and symmetric bilaterally. Radiographs and Laboratory Studies:     Diagnostic Imaging Studies:    I reviewed plain x-rays through the [facility] GEE system. This demonstrates a 2 part proximal humerus fracture with mild displacement. Laboratory Studies:   Lab Results   Component Value Date    WBC 11.0 (H) 07/21/2020    HGB 11.3 (L) 07/21/2020    HCT 34.3 (L) 07/21/2020    MCV 91.6 07/21/2020     07/21/2020     No results found for: SEDRATE  No results found for: CRP    Assessment:      Diagnosis Orders   1. 2-part disp fx of surgical neck of right humerus, init  So acro/clav can web pre ots          Plan:     I reviewed the natural history of this with her. We discussed nonoperative operative options. Being that she is on disability and her comorbid medical conditions we discussed the nonoperative treatment plan. We discussed potential risk of arthrofibrosis. She was fitted for a shoulder immobilizer as she was uncomfortable in the sling and swath.   We discussed skin card to avoid maceration in the axilla. She can perform hand elbow wrist range of motion. I will see her back in 2 weeks in 2 weeks she should have repeat AP Y views of the right shoulder prior to being seen. Orders Placed This Encounter   Procedures    So acro/clav can web pre ots     Patient was prescribed a Breg Essential Shoulder Immobilizer. The right shoulder will require stabilization / immobilization from this orthosis. The orthosis will assist in protecting the affected area, provide functional support and facilitate healing. The device was ordered and fit on 07/24/20. The patient was educated and fit by a healthcare professional with expert knowledge and specialization in brace application while under the direct supervision of the treating physician. Verbal and written instructions for the use of and application of this item were provided. They were instructed to contact the office immediately should the brace result in increased pain, decreased sensation, increased swelling or worsening of the condition. No orders of the defined types were placed in this encounter. Return in about 2 weeks (around 8/7/2020).       Candie Lund MD

## 2020-07-26 ENCOUNTER — HOSPITAL ENCOUNTER (OUTPATIENT)
Age: 65
Setting detail: OBSERVATION
Discharge: HOME | End: 2020-07-28
Attending: EMERGENCY MEDICINE | Admitting: INTERNAL MEDICINE
Payer: MEDICARE

## 2020-07-26 ENCOUNTER — APPOINTMENT (OUTPATIENT)
Dept: GENERAL RADIOLOGY | Age: 65
End: 2020-07-26
Payer: MEDICARE

## 2020-07-26 ENCOUNTER — APPOINTMENT (OUTPATIENT)
Dept: CT IMAGING | Age: 65
End: 2020-07-26
Payer: MEDICARE

## 2020-07-26 PROBLEM — I95.1 ORTHOSTATIC HYPOTENSION: Status: ACTIVE | Noted: 2020-07-26

## 2020-07-26 LAB
ALBUMIN SERPL-MCNC: 4.2 G/DL (ref 3.5–4.6)
ALP BLD-CCNC: 105 U/L (ref 40–130)
ALT SERPL-CCNC: 16 U/L (ref 0–33)
AMPHETAMINE SCREEN, URINE: ABNORMAL
ANION GAP SERPL CALCULATED.3IONS-SCNC: 17 MEQ/L (ref 9–15)
AST SERPL-CCNC: 15 U/L (ref 0–35)
BACTERIA: NEGATIVE /HPF
BARBITURATE SCREEN URINE: ABNORMAL
BASOPHILS ABSOLUTE: 0 K/UL (ref 0–0.2)
BASOPHILS RELATIVE PERCENT: 0.4 %
BENZODIAZEPINE SCREEN, URINE: ABNORMAL
BILIRUB SERPL-MCNC: 0.3 MG/DL (ref 0.2–0.7)
BILIRUBIN URINE: NEGATIVE
BLOOD, URINE: NEGATIVE
BUN BLDV-MCNC: 10 MG/DL (ref 8–23)
CALCIUM SERPL-MCNC: 9.1 MG/DL (ref 8.5–9.9)
CANNABINOID SCREEN URINE: ABNORMAL
CHLORIDE BLD-SCNC: 99 MEQ/L (ref 95–107)
CLARITY: CLEAR
CO2: 24 MEQ/L (ref 20–31)
COCAINE METABOLITE SCREEN URINE: ABNORMAL
COLOR: YELLOW
CREAT SERPL-MCNC: 0.61 MG/DL (ref 0.5–0.9)
EOSINOPHILS ABSOLUTE: 0.1 K/UL (ref 0–0.7)
EOSINOPHILS RELATIVE PERCENT: 1.1 %
EPITHELIAL CELLS, UA: NORMAL /HPF
ETHANOL PERCENT: NORMAL G/DL
ETHANOL: <10 MG/DL (ref 0–0.08)
GFR AFRICAN AMERICAN: >60
GFR NON-AFRICAN AMERICAN: >60
GLOBULIN: 3 G/DL (ref 2.3–3.5)
GLUCOSE BLD-MCNC: 122 MG/DL (ref 70–99)
GLUCOSE URINE: NEGATIVE MG/DL
HCT VFR BLD CALC: 33.1 % (ref 37–47)
HEMOGLOBIN: 11.2 G/DL (ref 12–16)
KETONES, URINE: NEGATIVE MG/DL
LEUKOCYTE ESTERASE, URINE: NORMAL
LYMPHOCYTES ABSOLUTE: 1.8 K/UL (ref 1–4.8)
LYMPHOCYTES RELATIVE PERCENT: 24.1 %
Lab: ABNORMAL
MAGNESIUM: 2.4 MG/DL (ref 1.7–2.4)
MCH RBC QN AUTO: 31 PG (ref 27–31.3)
MCHC RBC AUTO-ENTMCNC: 33.8 % (ref 33–37)
MCV RBC AUTO: 91.7 FL (ref 82–100)
METHADONE SCREEN, URINE: ABNORMAL
METHAMPHETAMINE, URINE: ABNORMAL
MONOCYTES ABSOLUTE: 0.8 K/UL (ref 0.2–0.8)
MONOCYTES RELATIVE PERCENT: 10.9 %
NEUTROPHILS ABSOLUTE: 4.8 K/UL (ref 1.4–6.5)
NEUTROPHILS RELATIVE PERCENT: 63.5 %
NITRITE, URINE: NEGATIVE
OPIATE SCREEN URINE: ABNORMAL
PDW BLD-RTO: 14 % (ref 11.5–14.5)
PH UA: 7.5 (ref 5–9)
PHENCYCLIDINE SCREEN URINE: ABNORMAL
PLATELET # BLD: 331 K/UL (ref 130–400)
POTASSIUM SERPL-SCNC: 3.5 MEQ/L (ref 3.4–4.9)
PROPOXYPHENE SCREEN, URINE: ABNORMAL
PROTEIN UA: NEGATIVE MG/DL
RBC # BLD: 3.61 M/UL (ref 4.2–5.4)
RBC UA: NORMAL /HPF (ref 0–2)
SODIUM BLD-SCNC: 140 MEQ/L (ref 135–144)
SPECIFIC GRAVITY UA: 1.01 (ref 1–1.03)
TOTAL PROTEIN: 7.2 G/DL (ref 6.3–8)
TRICYCLIC, URINE: POSITIVE
TROPONIN: <0.01 NG/ML (ref 0–0.01)
UR OXYCODONE RAPID SCREEN: ABNORMAL
UROBILINOGEN, URINE: 0.2 E.U./DL
WBC # BLD: 7.5 K/UL (ref 4.8–10.8)
WBC UA: NORMAL /HPF (ref 0–5)

## 2020-07-26 PROCEDURE — 84484 ASSAY OF TROPONIN QUANT: CPT

## 2020-07-26 PROCEDURE — 83735 ASSAY OF MAGNESIUM: CPT

## 2020-07-26 PROCEDURE — 70450 CT HEAD/BRAIN W/O DYE: CPT

## 2020-07-26 PROCEDURE — 81001 URINALYSIS AUTO W/SCOPE: CPT

## 2020-07-26 PROCEDURE — 85025 COMPLETE CBC W/AUTO DIFF WBC: CPT

## 2020-07-26 PROCEDURE — 96375 TX/PRO/DX INJ NEW DRUG ADDON: CPT

## 2020-07-26 PROCEDURE — G0378 HOSPITAL OBSERVATION PER HR: HCPCS

## 2020-07-26 PROCEDURE — 36415 COLL VENOUS BLD VENIPUNCTURE: CPT

## 2020-07-26 PROCEDURE — 96374 THER/PROPH/DIAG INJ IV PUSH: CPT

## 2020-07-26 PROCEDURE — 71045 X-RAY EXAM CHEST 1 VIEW: CPT

## 2020-07-26 PROCEDURE — 2580000003 HC RX 258: Performed by: EMERGENCY MEDICINE

## 2020-07-26 PROCEDURE — 80306 DRUG TEST PRSMV INSTRMNT: CPT

## 2020-07-26 PROCEDURE — 2580000003 HC RX 258: Performed by: INTERNAL MEDICINE

## 2020-07-26 PROCEDURE — 6370000000 HC RX 637 (ALT 250 FOR IP): Performed by: EMERGENCY MEDICINE

## 2020-07-26 PROCEDURE — 6360000002 HC RX W HCPCS: Performed by: EMERGENCY MEDICINE

## 2020-07-26 PROCEDURE — 99285 EMERGENCY DEPT VISIT HI MDM: CPT

## 2020-07-26 PROCEDURE — 93005 ELECTROCARDIOGRAM TRACING: CPT | Performed by: EMERGENCY MEDICINE

## 2020-07-26 PROCEDURE — 80053 COMPREHEN METABOLIC PANEL: CPT

## 2020-07-26 PROCEDURE — 6370000000 HC RX 637 (ALT 250 FOR IP): Performed by: INTERNAL MEDICINE

## 2020-07-26 PROCEDURE — G0480 DRUG TEST DEF 1-7 CLASSES: HCPCS

## 2020-07-26 RX ORDER — 0.9 % SODIUM CHLORIDE 0.9 %
1000 INTRAVENOUS SOLUTION INTRAVENOUS ONCE
Status: COMPLETED | OUTPATIENT
Start: 2020-07-26 | End: 2020-07-26

## 2020-07-26 RX ORDER — SODIUM CHLORIDE 0.9 % (FLUSH) 0.9 %
10 SYRINGE (ML) INJECTION EVERY 12 HOURS SCHEDULED
Status: DISCONTINUED | OUTPATIENT
Start: 2020-07-26 | End: 2020-07-28 | Stop reason: HOSPADM

## 2020-07-26 RX ORDER — POLYETHYLENE GLYCOL 3350 17 G/17G
17 POWDER, FOR SOLUTION ORAL DAILY PRN
Status: DISCONTINUED | OUTPATIENT
Start: 2020-07-26 | End: 2020-07-28 | Stop reason: HOSPADM

## 2020-07-26 RX ORDER — ACETAMINOPHEN 650 MG/1
650 SUPPOSITORY RECTAL EVERY 6 HOURS PRN
Status: DISCONTINUED | OUTPATIENT
Start: 2020-07-26 | End: 2020-07-28 | Stop reason: HOSPADM

## 2020-07-26 RX ORDER — ALBUTEROL SULFATE 2.5 MG/3ML
2.5 SOLUTION RESPIRATORY (INHALATION) EVERY 4 HOURS PRN
Status: DISCONTINUED | OUTPATIENT
Start: 2020-07-26 | End: 2020-07-28 | Stop reason: HOSPADM

## 2020-07-26 RX ORDER — PROMETHAZINE HYDROCHLORIDE 12.5 MG/1
12.5 TABLET ORAL EVERY 6 HOURS PRN
Status: DISCONTINUED | OUTPATIENT
Start: 2020-07-26 | End: 2020-07-28 | Stop reason: HOSPADM

## 2020-07-26 RX ORDER — VITAMIN B COMPLEX
1000 TABLET ORAL DAILY
Status: DISCONTINUED | OUTPATIENT
Start: 2020-07-27 | End: 2020-07-28 | Stop reason: HOSPADM

## 2020-07-26 RX ORDER — TRAMADOL HYDROCHLORIDE 50 MG/1
50 TABLET ORAL EVERY 6 HOURS PRN
Status: DISCONTINUED | OUTPATIENT
Start: 2020-07-26 | End: 2020-07-28 | Stop reason: HOSPADM

## 2020-07-26 RX ORDER — SODIUM CHLORIDE 9 MG/ML
INJECTION, SOLUTION INTRAVENOUS CONTINUOUS
Status: DISCONTINUED | OUTPATIENT
Start: 2020-07-26 | End: 2020-07-27

## 2020-07-26 RX ORDER — SODIUM CHLORIDE 0.9 % (FLUSH) 0.9 %
10 SYRINGE (ML) INJECTION PRN
Status: DISCONTINUED | OUTPATIENT
Start: 2020-07-26 | End: 2020-07-28 | Stop reason: HOSPADM

## 2020-07-26 RX ORDER — MECLIZINE HCL 12.5 MG/1
25 TABLET ORAL ONCE
Status: COMPLETED | OUTPATIENT
Start: 2020-07-26 | End: 2020-07-26

## 2020-07-26 RX ORDER — MELOXICAM 7.5 MG/1
7.5 TABLET ORAL DAILY
Status: DISCONTINUED | OUTPATIENT
Start: 2020-07-27 | End: 2020-07-28 | Stop reason: HOSPADM

## 2020-07-26 RX ORDER — FENTANYL CITRATE 50 UG/ML
25 INJECTION, SOLUTION INTRAMUSCULAR; INTRAVENOUS ONCE
Status: COMPLETED | OUTPATIENT
Start: 2020-07-26 | End: 2020-07-26

## 2020-07-26 RX ORDER — ONDANSETRON 2 MG/ML
4 INJECTION INTRAMUSCULAR; INTRAVENOUS EVERY 6 HOURS PRN
Status: DISCONTINUED | OUTPATIENT
Start: 2020-07-26 | End: 2020-07-28 | Stop reason: HOSPADM

## 2020-07-26 RX ORDER — ONDANSETRON 2 MG/ML
4 INJECTION INTRAMUSCULAR; INTRAVENOUS ONCE
Status: COMPLETED | OUTPATIENT
Start: 2020-07-26 | End: 2020-07-26

## 2020-07-26 RX ORDER — ACETAMINOPHEN 325 MG/1
650 TABLET ORAL EVERY 6 HOURS PRN
Status: DISCONTINUED | OUTPATIENT
Start: 2020-07-26 | End: 2020-07-28 | Stop reason: HOSPADM

## 2020-07-26 RX ORDER — PREGABALIN 75 MG/1
75 CAPSULE ORAL 3 TIMES DAILY
Status: DISCONTINUED | OUTPATIENT
Start: 2020-07-26 | End: 2020-07-28 | Stop reason: HOSPADM

## 2020-07-26 RX ADMIN — PREGABALIN 75 MG: 75 CAPSULE ORAL at 22:29

## 2020-07-26 RX ADMIN — ASPIRIN 325 MG: 325 TABLET, COATED ORAL at 22:29

## 2020-07-26 RX ADMIN — FENTANYL CITRATE 25 MCG: 0.05 INJECTION, SOLUTION INTRAMUSCULAR; INTRAVENOUS at 17:39

## 2020-07-26 RX ADMIN — Medication 10 ML: at 22:27

## 2020-07-26 RX ADMIN — SODIUM CHLORIDE 1000 ML: 9 INJECTION, SOLUTION INTRAVENOUS at 17:19

## 2020-07-26 RX ADMIN — ONDANSETRON 4 MG: 2 INJECTION INTRAMUSCULAR; INTRAVENOUS at 17:19

## 2020-07-26 RX ADMIN — SODIUM CHLORIDE: 9 INJECTION, SOLUTION INTRAVENOUS at 22:26

## 2020-07-26 RX ADMIN — OYSTER SHELL CALCIUM WITH VITAMIN D 1 TABLET: 500; 200 TABLET, FILM COATED ORAL at 22:29

## 2020-07-26 RX ADMIN — SODIUM CHLORIDE 1000 ML: 9 INJECTION, SOLUTION INTRAVENOUS at 18:11

## 2020-07-26 RX ADMIN — MECLIZINE 25 MG: 12.5 TABLET ORAL at 17:19

## 2020-07-26 ASSESSMENT — ENCOUNTER SYMPTOMS
SORE THROAT: 0
COUGH: 0
DIARRHEA: 0
BACK PAIN: 1
NAUSEA: 0
ABDOMINAL PAIN: 0
SHORTNESS OF BREATH: 0
VOMITING: 0

## 2020-07-26 ASSESSMENT — PAIN DESCRIPTION - ORIENTATION
ORIENTATION: RIGHT

## 2020-07-26 ASSESSMENT — PAIN DESCRIPTION - LOCATION
LOCATION: ARM;SHOULDER
LOCATION: LEG;HIP
LOCATION: ARM;SHOULDER

## 2020-07-26 ASSESSMENT — PAIN SCALES - GENERAL
PAINLEVEL_OUTOF10: 8
PAINLEVEL_OUTOF10: 9
PAINLEVEL_OUTOF10: 6

## 2020-07-26 ASSESSMENT — PAIN DESCRIPTION - ONSET
ONSET: ON-GOING

## 2020-07-26 ASSESSMENT — PAIN DESCRIPTION - DESCRIPTORS
DESCRIPTORS: ACHING

## 2020-07-26 ASSESSMENT — PAIN - FUNCTIONAL ASSESSMENT
PAIN_FUNCTIONAL_ASSESSMENT: PREVENTS OR INTERFERES SOME ACTIVE ACTIVITIES AND ADLS
PAIN_FUNCTIONAL_ASSESSMENT: PREVENTS OR INTERFERES SOME ACTIVE ACTIVITIES AND ADLS

## 2020-07-26 ASSESSMENT — PAIN DESCRIPTION - FREQUENCY
FREQUENCY: CONTINUOUS
FREQUENCY: CONTINUOUS

## 2020-07-26 ASSESSMENT — PAIN DESCRIPTION - PAIN TYPE
TYPE: ACUTE PAIN

## 2020-07-26 ASSESSMENT — PAIN DESCRIPTION - PROGRESSION
CLINICAL_PROGRESSION: NOT CHANGED
CLINICAL_PROGRESSION: NOT CHANGED

## 2020-07-26 NOTE — ED PROVIDER NOTES
[facility] ED  eMERGENCYdEPARTMENT eNCOUnter      Pt Name: Preston Galarza  MRN: [id number]  [place] 1955  Date of evaluation: 7/26/2020  Raj Thorpe MD    CHIEF COMPLAINT           HPI  Preston Galarza is a 59 y.o. female per chart review has a h/o CKD, COPD, IBS, sciatica, OA presents to the ED with syncope. Pt was walking to the bathroom and was noted to have a syncopal event. Pt was caught by her daughter. Pt with LOC for about 10 seconds. Pt notes moderate, constant, aching, R lower back pain x 3 years. Pt states it feels like her sciatica which is unchanged. Pt denies incontinence, saddle anesthesia, IV drug abuse. Pt also notes gradual onset, moderate, intermittent, dizziness that is worse with head mvmt x 2 weeks. Pt denies fever, n/v, cp, sob, dysuria, diarrhea. Pt seen in the ED 2 days ago for fall resulting in R shoulder fx. ROS  Review of Systems   Constitutional: Negative for activity change, chills and fever. HENT: Negative for ear pain and sore throat. Eyes: Negative for visual disturbance. Respiratory: Negative for cough and shortness of breath. Cardiovascular: Negative for chest pain, palpitations and leg swelling. Gastrointestinal: Negative for abdominal pain, diarrhea, nausea and vomiting. Genitourinary: Negative for dysuria. Musculoskeletal: Positive for back pain. R shoulder pain   Skin: Negative for rash. Neurological: Positive for dizziness. Negative for weakness. Except as noted above the remainder of the review of systems was reviewed and negative.        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 Kowalski Brothlauren Osteopenia     Sciatica     Venous insufficiency SURGICAL HISTORY       Past Surgical History:   Procedure Laterality Date    COLONOSCOPY      CYSTOSCOPY N/A 2014    x2    FOREIGN BODY REMOVAL Right 04/21/15    PARTIAL, GROIN    HERNIA REPAIR      double hernia oct. 2 2013    HYSTERECTOMY  7/14/11    bso Sandra Lesser    KIDNEY STONE SURGERY      x2    OTHER SURGICAL HISTORY      laser surgery facundo dx    OK COLON CA SCRN NOT HI RSK IND N/A 4/19/2017    COLONOSCOPY performed by Jeff Saenz MD at [address] N/A 10/19/2016    ANAL PROCTO SIGMOIDOSCOPY RIGID / excision perineal nodule performed by Kamini Vizcarra MD at [address]       Previous Medications    ALBUTEROL SULFATE HFA (PROAIR HFA) 108 (90 BASE) MCG/ACT INHALER    Inhale 2 puffs into the lungs every 6 hours as needed for Wheezing or Shortness of Breath    ALBUTEROL SULFATE  (90 BASE) MCG/ACT INHALER    Inhale 2 puffs into the lungs every 6 hours as needed for Wheezing    CALCIUM-VITAMIN D (OSCAL 500/200 D-3) 500-200 MG-UNIT PER TABLET    Take 1 tablet by mouth 2 times daily. CIPROFLOXACIN (CIPRO) 500 MG TABLET    Take 1 tablet by mouth 2 times daily for 10 days    MASKS (FACE MASK EARLOOP-STYLE) MISC    1 Device by Does not apply route once for 1 dose    MELOXICAM (MOBIC) 15 MG TABLET    Take 1 tablet by mouth once daily    METH-HYO-M BL-NA PHOS-PH SAL (URIBEL) 118 MG CAPS    TAKE 1 CAPSULE BY MOUTH 4 TIMES DAILY AS NEEDED FOR BLADDER PAIN (URGENCY)    NALOXONE 4 MG/0.1ML LIQD NASAL SPRAY    1 spray by Nasal route as needed for Opioid Reversal    PREGABALIN (LYRICA) 75 MG CAPSULE    Take 1 capsule by mouth 3 times daily for 90 days. THERAPEUTIC MULTIVITAMIN-MINERALS (THERAGRAN-M) TABLET    Take 1 tablet by mouth daily. TRAMADOL (ULTRAM) 50 MG TABLET    Take by mouth. TRAMADOL (ULTRAM) 50 MG TABLET    Take 2 tablets by mouth every 8 hours as needed for Pain for up to 92 days.  TAKE to go home. Pt given dizziness/syncope warning signs and will f/u with pcp. Pt understands plan. FINAL IMPRESSION      1. Dizziness    2.  Syncope and collapse          DISPOSITION/PLAN   DISPOSITION Decision To Discharge 07/26/2020 06:44:50 PM        DISCHARGE MEDICATIONS:  [unfilled]         Mykel Adams MD(electronically signed)  Attending Emergency Physician            Mykel Adams MD  07/26/20 2773

## 2020-07-26 NOTE — ED NOTES
Bed: 04  Expected date: 7/26/20  Expected time: 4:22 PM  Means of arrival:   Comments:  Dizzy. Hx Cipro for UTI, Rt. Hip Pain , Sciatica. 135 glucose, /81 97 BPM, 95% room air, # 22 IV with labs.      Desi Gunter RN  07/26/20 0897

## 2020-07-26 NOTE — ED TRIAGE NOTES
Pt from home via EMS for c/o + LOC and dizziness while walking to the bathroom. Pt fell and broke her right shoulder Thursday, was prescribed Percocet, stopped taking them r/t having hallucinations and falling. Pt fall was witnessed by daughter and Pt was eased to the floor, no injury, did not hit head, no trauma noted. Pt also diagnosed with UTI recently and is on Cipro. Pt states she has been having dizziness with movement for the past month or more. Pt has been taking Tramadol for pain, took a dose at 8am and again at 4pm today.

## 2020-07-27 ENCOUNTER — APPOINTMENT (OUTPATIENT)
Dept: ULTRASOUND IMAGING | Age: 65
End: 2020-07-27
Payer: MEDICARE

## 2020-07-27 LAB
ANION GAP SERPL CALCULATED.3IONS-SCNC: 10 MEQ/L (ref 9–15)
BASOPHILS ABSOLUTE: 0 K/UL (ref 0–0.2)
BASOPHILS RELATIVE PERCENT: 0.4 %
BUN BLDV-MCNC: 13 MG/DL (ref 8–23)
CALCIUM SERPL-MCNC: 8.2 MG/DL (ref 8.5–9.9)
CHLORIDE BLD-SCNC: 108 MEQ/L (ref 95–107)
CO2: 25 MEQ/L (ref 20–31)
CORTISOL - PM: 17.2 UG/DL (ref 2.3–11.9)
CREAT SERPL-MCNC: 0.55 MG/DL (ref 0.5–0.9)
EKG ATRIAL RATE: 103 BPM
EKG ATRIAL RATE: 78 BPM
EKG P AXIS: 59 DEGREES
EKG P AXIS: 84 DEGREES
EKG P-R INTERVAL: 148 MS
EKG P-R INTERVAL: 174 MS
EKG Q-T INTERVAL: 340 MS
EKG Q-T INTERVAL: 376 MS
EKG QRS DURATION: 78 MS
EKG QRS DURATION: 90 MS
EKG QTC CALCULATION (BAZETT): 428 MS
EKG QTC CALCULATION (BAZETT): 445 MS
EKG R AXIS: -34 DEGREES
EKG R AXIS: -40 DEGREES
EKG T AXIS: 34 DEGREES
EKG T AXIS: 45 DEGREES
EKG VENTRICULAR RATE: 103 BPM
EKG VENTRICULAR RATE: 78 BPM
EOSINOPHILS ABSOLUTE: 0.1 K/UL (ref 0–0.7)
EOSINOPHILS RELATIVE PERCENT: 2 %
GFR AFRICAN AMERICAN: >60
GFR NON-AFRICAN AMERICAN: >60
GLUCOSE BLD-MCNC: 138 MG/DL (ref 70–99)
HCT VFR BLD CALC: 26.4 % (ref 37–47)
HEMOGLOBIN: 8.8 G/DL (ref 12–16)
LV EF: 60 %
LVEF MODALITY: NORMAL
LYMPHOCYTES ABSOLUTE: 2.1 K/UL (ref 1–4.8)
LYMPHOCYTES RELATIVE PERCENT: 32.4 %
MAGNESIUM: 2.6 MG/DL (ref 1.7–2.4)
MCH RBC QN AUTO: 30.8 PG (ref 27–31.3)
MCHC RBC AUTO-ENTMCNC: 33.3 % (ref 33–37)
MCV RBC AUTO: 92.4 FL (ref 82–100)
MONOCYTES ABSOLUTE: 0.9 K/UL (ref 0.2–0.8)
MONOCYTES RELATIVE PERCENT: 13.5 %
NEUTROPHILS ABSOLUTE: 3.3 K/UL (ref 1.4–6.5)
NEUTROPHILS RELATIVE PERCENT: 51.7 %
PDW BLD-RTO: 13.9 % (ref 11.5–14.5)
PLATELET # BLD: 289 K/UL (ref 130–400)
POTASSIUM REFLEX MAGNESIUM: 3.4 MEQ/L (ref 3.4–4.9)
RBC # BLD: 2.86 M/UL (ref 4.2–5.4)
SODIUM BLD-SCNC: 143 MEQ/L (ref 135–144)
TSH SERPL DL<=0.05 MIU/L-ACNC: 1.35 UIU/ML (ref 0.44–3.86)
WBC # BLD: 6.4 K/UL (ref 4.8–10.8)

## 2020-07-27 PROCEDURE — 99204 OFFICE O/P NEW MOD 45 MIN: CPT | Performed by: INTERNAL MEDICINE

## 2020-07-27 PROCEDURE — 6370000000 HC RX 637 (ALT 250 FOR IP): Performed by: INTERNAL MEDICINE

## 2020-07-27 PROCEDURE — 2580000003 HC RX 258: Performed by: INTERNAL MEDICINE

## 2020-07-27 PROCEDURE — 93010 ELECTROCARDIOGRAM REPORT: CPT | Performed by: INTERNAL MEDICINE

## 2020-07-27 PROCEDURE — 85025 COMPLETE CBC W/AUTO DIFF WBC: CPT

## 2020-07-27 PROCEDURE — 82533 TOTAL CORTISOL: CPT

## 2020-07-27 PROCEDURE — 93005 ELECTROCARDIOGRAM TRACING: CPT

## 2020-07-27 PROCEDURE — 97162 PT EVAL MOD COMPLEX 30 MIN: CPT

## 2020-07-27 PROCEDURE — 83735 ASSAY OF MAGNESIUM: CPT

## 2020-07-27 PROCEDURE — 93306 TTE W/DOPPLER COMPLETE: CPT

## 2020-07-27 PROCEDURE — 80048 BASIC METABOLIC PNL TOTAL CA: CPT

## 2020-07-27 PROCEDURE — 84443 ASSAY THYROID STIM HORMONE: CPT

## 2020-07-27 PROCEDURE — 36415 COLL VENOUS BLD VENIPUNCTURE: CPT

## 2020-07-27 PROCEDURE — 6360000002 HC RX W HCPCS: Performed by: INTERNAL MEDICINE

## 2020-07-27 PROCEDURE — 96372 THER/PROPH/DIAG INJ SC/IM: CPT

## 2020-07-27 PROCEDURE — 94640 AIRWAY INHALATION TREATMENT: CPT

## 2020-07-27 PROCEDURE — 93880 EXTRACRANIAL BILAT STUDY: CPT

## 2020-07-27 PROCEDURE — G0378 HOSPITAL OBSERVATION PER HR: HCPCS

## 2020-07-27 PROCEDURE — 97116 GAIT TRAINING THERAPY: CPT

## 2020-07-27 RX ORDER — SODIUM CHLORIDE 9 MG/ML
INJECTION, SOLUTION INTRAVENOUS CONTINUOUS
Status: DISCONTINUED | OUTPATIENT
Start: 2020-07-27 | End: 2020-07-28 | Stop reason: HOSPADM

## 2020-07-27 RX ORDER — POTASSIUM CHLORIDE 750 MG/1
20 TABLET, EXTENDED RELEASE ORAL ONCE
Status: COMPLETED | OUTPATIENT
Start: 2020-07-27 | End: 2020-07-27

## 2020-07-27 RX ORDER — MIDODRINE HYDROCHLORIDE 2.5 MG/1
2.5 TABLET ORAL 2 TIMES DAILY WITH MEALS
Status: DISCONTINUED | OUTPATIENT
Start: 2020-07-27 | End: 2020-07-28 | Stop reason: HOSPADM

## 2020-07-27 RX ORDER — NICOTINE 21 MG/24HR
1 PATCH, TRANSDERMAL 24 HOURS TRANSDERMAL DAILY
Status: DISCONTINUED | OUTPATIENT
Start: 2020-07-27 | End: 2020-07-28 | Stop reason: HOSPADM

## 2020-07-27 RX ORDER — MECLIZINE HCL 12.5 MG/1
12.5 TABLET ORAL 3 TIMES DAILY
Status: DISCONTINUED | OUTPATIENT
Start: 2020-07-27 | End: 2020-07-27

## 2020-07-27 RX ADMIN — GLYCOPYRROLATE AND FORMOTEROL FUMARATE 2 PUFF: 9; 4.8 AEROSOL, METERED RESPIRATORY (INHALATION) at 18:17

## 2020-07-27 RX ADMIN — TRAMADOL HYDROCHLORIDE 50 MG: 50 TABLET, FILM COATED ORAL at 13:58

## 2020-07-27 RX ADMIN — OYSTER SHELL CALCIUM WITH VITAMIN D 1 TABLET: 500; 200 TABLET, FILM COATED ORAL at 08:43

## 2020-07-27 RX ADMIN — VITAMIN D, TAB 1000IU (100/BT) 1000 UNITS: 25 TAB at 08:43

## 2020-07-27 RX ADMIN — POTASSIUM CHLORIDE 20 MEQ: 750 TABLET, EXTENDED RELEASE ORAL at 12:41

## 2020-07-27 RX ADMIN — SODIUM CHLORIDE: 9 INJECTION, SOLUTION INTRAVENOUS at 15:33

## 2020-07-27 RX ADMIN — TRAMADOL HYDROCHLORIDE 50 MG: 50 TABLET, FILM COATED ORAL at 19:55

## 2020-07-27 RX ADMIN — GLYCOPYRROLATE AND FORMOTEROL FUMARATE 2 PUFF: 9; 4.8 AEROSOL, METERED RESPIRATORY (INHALATION) at 06:23

## 2020-07-27 RX ADMIN — PREGABALIN 75 MG: 75 CAPSULE ORAL at 19:55

## 2020-07-27 RX ADMIN — PREGABALIN 75 MG: 75 CAPSULE ORAL at 13:58

## 2020-07-27 RX ADMIN — MIDODRINE HYDROCHLORIDE 2.5 MG: 2.5 TABLET ORAL at 12:41

## 2020-07-27 RX ADMIN — ASPIRIN 325 MG: 325 TABLET, COATED ORAL at 08:43

## 2020-07-27 RX ADMIN — OYSTER SHELL CALCIUM WITH VITAMIN D 1 TABLET: 500; 200 TABLET, FILM COATED ORAL at 19:55

## 2020-07-27 RX ADMIN — MELOXICAM 7.5 MG: 7.5 TABLET ORAL at 08:42

## 2020-07-27 RX ADMIN — TRAMADOL HYDROCHLORIDE 50 MG: 50 TABLET, FILM COATED ORAL at 00:00

## 2020-07-27 RX ADMIN — ENOXAPARIN SODIUM 40 MG: 40 INJECTION SUBCUTANEOUS at 08:42

## 2020-07-27 RX ADMIN — ACETAMINOPHEN 650 MG: 325 TABLET ORAL at 08:43

## 2020-07-27 RX ADMIN — PREGABALIN 75 MG: 75 CAPSULE ORAL at 08:42

## 2020-07-27 RX ADMIN — MIDODRINE HYDROCHLORIDE 2.5 MG: 2.5 TABLET ORAL at 16:55

## 2020-07-27 RX ADMIN — POLYETHYLENE GLYCOL 3350 17 G: 17 POWDER, FOR SOLUTION ORAL at 08:50

## 2020-07-27 RX ADMIN — TRAMADOL HYDROCHLORIDE 50 MG: 50 TABLET, FILM COATED ORAL at 07:09

## 2020-07-27 ASSESSMENT — ENCOUNTER SYMPTOMS
APNEA: 0
ABDOMINAL PAIN: 0
BACK PAIN: 0
CHEST TIGHTNESS: 0
COLOR CHANGE: 0
CHOKING: 0
SHORTNESS OF BREATH: 0
ABDOMINAL DISTENTION: 0

## 2020-07-27 ASSESSMENT — PAIN SCALES - GENERAL
PAINLEVEL_OUTOF10: 9
PAINLEVEL_OUTOF10: 6
PAINLEVEL_OUTOF10: 8
PAINLEVEL_OUTOF10: 7
PAINLEVEL_OUTOF10: 6
PAINLEVEL_OUTOF10: 0
PAINLEVEL_OUTOF10: 9
PAINLEVEL_OUTOF10: 8
PAINLEVEL_OUTOF10: 8

## 2020-07-27 ASSESSMENT — PAIN DESCRIPTION - PAIN TYPE: TYPE: ACUTE PAIN

## 2020-07-27 ASSESSMENT — PAIN DESCRIPTION - ONSET: ONSET: ON-GOING

## 2020-07-27 ASSESSMENT — PAIN DESCRIPTION - FREQUENCY: FREQUENCY: CONTINUOUS

## 2020-07-27 ASSESSMENT — PAIN DESCRIPTION - LOCATION: LOCATION: BACK

## 2020-07-27 ASSESSMENT — PAIN DESCRIPTION - DESCRIPTORS
DESCRIPTORS: ACHING
DESCRIPTORS: BURNING

## 2020-07-27 ASSESSMENT — PAIN DESCRIPTION - ORIENTATION: ORIENTATION: RIGHT

## 2020-07-27 NOTE — PROGRESS NOTES
Physical Therapy    Facility/Department: [facility] MED SURG UNIT  Initial Assessment    NAME: Annie Godinez  : 1955  MRN: [id number]    Date of Service: 2020    Discharge Recommendations:  Continue to assess pending progress, Subacute/Skilled Nursing Facility, Home with Home health PT        Assessment   Body structures, Functions, Activity limitations: Decreased functional mobility ; Decreased balance;Decreased endurance;Decreased strength;Decreased safe awareness  Assessment: 59year old female adm following 2 falls at home within the last week due to dizziness (sustained R humeral head fracture following first fall last Thursday) currently requires assist for bed mobility, transfers, gait, also exhibits LE weakness, decreased balance and would benefit from short [facility] stay (7-10 days) to address impairments and allow safe D/C home. Treatment Diagnosis: weakness, decreased balance  Prognosis: Good  PT Education: Goals;PT Role;Plan of Care;General Safety  REQUIRES PT FOLLOW UP: Yes       Patient Diagnosis(es): The primary encounter diagnosis was Dizziness. Diagnoses of Syncope and collapse, Orthostasis, Frequent falls, and Polypharmacy were also pertinent to this visit. has a past medical history of Bowen's disease, Chronic back pain, Chronic kidney disease, COPD (chronic obstructive pulmonary disease) ([address] Irritable bowel syndrome with constipation, Lumbosacral spondylosis without myelopathy- Dr. Kirstin Limon, Osteopenia, Sciatica, and Venous insufficiency. has a past surgical history that includes Hysterectomy (11); other surgical history; hernia repair; Kidney stone surgery; Foreign Body Removal (Right, 04/21/15); Colonoscopy; Cystocopy (N/A, ); skin biopsy; Tonsillectomy; Sigmoidoscopy (N/A, 10/19/2016); and pr colon ca scrn not hi rsk ind (N/A, 2017).     Restrictions  Restrictions/Precautions  Restrictions/Precautions: Fall Risk(3 falls in the last month- dizziness)  Required Braces or falls    Goals  Short term goals  Time Frame for Short term goals: 3-4 days  Short term goal 1: Transfers SBA  Short term goal 2: Pt ambulate 100 feet with cane or without device SBA  Short term goal 3: Pt perform 20 reps LE exs to imrpove LE strength  Patient Goals   Patient goals :  To be more independent       Therapy Time   Individual Concurrent Group Co-treatment   Time In  1110         Time Out  1200         Minutes  [address], [place], [place]

## 2020-07-27 NOTE — H&P
Hospital Medicine History & Physical      PCP: LINSEY Lynn CNP    Date of Admission: 7/26/2020    Date of Service: 7/27/20      Chief Complaint:  Dizziness/multiple falls       History Of Present Illness:  59 y.o. female who presented to [facility] with above complains. For the last month noted dizziness/lightheadness upon getting up from bed/chair, lasted several seconds, relieved by sitting on lying down. Had several falls at home- last one yesterday was witnessed by her daughter. Had LOC for several seconds. Also had another wall week ago and result- had R shoulder fracture. Denied fever, dyspnea, muscular weakness. After initial stabilization in ER was admitted for further evaluation and treatment. 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 Rolf Desouza Osteopenia     Sciatica     Venous insufficiency        Past Surgical History:          Procedure Laterality Date    COLONOSCOPY      CYSTOSCOPY N/A 2014    x2    FOREIGN BODY REMOVAL Right 04/21/15    PARTIAL, GROIN    HERNIA REPAIR      double hernia oct. 2 2013    HYSTERECTOMY  7/14/11    bso Towanda Gosselin    KIDNEY STONE SURGERY      x2    OTHER SURGICAL HISTORY      laser surgery facundo dx    CO COLON CA SCRN NOT HI RSK IND N/A 4/19/2017    COLONOSCOPY performed by Eveline Crawford MD at [address] N/A 10/19/2016    ANAL PROCTO SIGMOIDOSCOPY RIGID / excision perineal nodule performed by Sumaya Loomis MD at [address] [place] [address]         Medications Prior to Admission:      Prior to Admission medications    Medication Sig Start Date End Date Taking?  Authorizing Provider   ciprofloxacin (CIPRO) 500 MG tablet Take 1 tablet by mouth 2 times daily for 10 days 7/24/20 8/3/20 Yes Azra Sloan MD   traMADol (ULTRAM) 50 MG tablet Take 2 tablets by mouth every 8 hours as needed for Pain for up to 92 days. TAKE 2 TABLETS BY MOUTH EVERY 8 HOURS AS NEEDED FOR PAIN 7/21/20 10/21/20 Yes LINSEY Conn CNP   pregabalin (LYRICA) 75 MG capsule Take 1 capsule by mouth 3 times daily for 90 days. 7/21/20 10/19/20 Yes LINSEY Conn CNP   albuterol sulfate  (90 Base) MCG/ACT inhaler Inhale 2 puffs into the lungs every 6 hours as needed for Wheezing 7/6/20  Yes LINSEY Neal CNP   meloxicam (MOBIC) 15 MG tablet Take 1 tablet by mouth once daily 5/21/20  Yes LINSEY Neal CNP   traMADol (ULTRAM) 50 MG tablet Take by mouth. 12/14/15  Yes Historical Provider, MD   Meth-HyoTuckerM Price Seed Phos-Ph Sal (URIBEL) 118 MG CAPS TAKE 1 CAPSULE BY MOUTH 4 TIMES DAILY AS NEEDED FOR BLADDER PAIN (URGENCY) 11/9/19  Yes Historical Provider, MD   albuterol sulfate HFA (PROAIR HFA) 108 (90 Base) MCG/ACT inhaler Inhale 2 puffs into the lungs every 6 hours as needed for Wheezing or Shortness of Breath 4/30/19  Yes LINSEY Neal CNP   vitamin D (CHOLECALCIFEROL) 1000 UNIT TABS tablet Take 1,000 Units by mouth daily. Yes Historical Provider, MD   calcium-vitamin D (OSCAL 500/200 D-3) 500-200 MG-UNIT per tablet Take 1 tablet by mouth 2 times daily. Yes Historical Provider, MD   therapeutic multivitamin-minerals (THERAGRAN-M) tablet Take 1 tablet by mouth daily.      Yes Historical Provider, MD   naloxone 4 MG/0.1ML LIQD nasal spray 1 spray by Nasal route as needed for Opioid Reversal 7/23/20   Radha Joel MD   Masks (FACE MASK EARLOOP-STYLE) MISC 1 Device by Does not apply route once for 1 dose 4/3/20 4/3/20  LINSEY Conn - YENIFER   umeclidinium-vilanterol (ANORO ELLIPTA) 62.5-25 MCG/INH AEPB inhaler Inhale 1 puff into the lungs daily 7/30/19   LINSEY Neal - CNP       Allergies:  Macrobid [nitrofurantoin monohydrate macrocrystals]; Nitrofurantoin; and Pcn [penicillins]    Social History:      The patient currently lives home    TOBACCO:   reports that she has been smoking cigarettes. She has a 10.00 pack-year smoking history. She has never used smokeless tobacco.  ETOH:   reports current alcohol use of about 5.0 standard drinks of alcohol per week. Family History:       Reviewed in detail and negative for DM, CAD, Cancer, CVA. Positive as follows:        Problem Relation Age of Onset    Heart Disease Mother    Janet Langton Other Mother         dementia    Arthritis Father        REVIEW OF SYSTEMS:   Pertinent positives as noted in the HPI. All other systems reviewed and negative. PHYSICAL EXAM:    BP (!) 110/59   Pulse 85   Temp 98.4 °F (36.9 °C) (Oral)   Resp 16   Ht 5' 5\" (1.651 m)   Wt 144 lb 6.4 oz (65.5 kg)   SpO2 97%   BMI 24.03 kg/m²     General appearance:  No apparent distress, appears stated age and cooperative. HEENT:  Normal cephalic, atraumatic without obvious deformity. Pupils equal, round, and reactive to light. Extra ocular muscles intact. Conjunctivae/corneas clear. Neck: Supple, with full range of motion. No jugular venous distention. Trachea midline. Respiratory:  Normal respiratory effort. Clear to auscultation, bilaterally without Rales/Wheezes/Rhonchi. Cardiovascular:  Regular rate and rhythm with normal S1/S2 without murmurs, rubs or gallops. Abdomen: Soft, non-tender, non-distended with normal bowel sounds. Musculoskeletal:  No clubbing, cyanosis or edema bilaterally. Full range of motion without deformity. Skin: Skin color, texture, turgor normal.  No rashes or lesions. Neurologic:  Neurovascularly intact without any focal sensory/motor deficits.  Cranial nerves: II-XII intact, grossly non-focal.  Psychiatric:  Alert and oriented, thought content appropriate, normal insight  Capillary Refill: Brisk,< 3 seconds   Peripheral Pulses: +2 palpable, equal bilaterally       Labs:     Recent Labs 07/26/20  1646 07/27/20  0453   WBC 7.5 6.4   HGB 11.2* 8.8*   HCT 33.1* 26.4*    289     Recent Labs     07/26/20  1646 07/27/20  0455    143   K 3.5 3.4   CL 99 108*   CO2 24 25   BUN 10 13   CREATININE 0.61 0.55   CALCIUM 9.1 8.2*     Recent Labs     07/26/20  1646   AST 15   ALT 16   BILITOT 0.3   ALKPHOS 105     No results for input(s): INR in the last 72 hours. Recent Labs     07/26/20  1646   TROPONINI <0.010       Urinalysis:      Lab Results   Component Value Date    NITRU Negative 07/26/2020    WBCUA 3-5 07/26/2020    BACTERIA Negative 07/26/2020    RBCUA None seen 07/26/2020    BLOODU Negative 07/26/2020    SPECGRAV 1.015 07/26/2020    GLUCOSEU Negative 07/26/2020           XR CHEST PORTABLE   Final Result   Impression:     1. Small amount of left pleural effusion. 2. Vascular calcifications thoracic aorta. 3. Please note this study does not exclude the possibility of underlying CoVid-19 viral infection and/or underlying pulmonary involvement         CT Head WO Contrast   Final Result      NO ACUTE INTRA-AXIAL OR EXTRA-AXIAL FINDINGS. All CT scans at this facility use dose modulation, iterative reconstruction, and/or weight based dosing when appropriate to reduce radiation dose to as low as reasonably achievable. US CAROTID ARTERY BILATERAL    (Results Pending)       ASSESSMENT:    Active Hospital Problems    Diagnosis Date Noted    Orthostatic hypotension [I95.1] 07/26/2020       PLAN:        DVT Prophylaxis:   Diet: DIET GENERAL;  Code Status: Full Code    PT/OT Eval Status: pending    Dispo - Fall/dizziness due to severe orthostatic hypotension- work up in process, continue with IV hydration, add midodrine, neurology on case  R shoulder fracture after fall- was evaluated by ortho service- recommended continue with non surgical approach.  Pain controlled  Smoking- advice to stop, accepted nicotinic patch  Will follow along with consultants for further recommendations Bandar Arauz MD    Thank you LINSEY Loaiza CNP for the opportunity to be involved in this patient's care. If you have any questions or concerns please feel free to contact me.

## 2020-07-27 NOTE — ED NOTES
Dr Deloras Dakins perfect served for possible admission. Gregoria Servin.  Maki Arrant  07/26/20 2050

## 2020-07-27 NOTE — ED NOTES
Dr. Valeria Acevedo accepts pt under observation, Supervisor Shavon priest.       Michelle Ayers, RN  07/26/20 2059

## 2020-07-28 ENCOUNTER — TELEPHONE (OUTPATIENT)
Dept: ADMINISTRATIVE | Age: 65
End: 2020-07-28

## 2020-07-28 VITALS
RESPIRATION RATE: 16 BRPM | TEMPERATURE: 98.4 F | OXYGEN SATURATION: 99 % | BODY MASS INDEX: 24.06 KG/M2 | WEIGHT: 144.4 LBS | HEIGHT: 65 IN | SYSTOLIC BLOOD PRESSURE: 120 MMHG | HEART RATE: 95 BPM | DIASTOLIC BLOOD PRESSURE: 76 MMHG

## 2020-07-28 LAB — CORTISOL - AM: 10.8 UG/DL (ref 6.2–19.4)

## 2020-07-28 PROCEDURE — 6370000000 HC RX 637 (ALT 250 FOR IP): Performed by: INTERNAL MEDICINE

## 2020-07-28 PROCEDURE — G0378 HOSPITAL OBSERVATION PER HR: HCPCS

## 2020-07-28 PROCEDURE — 6360000002 HC RX W HCPCS: Performed by: INTERNAL MEDICINE

## 2020-07-28 PROCEDURE — 96372 THER/PROPH/DIAG INJ SC/IM: CPT

## 2020-07-28 PROCEDURE — 2580000003 HC RX 258: Performed by: INTERNAL MEDICINE

## 2020-07-28 PROCEDURE — 97166 OT EVAL MOD COMPLEX 45 MIN: CPT

## 2020-07-28 PROCEDURE — 36415 COLL VENOUS BLD VENIPUNCTURE: CPT

## 2020-07-28 PROCEDURE — 97535 SELF CARE MNGMENT TRAINING: CPT

## 2020-07-28 PROCEDURE — 82533 TOTAL CORTISOL: CPT

## 2020-07-28 RX ORDER — MIDODRINE HYDROCHLORIDE 2.5 MG/1
2.5 TABLET ORAL 2 TIMES DAILY WITH MEALS
Qty: 90 TABLET | Refills: 3 | Status: SHIPPED | OUTPATIENT
Start: 2020-07-28

## 2020-07-28 RX ADMIN — MIDODRINE HYDROCHLORIDE 2.5 MG: 2.5 TABLET ORAL at 08:45

## 2020-07-28 RX ADMIN — VITAMIN D, TAB 1000IU (100/BT) 1000 UNITS: 25 TAB at 09:37

## 2020-07-28 RX ADMIN — TRAMADOL HYDROCHLORIDE 50 MG: 50 TABLET, FILM COATED ORAL at 04:35

## 2020-07-28 RX ADMIN — PREGABALIN 75 MG: 75 CAPSULE ORAL at 09:36

## 2020-07-28 RX ADMIN — ASPIRIN 325 MG: 325 TABLET, COATED ORAL at 09:37

## 2020-07-28 RX ADMIN — ACETAMINOPHEN 650 MG: 325 TABLET ORAL at 09:34

## 2020-07-28 RX ADMIN — TRAMADOL HYDROCHLORIDE 50 MG: 50 TABLET, FILM COATED ORAL at 10:44

## 2020-07-28 RX ADMIN — Medication 10 ML: at 09:38

## 2020-07-28 RX ADMIN — ENOXAPARIN SODIUM 40 MG: 40 INJECTION SUBCUTANEOUS at 09:36

## 2020-07-28 RX ADMIN — SODIUM CHLORIDE: 9 INJECTION, SOLUTION INTRAVENOUS at 00:36

## 2020-07-28 RX ADMIN — MELOXICAM 7.5 MG: 7.5 TABLET ORAL at 09:37

## 2020-07-28 RX ADMIN — PREGABALIN 75 MG: 75 CAPSULE ORAL at 14:52

## 2020-07-28 RX ADMIN — OYSTER SHELL CALCIUM WITH VITAMIN D 1 TABLET: 500; 200 TABLET, FILM COATED ORAL at 09:37

## 2020-07-28 RX ADMIN — ACETAMINOPHEN 650 MG: 325 TABLET ORAL at 00:33

## 2020-07-28 RX ADMIN — GLYCOPYRROLATE AND FORMOTEROL FUMARATE 2 PUFF: 9; 4.8 AEROSOL, METERED RESPIRATORY (INHALATION) at 06:07

## 2020-07-28 ASSESSMENT — PAIN SCALES - GENERAL
PAINLEVEL_OUTOF10: 7
PAINLEVEL_OUTOF10: 10
PAINLEVEL_OUTOF10: 3
PAINLEVEL_OUTOF10: 9
PAINLEVEL_OUTOF10: 9

## 2020-07-28 ASSESSMENT — PAIN DESCRIPTION - PAIN TYPE
TYPE_2: ACUTE PAIN
TYPE: CHRONIC PAIN

## 2020-07-28 ASSESSMENT — PAIN DESCRIPTION - ORIENTATION
ORIENTATION_2: RIGHT
ORIENTATION: RIGHT

## 2020-07-28 ASSESSMENT — PAIN DESCRIPTION - LOCATION
LOCATION: HIP;LEG
LOCATION_2: ARM

## 2020-07-28 ASSESSMENT — PAIN DESCRIPTION - INTENSITY: RATING_2: 5

## 2020-07-28 NOTE — PROGRESS NOTES
Spoke to pt's daughter, Norberto Parry, after permission from pt. Updated on care and plan for discharge. Provided emotional support and answered questions. Home care needs consulted with, Cathie , to follow up with home care needs.

## 2020-07-28 NOTE — PROGRESS NOTES
AVS explained and questions answered. IV removed prior to discharge. All belongings gathered in front of pt and pt stated all belongings were gathered. Pt left unit with via wheelchair with staff.

## 2020-07-28 NOTE — TELEPHONE ENCOUNTER
Archana from [facility] called to set up a hospital f/u for this patient. She is being d/c today for orthostatic hypotension and needs f/u within 1 week. Doctor on unit is also asking for outpatient psych consult. I could not find an appointment in your schedule and Charly Martinez in the office told me to send you this message and you would decide what to do with this patient. Please return call Archana at [address] [place] [address]  to schedule patient with you.

## 2020-07-28 NOTE — CONSULTS
ID - The patient is a 59y year old, right-handed female. Consultation requested by Dr. Matthew Gilman. The history was obtained from  the patient and available records. Progress notes, x-rays, lab results, and other inpatient notes were reviewed. CC -- Falls, dizziness -- HPI -- Lightheadedness and dizziness upon standing from a chair, resulting in several falls and loss  of consciousness. One fall resulted in a proximal humerus fracture. Orthostatic BP/P have been positive per nursing. Midodrine  has been added. IMAGING: CT brain rev'd - mod atrophy, . PMH -- hypotension, venous insufficiency, chronic obstructive pulmonary disease, irritable bowel synd, raenal failure, osteoarthritis,  osteopoenia, back pain, lumbar spondylosis, \"sciatica\", ilioinguinal neuralgia R  PSH -- hysterectomy, herniorrhaphy, nephrolith excision, tonsillectomy & adenoidectomy, skin bx; lumbar fusion  Aller -- nitrofurantoin, penicillins  Meds -- see reconciliation sheet. PGB, meloxicam, tramadol, midodrine  FHx --  SHx -- No tob No EtOH  ROS -- Negatives: malaise rash headache dizziness diplopia nausea ataxia angina palpitations cough vomiting incontinence dysuria  arthralgias weight gain anorexia alopecia nystagmus drowsiness tremor memory difficulty Positives: none. Also see HPI for elements of this section, particularly PMH, allergies, FH, ROS, documented therein. Physical Examination --  The patient is well groomed, thin, and in no acute distress. Vital signs: reviewed as per the graphic sheet. Skin: examination demonstrated no evident lesions. HEENT: bruits absent (carotid and supraclavicular). Heart: cardiac rhythm regular, with no murmur. Lungs: breath sounds normal throughout. .  Abdomen: grossly normal.  Extremities: no clubbing, cyanosis, or edema. Neurological Examination --  Mental status: The patient is alert. Orientation is to person, place, and time.  Thought content and form is normal.  Comprehension is normal. Phonation, articulation, resonance, and prosody are normal.  CNN: Pupils are equal, 4 mm OU, round, and normally reactive to light and accommodation, both directly and consensually. Visual fields are full to confrontation. Ptosis is absent. Extra-ocular movements are full. Nystagmus is not observed. Funduscopic exam is normal. Masseters are of normal strength. Facial movement is normal. Hearing  is grossly normal. There is no dysarthria. The gag reflex is strong bilaterally. Sternocleidomastoids and trapezii  are of normal strength. The tongue in the midline. Motor: Muscle testing including , finger abductors, biceps, triceps, deltoid, toe flexors and extensors, tibialis anterior,  triceps surae, quadriceps femoris, biceps femoris, and iliopsoases was normal. Tone is normal. Muscle bulk is normal.  Fasciculations are not seen. Pronator drift was not evident. Sensory: Sensation to to touch, temperature, and vibration was normal in the arms, legs, and face. There may be some temp,  but not vibr, loss in the feet. Reflexes: biceps triceps brachioradialis patellar Achilles plantar  R 2+ 2+ 2+ 2+ 1+ flexor  L 2+ 2+ 2+ 2+ 1+ flexor  Coordination: Dysmetria and dysdiadochokinesia are absent. Tremor is absent; dystonia is absent; chorea is absent. Gait: Station and gait are normal. Ataxia is absent. Apraxia and spasticity are not evident. Musculoskeletal: Scoliosis and lordosis are not evident  Upon standing pulse incr from low 60s to low 70s. Note pt has already received fluids. Impression Orthostatic hypotension. Cause as yet unclear. Temperature loss distally, rather mild. Consider peripheral neuropathy. However, this is mild at best; I do not expect that there is any significant autonomic neuropathy contributing to the present scenario. Recommendations and Patient Instructions --  1. Agree with midodrine for now. 2. Pt to sit in chair tomorrow for 3-4 hours to observe for peripheral oedema.  If present, pt will need compression stockings  to help prevent future syncopal spells. 3. Consider liberalizing (increasing) salt intake. 4. Should spells persist, consider tilt table testing.

## 2020-07-28 NOTE — TELEPHONE ENCOUNTER
Ricardo Desai pts daughter called very upset because pt is taking to much medication as far as the tramadol and lyrica, she did not tell her urologist she was even on these and he prescribed a med that should not be taken with the lyrica---daughter says mom fell and broke her arm, she is getting out of the hospital today---she says she hasnt taken a shower in 6 months and all she does is lay in bed--pts daughter understands that we are unable to give her info about her mom-fyi----jeannie

## 2020-07-28 NOTE — CONSULTS
[facility] CONSULT NOTE    Patient: Cherylene Oris  Unit/Bed: 0210/0210-01  YOB: 1955  MRN: [id number]  Acct: [de-identified]   Admitting Diagnosis: Orthostatic hypotension [I95.1]  Admit Date:  7/26/2020  Hospital Day: 0      Chief Complaint: syncope    History of Present Illness: 59year old female with witnessed syncope, lasting 10 seconds. Patient believes she was shaking but did not have any bowel or bladder incontinence. No tongue biting. NO chest pain or shortness of breath. Ortho vitals positive. 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 Reina Veloz Osteopenia     Sciatica     Venous insufficiency        PSHx:  Past Surgical History:   Procedure Laterality Date    COLONOSCOPY      CYSTOSCOPY N/A 2014    x2    FOREIGN BODY REMOVAL Right 04/21/15    PARTIAL, GROIN    HERNIA REPAIR      double hernia oct.  2 2013    HYSTERECTOMY  7/14/11    bso Sandra Lesser    KIDNEY STONE SURGERY      x2    OTHER SURGICAL HISTORY      laser surgery facnudo dx    [place] COLON CA SCRN NOT HI RSK IND N/A 4/19/2017    COLONOSCOPY performed by Jeff Saenz MD at [address] N/A 10/19/2016    ANAL PROCTO SIGMOIDOSCOPY RIGID / excision perineal nodule performed by Kamini Vizcarra MD at [address] [place] [address]         Social Hx:  Social History     Socioeconomic History    Marital status:      Spouse name: None    Number of children: None    Years of education: None    Highest education level: None   Occupational History    Occupation: [employer]   Social Needs    Financial resource strain: None    Food insecurity     Worry: None     Inability: None    Transportation needs     Medical: None confusion. Allergies:   Allergies   Allergen Reactions    Macrobid [Nitrofurantoin Monohydrate Macrocrystals] Swelling    Nitrofurantoin Swelling    Pcn [Penicillins] Swelling     Rash and swelling of tongue c hospitalization yrs ago; no resp distress       Current Medications:    Current Facility-Administered Medications:     midodrine (PROAMATINE) tablet 2.5 mg, 2.5 mg, Oral, BID WC, Rylie Caban MD, 2.5 mg at 07/27/20 1655    nicotine (NICODERM CQ) 14 MG/24HR 1 patch, 1 patch, Transdermal, Daily, Rylie Caban MD    nicotine (NICODERM CQ) patch REMOVAL, 1 patch, Transdermal, Daily, Rylie Caban MD    0.9 % sodium chloride infusion, , Intravenous, Continuous, Rylie Caban MD, Last Rate: 100 mL/hr at 07/27/20 1533    albuterol (PROVENTIL) nebulizer solution 2.5 mg, 2.5 mg, Nebulization, Q4H PRN, Clarisse Crystal MD    calcium-vitamin D 500-200 MG-UNIT per tablet 1 tablet, 1 tablet, Oral, BID, Clarisse Crystal MD, 1 tablet at 07/1955    pregabalin (LYRICA) capsule 75 mg, 75 mg, Oral, TID, Cali Price MD, 75 mg at 07/1955    meloxicam (MOBIC) tablet 7.5 mg, 7.5 mg, Oral, Daily, Clarisse Crystal MD, 7.5 mg at 07/27/20 0842    traMADol (ULTRAM) tablet 50 mg, 50 mg, Oral, Q6H PRN, Clarisse Crystal MD, 50 mg at 07/1955    vitamin D (CHOLECALCIFEROL) tablet 1,000 Units, 1,000 Units, Oral, Daily, Clarisse Crystal MD, 1,000 Units at 07/27/20 0843    sodium chloride flush 0.9 % injection 10 mL, 10 mL, Intravenous, 2 times per day, Clarisse Crystal MD, 10 mL at 07/26/20 2227    sodium chloride flush 0.9 % injection 10 mL, 10 mL, Intravenous, PRN, Clarisse Crystal MD    acetaminophen (TYLENOL) tablet 650 mg, 650 mg, Oral, Q6H PRN, 650 mg at 07/27/20 0843 **OR** acetaminophen (TYLENOL) suppository 650 mg, 650 mg, Rectal, Q6H PRN, Cali Price MD    polyethylene glycol (GLYCOLAX) packet 17 g, 17 g, Oral, Daily PRN, Cali Price MD, 17 g at 07/27/20 0850    promethazine (PHENERGAN) tablet 12.5 mg, 12.5 mg, Oral, Q6H PRN **OR** ondansetron (ZOFRAN) injection 4 mg, 4 mg, Intravenous, Q6H PRN, Cali Price MD    enoxaparin (LOVENOX) injection 40 mg, 40 mg, Subcutaneous, Daily, Cali Price MD, 40 mg at 07/27/20 0842    aspirin EC tablet 325 mg, 325 mg, Oral, Daily, Cali Price MD, 325 mg at 07/27/20 0843    glycopyrrolate-formoterol (BEVESPI) 9-4.8 MCG/ACT inhaler 2 puff, 2 puff, Inhalation, BID, Charles Finch MD, 2 puff at 07/27/20 1817    Physical Examination:    BP 98/65   Pulse 102   Temp 98.4 °F (36.9 °C) (Oral)   Resp 16   Ht 5' 5\" (1.651 m)   Wt 144 lb 6.4 oz (65.5 kg)   SpO2 97%   BMI 24.03 kg/m²    Physical Exam  Constitutional:       Appearance: She is well-developed. HENT:      Head: Normocephalic and atraumatic. Eyes:      Pupils: Pupils are equal, round, and reactive to light. Neck:      Musculoskeletal: Normal range of motion and neck supple. Cardiovascular:      Rate and Rhythm: Normal rate and regular rhythm. Heart sounds: No murmur. No friction rub. No gallop. Pulmonary:      Effort: Pulmonary effort is normal. No respiratory distress. Breath sounds: Normal breath sounds. No wheezing or rales. Chest:      Chest wall: No tenderness. Abdominal:      General: Bowel sounds are normal. There is no distension. Palpations: Abdomen is soft. Tenderness: There is no abdominal tenderness. Musculoskeletal: Normal range of motion. Skin:     General: Skin is warm and dry. Neurological:      Mental Status: She is alert and oriented to person, place, and time. Cranial Nerves: No cranial nerve deficit.       Coordination: Coordination normal.         LABS:  CBC:   Lab Results   Component Value Date    WBC 6.4 07/27/2020    RBC 2.86 07/27/2020    HGB 8.8 07/27/2020    HCT 26.4 07/27/2020    MCV 92.4 07/27/2020    MCH 30.8 07/27/2020    MCHC 33.3 07/27/2020    RDW 13.9 07/27/2020     07/27/2020    MPV 8.3 10/13/2014     CBC with Differential: Lab Results   Component Value Date    WBC 6.4 07/27/2020    RBC 2.86 07/27/2020    HGB 8.8 07/27/2020    HCT 26.4 07/27/2020     07/27/2020    MCV 92.4 07/27/2020    MCH 30.8 07/27/2020    MCHC 33.3 07/27/2020    RDW 13.9 07/27/2020    LYMPHOPCT 32.4 07/27/2020    MONOPCT 13.5 07/27/2020    BASOPCT 0.4 07/27/2020    MONOSABS 0.9 07/27/2020    LYMPHSABS 2.1 07/27/2020    EOSABS 0.1 07/27/2020    BASOSABS 0.0 07/27/2020     CMP:    Lab Results   Component Value Date     07/27/2020    K 3.4 07/27/2020     07/27/2020    CO2 25 07/27/2020    BUN 13 07/27/2020    CREATININE 0.55 07/27/2020    GFRAA >60.0 07/27/2020    LABGLOM >60.0 07/27/2020    GLUCOSE 138 07/27/2020    PROT 7.2 07/26/2020    LABALBU 4.2 07/26/2020    CALCIUM 8.2 07/27/2020    BILITOT 0.3 07/26/2020    ALKPHOS 105 07/26/2020    AST 15 07/26/2020    ALT 16 07/26/2020     BMP:    Lab Results   Component Value Date     07/27/2020    K 3.4 07/27/2020     07/27/2020    CO2 25 07/27/2020    BUN 13 07/27/2020    LABALBU 4.2 07/26/2020    CREATININE 0.55 07/27/2020    CALCIUM 8.2 07/27/2020    GFRAA >60.0 07/27/2020    LABGLOM >60.0 07/27/2020    GLUCOSE 138 07/27/2020     Magnesium:    Lab Results   Component Value Date    MG 2.6 07/27/2020     Troponin:    Lab Results   Component Value Date    TROPONINI <0.010 07/26/2020       EKG: EKG: normal EKG, normal sinus rhythm. ASSESSMENT/PLAN:         Active Hospital Problems    Diagnosis Date Noted    Orthostatic hypotension [I95.1] 07/26/2020        ECHO has normal EF. Suspect autonomic insufficiency. Recommend hydration, midodrine and compression socks. Eventual outpatient eval.    Electronically signed by Priscilla Oviedo.  Trice Le MD [facility] Director of Cardiology Services and CardiacCatheterization Laboratory  [facility], [place]   on 7/27/2020 at 9:05 PM

## 2020-07-28 NOTE — DISCHARGE INSTR - COC
Continuity of Care Form    Patient Name: Delfino Geronimo   :  1955  MRN:  [id number]    Admit date:  2020  Discharge date:  2020    Code Status Order: Full Code   Advance Directives:   [facility] Documentation     Date/Time Healthcare Directive Type of Healthcare Directive Copy in [address] Agent's Name Healthcare Agent's Phone Number    20 [phone]  No, patient does not have an advance directive for healthcare treatment -- -- -- -- --          Admitting Physician:  Geri Singh MD  PCP: LINSEY Oliver - CNP    Discharging Nurse: [facility] Unit/Room#: 0210/0210-01  Discharging Unit Phone Number: [phone]    Emergency Contact:   Extended Emergency Contact Information  Primary Emergency Contact: [facility]  Address: [address] [place] [address] [address]  Phone: [phone]  Work Phone: [phone]  Mobile Phone: [phone]  Relation: Spouse  Secondary Emergency Contact: [facility] [place] [address]  Phone: [phone]  Work Phone: [phone]  Mobile Phone: [phone]  Relation: Other    Past Surgical History:  Past Surgical History:   Procedure Laterality Date    COLONOSCOPY      CYSTOSCOPY N/A 2014    x2    [address] Right 04/21/15    PARTIAL, GROIN    HERNIA REPAIR      double hernia oct. 2     HYSTERECTOMY  11    bso Oris Bump    KIDNEY STONE SURGERY      x2    OTHER SURGICAL HISTORY      laser surgery facundo dx    IN COLON CA SCRN NOT HI RSK IND N/A 2017    COLONOSCOPY performed by Dionne Pierson MD at [address] N/A 10/19/2016    ANAL PROCTO SIGMOIDOSCOPY RIGID / excision perineal nodule performed by Carey Peters MD at [address] [place] [address]         Immunization History: There is no immunization history on file for this patient.     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    Drug-induced constipation K59.03    Chronic pelvic pain in female R10.2, G89.29    Abdominal pain, chronic, generalized R10.84, G89.29    2-part disp fx of surgical neck of right humerus, init S42.221A    Orthostatic hypotension I95.1       Isolation/Infection:   Isolation          No Isolation        Patient Infection Status     None to display          Nurse Assessment:  Last Vital Signs: BP (!) 143/83   Pulse 69   Temp 98.4 °F (36.9 °C) (Oral)   Resp 16   Ht 5' 5\" (1.651 m)   Wt 144 lb 6.4 oz (65.5 kg)   SpO2 98%   BMI 24.03 kg/m²     Last documented pain score (0-10 scale): Pain Level: 10  Last Weight:   Wt Readings from Last 1 Encounters:   07/26/20 144 lb 6.4 oz (65.5 kg)     Mental Status:  oriented, alert, coherent, logical, thought processes intact and able to concentrate and follow conversation    IV Access:  - None    Nursing Mobility/ADLs:  Walking   Assisted  Transfer  Assisted  Bathing  Assisted  Dressing  Assisted  Toileting  Assisted  Feeding  Assisted  Med Admin  Assisted  Med Delivery   whole    Wound Care Documentation and Therapy:  Incision 10/19/16 Perineum (Active)   Number of days: 9742        Elimination:  Continence:   · Bowel:  Yes  · Bladder: Yes  Urinary Catheter: None   Colostomy/Ileostomy/Ileal Conduit: No       Date of Last BM: 7/26/2020    Intake/Output Summary (Last 24 hours) at 7/28/2020 0739  Last data filed at 7/27/2020 0930  Gross per 24 hour   Intake 240 ml   Output --   Net 240 ml     I/O last 3 completed shifts: In: 240 [P.O.:240]  Out: -     Safety Concerns:     None    Impairments/Disabilities:      None    Nutrition Therapy:  Current Nutrition Therapy:   - Oral Diet:  General    Routes of Feeding: Oral  Liquids: Thin Liquids  Daily Fluid Restriction: no  Last Modified Barium Swallow with Video (Video Swallowing Test): not done    Treatments at the Time of Hospital Discharge:   Respiratory Treatments: ***  Oxygen Therapy:  is not on home oxygen therapy. Ventilator:    - No ventilator support    Rehab Therapies: Physical Therapy and Occupational Therapy  Weight Bearing Status/Restrictions: No weight bearing restirctions  Other Medical Equipment (for information only, NOT a DME order):  cane  Other Treatments: ***    Patient's personal belongings (please select all that are sent with patient):  ***    RN SIGNATURE:  Electronically signed by Emanuel Espinosa RN on 7/28/20 at 1:18 PM EDT    CASE MANAGEMENT/SOCIAL WORK SECTION    Inpatient Status Date: ***    Readmission Risk Assessment Score:  Readmission Risk              Risk of Unplanned Readmission:        0           Discharging to Facility/ Agency   · Name:   · Address:  · Phone:  · Fax:    Dialysis Facility (if applicable)   · Name:  · Address:  · Dialysis Schedule:  · Phone:  · Fax:    / signature: {Esignature:[id number]}    PHYSICIAN SECTION    Prognosis: Good    Condition at Discharge: Stable    Rehab Potential (if transferring to Rehab): Good    Recommended Labs or Other Treatments After Discharge:     Physician Certification: I certify the above information and transfer of Jong Quiles  is necessary for the continuing treatment of the diagnosis listed and that she requires Home Care for greater 30 days.      Update Admission H&P: No change in H&P    PHYSICIAN SIGNATURE:  Electronically signed by Bandar Arauz MD on 7/28/20 at 7:39 AM EDT

## 2020-07-28 NOTE — DISCHARGE SUMMARY
Discharge Summary    Patient:  Anu Odonnell  YOB: 1955    MRN: [id number]   Acct: [de-identified]    Primary Care Physician: LINSEY Baldwin CNP    Admit date:  7/26/2020    Discharge date:   07/28/20      Discharge Diagnoses:   <principal problem not specified>  Active Problems:    Orthostatic hypotension  Resolved Problems:    * No resolved hospital problems. *      Admitted for: [facility] Course: patient was admitted and treated for syncopal episodes, LOC, severe orthostatic hypotension. On admission was hydrated, midodrine was initiated. Was evaluated by cardiology/neurology, 2 d echo, carotid US performed. Her condition improved and will be DC home with midodrine. Recommended to follow up with cardio/neurology after DC    Consultants:  Neuro/cardio    Discharge Medications:       Medication List      START taking these medications    midodrine 2.5 MG tablet  Commonly known as:  PROAMATINE  Take 1 tablet by mouth 2 times daily (with meals)        CHANGE how you take these medications    traMADol 50 MG tablet  Commonly known as:  ULTRAM  Take 2 tablets by mouth every 8 hours as needed for Pain for up to 92 days. TAKE 2 TABLETS BY MOUTH EVERY 8 HOURS AS NEEDED FOR PAIN  What changed:  Another medication with the same name was removed. Continue taking this medication, and follow the directions you see here.         CONTINUE taking these medications    * albuterol sulfate  (90 Base) MCG/ACT inhaler  Commonly known as:  ProAir HFA  Inhale 2 puffs into the lungs every 6 hours as needed for Wheezing or Shortness of Breath     * albuterol sulfate  (90 Base) MCG/ACT inhaler  Inhale 2 puffs into the lungs every 6 hours as needed for Wheezing     Face Mask Earloop-Style Misc  1 Device by Does not apply route once for 1 dose     meloxicam 15 MG tablet  Commonly known as:  MOBIC  Take 1 tablet by mouth once daily     naloxone 4 MG/0.1ML Liqd nasal spray  1 spray by Nasal route as needed for Opioid Reversal     Oscal 500/200 D-3 500-200 MG-UNIT per tablet  Generic drug:  calcium-vitamin D     pregabalin 75 MG capsule  Commonly known as:  Lyrica  Take 1 capsule by mouth 3 times daily for 90 days. therapeutic multivitamin-minerals tablet     umeclidinium-vilanterol 62.5-25 MCG/INH Aepb inhaler  Commonly known as: Anoro Ellipta  Inhale 1 puff into the lungs daily     uribel 118 MG Caps     vitamin D 1000 UNIT Tabs tablet  Commonly known as:  CHOLECALCIFEROL         * This list has 2 medication(s) that are the same as other medications prescribed for you. Read the directions carefully, and ask your doctor or other care provider to review them with you.             STOP taking these medications    ciprofloxacin 500 MG tablet  Commonly known as:  CIPRO     docusate sodium 100 MG capsule  Commonly known as:  COLACE     oxyCODONE-acetaminophen 5-325 MG per tablet  Commonly known as:  Percocet           Where to Get Your Medications      These medications were sent to [facility], [address] [place] [address]    Phone:  [phone]   · midodrine 2.5 MG tablet         Physical Exam:    Vitals:  Vitals:    07/27/20 0940 07/27/20 0945 07/27/20 1817 07/28/20 0600   BP: 127/77 98/65  (!) 143/83   Pulse: 93 102  69   Resp:       Temp:    98.4 °F (36.9 °C)   TempSrc:    Oral   SpO2:   97% 98%   Weight:       Height:         Weight: Weight: 144 lb 6.4 oz (65.5 kg)     24 hour intake/output:    Intake/Output Summary (Last 24 hours) at 7/28/2020 0745  Last data filed at 7/27/2020 0930  Gross per 24 hour   Intake 240 ml   Output --   Net 240 ml       General appearance - alert, well appearing, and in no distress  Chest - clear to auscultation, no wheezes, rales or rhonchi, symmetric air entry  Heart - normal rate, regular rhythm, normal S1, S2, no murmurs, rubs, clicks or gallops  Abdomen - soft, nontender, nondistended, no masses or organomegaly  Obese: No; Protuberant: No   Neurological - alert, oriented, normal speech, no focal findings or movement disorder noted  Extremities - peripheral pulses normal, no pedal edema, no clubbing or cyanosis  Skin - normal coloration and turgor, no rashes, no suspicious skin lesions noted        Radiology reports as per the Radiologist  Radiology: Ct Head Wo Contrast    Result Date: 2020  CT HEAD WO CONTRAST CLINICAL HISTORY:  Syncope COMPARISON: None TECHNIQUE: Multiple unenhanced serial axial images of the brain from the vertex of the skull to the base of the skull were performed. FINDINGS: The ventricles are dilated. This is compensatory to the surrounding moderate generalized parenchymal volume loss. No mass. No midline shift. The cisterns are patent. There are white matter and periventricular changes most likely consistent with chronic small vessel disease. No acute intra-axial or extra-axial findings. The visualized osseous structures are unremarkable. There are retention cyst and/or polyps in the maxillary sinuses. The right measures 1.0 cm in the left measures 9 mm. NO ACUTE INTRA-AXIAL OR EXTRA-AXIAL FINDINGS. All CT scans at this facility use dose modulation, iterative reconstruction, and/or weight based dosing when appropriate to reduce radiation dose to as low as reasonably achievable. Xr Chest Portable    Result Date: 2020  Patient MRN: [id number] : 1955 Age:  59 years Gender: Female Order Date: 2020 4:30 PM. Exam: XR CHEST PORTABLE Number of Views: 1 Indication:  Syncope Comparison: 2013 Findings: Vascular calcifications thoracic aorta. Cardiomediastinal silhouette is within normal limits. Lungs are clear without evidence of infiltrate/pneumonia, pneumothorax. Small amount left pleural effusion. Impression:  1. Small amount of left pleural effusion. 2. Vascular calcifications thoracic aorta.  3. Please note this study does not exclude the possibility of underlying CoVid-19 viral infection and/or underlying pulmonary involvement     Us Carotid Artery Bilateral    Result Date: 2020   Patient MRN:  [id number] : 1955 Age: 59 years Gender: Female Order Date:  2020 10:00 AM EXAM: US CAROTID ARTERY BILATERAL NUMBER OF IMAGES:  59 INDICATION:  hypotension/dizziness  COMPARISON: None FINDINGS: Right side: Proximal common carotid artery peak systolic velocity is 447.2 centimeters per second Mid, common carotid artery peak systolic velocity is 951.4 centimeters per second. Distal common carotid artery peak systolic velocity is 955.3 centimeters per second External carotid artery peak systolic velocity is 805.7 centimeters per second. Peak systolic velocity carotid bulb 73.6 cm/s Proximal internal carotid artery peak systolic velocity is 70.9 centimeters per second Mid internal carotid artery peak systolic velocity is 45.8 centimeters per second. Distal internal carotid artery peak systolic velocity is 848.6 centimeters per second Vertebral artery peak systolic velocity is 78.3 centimeters per second. Vertebral artery flow is antegrade ICA/CCA ratio is 1.1 Left side: Proximal common carotid artery peak systolic velocity is 55.3 centimeters per second Mid, common carotid artery peak systolic velocity is 033.7 centimeters per second. Distal common carotid artery peak systolic velocity is 40.8 centimeters per second. External carotid artery peak systolic velocity is 636.3 centimeters per second. Peak systolic velocity carotid bulb 38.4 cm/s. Proximal internal carotid artery peak systolic velocity is 93.5 centimeters per second Mid internal carotid artery peak systolic velocity is 84.6 centimeters per second. Distal internal carotid artery peak systolic velocity is 619.2 centimeters per second Vertebral artery peak systolic velocity is 19.5 centimeters per second. Vertebral artery flow is antegrade ICA/CCA ratio is 1.0.  Calcified and noncalcified plaque bilateral carotid artery bulb/bifurcations and internal carotid arteries. Peak systolic velocities, ICA/CCA ratios and antegrade vertebral artery flow as above. See above for details of the examination and below for internal carotid artery interpretation guidelines. 1. No areas of estimated luminal stenosis greater than 0-49 percent within the bilateral internal carotid arteries. . 2. Calcified and noncalcified plaque bilateral carotid artery bulb/hypertension and bilateral internal carotid arteries. GOSINK CRITERIA Diameter          PSV t            EDV t          PSV          EDV          Stenosis Site       %              cm/sec          cm/sec      ICA/CCA    ICA/CCA 0-49                 <124              <40             <2:1           ---                 --- 50-69              125-225                  >2:1           ---                 --- 70-89               225-325          >100          >4:1           >5:1              --- 90%+                >325              >100          >4:1           >9:1           Damped resistive CCA >95%               May be            May be      Damped      ---              Damped resistive CCA                       decreased      decreased  resistive CCA Validated velocity measurements with angiographic measurements, velocity criteria are extrapolated from diameter data as defined by the Society of Radiologist in [address] [place] [address] Drive Radiology 2003; 706;042-219.         Results for orders placed or performed during the hospital encounter of 07/26/20   Comprehensive Metabolic Panel   Result Value Ref Range    Sodium 140 135 - 144 mEq/L    Potassium 3.5 3.4 - 4.9 mEq/L    Chloride 99 95 - 107 mEq/L    CO2 24 20 - 31 mEq/L    Anion Gap 17 (H) 9 - 15 mEq/L    Glucose 122 (H) 70 - 99 mg/dL    BUN 10 8 - 23 mg/dL    CREATININE 0.61 0.50 - 0.90 mg/dL    GFR Non-African American >60.0 >60    GFR  >60.0 >60    Calcium 9.1 8.5 - 9.9 mg/dL    Total Scrn, Ur Neg Negative <50 ng/mL    Opiate Scrn, Ur Neg Negative <100 ng/mL    Barbiturate Screen, Ur Neg Negative <818 ng/mL    Tricyclic POSITIVE (A) Negative <300 ng/mL    Methadone Screen, Urine Neg Negative <200 ng/mL    Propoxyphene Screen, Urine Neg Negative <300 ng/mL    Methamphetamine, Urine Neg Negative <1000 ng/mL    UR Oxycodone Rapid Screen Neg Negative <100 ng/mL    Drug Screen Comment: see below    Ethanol   Result Value Ref Range    Ethanol Lvl <10 mg/dL    Ethanol percent Not indicated G/dL   CBC auto differential   Result Value Ref Range    WBC 6.4 4.8 - 10.8 K/uL    RBC 2.86 (L) 4.20 - 5.40 M/uL    Hemoglobin 8.8 (L) 12.0 - 16.0 g/dL    Hematocrit 26.4 (L) 37.0 - 47.0 %    MCV 92.4 82.0 - 100.0 fL    MCH 30.8 27.0 - 31.3 pg    MCHC 33.3 33.0 - 37.0 %    RDW 13.9 11.5 - 14.5 %    Platelets 153 431 - 556 K/uL    Neutrophils % 51.7 %    Lymphocytes % 32.4 %    Monocytes % 13.5 %    Eosinophils % 2.0 %    Basophils % 0.4 %    Neutrophils Absolute 3.3 1.4 - 6.5 K/uL    Lymphocytes Absolute 2.1 1.0 - 4.8 K/uL    Monocytes Absolute 0.9 (H) 0.2 - 0.8 K/uL    Eosinophils Absolute 0.1 0.0 - 0.7 K/uL    Basophils Absolute 0.0 0.0 - 0.2 K/uL   Basic Metabolic Panel w/ Reflex to MG   Result Value Ref Range    Sodium 143 135 - 144 mEq/L    Potassium reflex Magnesium 3.4 3.4 - 4.9 mEq/L    Chloride 108 (H) 95 - 107 mEq/L    CO2 25 20 - 31 mEq/L    Anion Gap 10 9 - 15 mEq/L    Glucose 138 (H) 70 - 99 mg/dL    BUN 13 8 - 23 mg/dL    CREATININE 0.55 0.50 - 0.90 mg/dL    GFR Non-African American >60.0 >60    GFR  >60.0 >60    Calcium 8.2 (L) 8.5 - 9.9 mg/dL   TSH without Reflex   Result Value Ref Range    TSH 1.350 0.440 - 3.860 uIU/mL   CORTISOL PM, TOTAL   Result Value Ref Range    Cortisol - PM 17.2 (H) 2.3 - 11.9 ug/dL   Magnesium   Result Value Ref Range    Magnesium 2.6 (H) 1.7 - 2.4 mg/dL   EKG 12 Lead - Chest Pain   Result Value Ref Range    Ventricular Rate 103 BPM    Atrial Rate 103 BPM P-R Interval 148 ms    QRS Duration 78 ms    Q-T Interval 340 ms    QTc Calculation (Bazett) 445 ms    P Axis 84 degrees    R Axis -40 degrees    T Axis 34 degrees   EKG 12 Lead   Result Value Ref Range    Ventricular Rate 78 BPM    Atrial Rate 78 BPM    P-R Interval 174 ms    QRS Duration 90 ms    Q-T Interval 376 ms    QTc Calculation (Bazett) 428 ms    P Axis 59 degrees    R Axis -34 degrees    T Axis 45 degrees       Diet:  DIET GENERAL;     Activity:  Activity as tolerated (Patient may move about with assist as indicated or with supervision.)    Follow-up:  in 1 weeks with LINSEY Wade CNP,      Disposition: home    Condition: Stable    Time Spent: 50 minutes    Electronically signed by Westley Norwood MD on 7/28/2020 at 7:45 AM    Discharging Hospitalist

## 2020-07-28 NOTE — PROGRESS NOTES
Occupational Therapy   Occupational Therapy Initial Assessment- Obs  Date: 2020   Patient Name: Harmeet Lara  MRN: [id number]     : 1955    Date of Service: 2020    Discharge Recommendations:  Home with assist PRN, Home with Home health OT(family looking to get pt increased help)       Assessment   Performance deficits / Impairments: Decreased functional mobility ; Decreased ADL status; Decreased ROM; Decreased strength;Decreased endurance;Decreased balance;Decreased coordination  Assessment: Pt is a 61y/o female PLOF lives with family, was able to perform ADL MI level- dtr spotted pt for safety with shower xfers. Pt [facility] [place] [facility]* orthostatic hypotension with 2 recent falls and a resulting R humerus fx. Pt demo's the above resulting perf def/impair and would benefit from OT skilled services to maximize strength/end and safety/I with ADL for safe d/c transition. Prognosis: Good  History: multi comorb  Exam: 7 perf def/impair  OT Education: OT Role;Plan of Care  REQUIRES OT FOLLOW UP: Yes  Safety Devices  Safety Devices in place: Yes  Type of devices: All fall risk precautions in place           Patient Diagnosis(es): The primary encounter diagnosis was Dizziness. Diagnoses of Syncope and collapse, Orthostasis, Frequent falls, and Polypharmacy were also pertinent to this visit. has a past medical history of Bowen's disease, Chronic back pain, Chronic kidney disease, COPD (chronic obstructive pulmonary disease) ([address] Irritable bowel syndrome with constipation, Lumbosacral spondylosis without myelopathy- Dr. Grubbs Dy, Osteopenia, Sciatica, and Venous insufficiency. has a past surgical history that includes Hysterectomy (11); other surgical history; hernia repair; Kidney stone surgery; Foreign Body Removal (Right, 04/21/15); Colonoscopy; Cystocopy (N/A, ); skin biopsy; Tonsillectomy; Sigmoidoscopy (N/A, 10/19/2016); and pr colon ca scrn not hi rsk ind (N/A, 2017). Restrictions  Restrictions/Precautions  Restrictions/Precautions: Fall Risk(3 falls in the last month- dizziness)  Required Braces or Orthoses?: Yes  Required Braces or Orthoses  Left Upper Extremity Brace/Splint: Sling  Position Activity Restriction  Other position/activity restrictions: No orders regarding ROM/WB restrictions R UE. NWB R UE with no shoulder ROM unless other orders received    Subjective   General  Chart Reviewed: Yes  Patient assessed for rehabilitation services?: Yes  Family / Caregiver Present: No  Referring Practitioner: Dr. Felicity Ray  Diagnosis: orthostatic hypotension with 2 recent falls and a resulting R humerus fx  Subjective  Subjective: Pt agreeable to OT eval/tx  Patient Currently in Pain: Yes  Pain Assessment  Pain Assessment: 0-10  Pain Level: 3  Pain Type: Chronic pain  Pain Location: Hip;Leg(\"My sciatica\")  Pain Orientation: Right  Multiple Pain Sites: Yes  Pain 2  Pain Rating 2: 5  Pain Type 2: Acute Pain  Pain Location 2: Arm  Pain Orientation 2: Right  Vital Signs  Level of Consciousness: Alert  Patient Currently in Pain: Yes  Oxygen Therapy  O2 Device: None (Room air)  Social/Functional History  Social/Functional History  Lives With: Family(exhusband, daughter and granddaughter)  Type of Home: House  Home Layout: Two level, Bed/Bath upstairs(8 steps to upstairs with handrail on L side ascending)  Home Access: Stairs to enter with rails  Entrance Stairs - Number of Steps: 2  Bathroom Shower/Tub: Walk-in shower  Bathroom Toilet: Handicap height  Bathroom Equipment: Grab bars in shower  Bathroom Accessibility: Accessible  Home Equipment: (None)  Receives Help From: Family  ADL Assistance: Needs assistance(daughter helps with dressing and bathing due to chronic sciatica and since shoulder fracture Thursday, increased assist needed)  Bath: (Reports dtr assisted with xfers into/out of shower but pt )  Homemaking Assistance: Needs assistance(daughter does housework and cooking)  Homemaking Responsibilities: No  Ambulation Assistance: Independent  Transfer Assistance: Independent  Active : No  Occupation: On disability       Objective   Vision: Impaired  Vision Exceptions: Wears glasses for reading  Hearing: Within functional limits    Orientation  Overall Orientation Status: Within Normal Limits  Observation/Palpation  Observation: sling R UE  Functional Mobility  Functional - Mobility Device: Cane  Activity: To/from bathroom  Assist Level: Contact guard assistance(/SBA)  Toilet Transfers  Toilet - Technique: Ambulating  Equipment Used: Grab bars  Toilet Transfer: Contact guard assistance;Stand by assistance  ADL  Feeding: Setup  Grooming: Contact guard assistance;Stand by assistance(standing at sink)  UE Bathing: Minimal assistance; Moderate assistance  LE Bathing: Minimal assistance  UE Dressing: Minimal assistance  LE Dressing: Minimal assistance; Moderate assistance  Toileting: Contact guard assistance;Stand by assistance  Tone RUE  RUE Tone: Normotonic  Tone LUE  LUE Tone: Normotonic  Coordination  Movements Are Fluid And Coordinated: No  Coordination and Movement description: Gross motor impairments;Right UE     Bed mobility  Supine to Sit: Minimal assistance(HOB elevated)  Sit to Supine: Minimal assistance  Transfers  Sit to stand: Stand by assistance  Stand to sit: Stand by assistance                       LUE AROM (degrees)  LUE AROM : WFL  Left Hand AROM (degrees)  Left Hand AROM: WFL  RUE AROM (degrees)  RUE AROM : Exceptions  RUE General AROM: In sling- presume NWB and no shoulder ROM at this time- no written orders at this time  Right Hand AROM (degrees)  Right Hand AROM: WFL  LUE Strength  Gross LUE Strength: Exceptions to [facility]  L Shoulder Flex: 3-/5  RUE Strength  Gross RUE Strength: (NT- presuming NWB at this time)     Hand Dominance  Hand Dominance: Right             Plan   Plan  Times per week: 3-6x/wk  Times per day: Daily  Plan weeks: <1- med pt  Current Treatment Recommendations: Strengthening, ROM, Balance Training, Functional Mobility Training, Endurance Training, Stair training, Pain Management, Safety Education & Training, Patient/Caregiver Education & Training            Goals  Short term goals  Time Frame for Short term goals: 3-5 days- med pt  Short term goal 1: Increase to Bartolome for UB dressing, including mgmt of RUE sling  Short term goal 2: Increase to CGA LB dressing and bathing  Short term goal 3: Increase to SBA/Spv fxl ADL xfers  Long term goals  Time Frame for Long term goals : Same as STGs- med pt  Long term goal 1: Same as STGs- med pt   Long term goal 2: Same as STGs- med pt  Patient Goals   Patient goals :  Ortega Barone said I get to go home today\"       Therapy Time   Individual Concurrent Group Co-treatment   Time In  9:10         Time Out  9:55         Minutes  [address] [place], [place]

## 2020-07-28 NOTE — TELEPHONE ENCOUNTER
Fransisco Bullock. This is useful information and I will discuss weaning the medication with the patient next week.    Please schedule per my last note

## 2020-07-29 NOTE — TELEPHONE ENCOUNTER
It looks like patient was placed on Dr. Meliza Costa schedule for next Tuesday for an in office visit for a 15 minutes. Dr. Meliza Costa is not in the office on that day and your schedule is booked for that day. Dr. Meliza Costa schedule is also booked for next week. Patient will have to reschedule or is there something else we can do for her? justine advise.

## 2020-07-30 ENCOUNTER — TELEPHONE (OUTPATIENT)
Dept: INFECTIOUS DISEASES | Age: 65
End: 2020-07-30

## 2020-07-31 ENCOUNTER — VIRTUAL VISIT (OUTPATIENT)
Dept: FAMILY MEDICINE CLINIC | Age: 65
End: 2020-07-31
Payer: MEDICARE

## 2020-07-31 PROCEDURE — 99443 PR PHYS/QHP TELEPHONE EVALUATION 21-30 MIN: CPT | Performed by: NURSE PRACTITIONER

## 2020-07-31 RX ORDER — CIPROFLOXACIN 500 MG/1
500 TABLET, FILM COATED ORAL 2 TIMES DAILY
Qty: 20 TABLET | Refills: 0 | Status: SHIPPED | OUTPATIENT
Start: 2020-07-31 | End: 2020-08-10

## 2020-07-31 ASSESSMENT — PATIENT HEALTH QUESTIONNAIRE - PHQ9
SUM OF ALL RESPONSES TO PHQ QUESTIONS 1-9: 1
1. LITTLE INTEREST OR PLEASURE IN DOING THINGS: 0
2. FEELING DOWN, DEPRESSED OR HOPELESS: 1
SUM OF ALL RESPONSES TO PHQ QUESTIONS 1-9: 1
SUM OF ALL RESPONSES TO PHQ9 QUESTIONS 1 & 2: 1

## 2020-07-31 NOTE — PROGRESS NOTES
2020    TELEHEALTH EVALUATION -- Audio/Visual (During  public health emergency)    Due to [place] 19 outbreak, patient's office visit was converted to a virtual visit. Patient was contacted and agreed to proceed with a virtual visit via Telephone Visit  The risks and benefits of converting to a virtual visit were discussed in light of the current infectious disease epidemic. Patient also understood that insurance coverage and co-pays are up to their individual insurance plans. Raymundo Lawrence is a 59 y.o. female being evaluated by a Virtual Visit (video visit) encounter to address concerns as mentioned above. A caregiver was present when appropriate. Due to this being a TeleHealth encounter (During  public health emergency), evaluation of the following organ systems was limited: Vitals/Constitutional/EENT/Resp/CV/GI//MS/Neuro/Skin/Heme-Lymph-Imm. Pursuant to the emergency declaration under the [address] [place] [address] authority and the [employer] and [employer] Act, this Virtual Visit was conducted with patient's (and/or legal guardian's) consent, to reduce the patient's risk of exposure to COVID-19 and provide necessary medical care. The patient (and/or legal guardian) has also been advised to contact this office for worsening conditions or problems, and seek emergency medical treatment and/or call 911 if deemed necessary. Patient identification was verified at the start of the visit: Yes    Total time spent for this encounter: 30 minutes    Services were provided through a video synchronous discussion virtually to substitute for in-person clinic visit. Patient and provider were located at their individual homes. The patient is talking with me virtually from her home and I am located at my office in [facility].        HPI:    Raymundo Lawrence (:  1955) has requested an audio/video evaluation for the following concern(s):    Syncope/hypotension/UTI: She states that she was recently in the emergency room twice. The first time was after she fell and broke the top part of her arm/shoulder area. The second time she passed out and states that she was probably unconscious for about 10 seconds. She states that while in the hospital she was evaluated by neurology and cardiology as well as internal medicine. It was theorized that low blood pressure could have been from combination of medications. Bladder medication oxybutynin was stopped in the hospital.  She states that she has never had issues with her pain medication. She is not sure what could have caused her blood pressure to be as low as it was. She has never had issues like this before. She does admit to not eating much salt in her diet. Has not had any lower extremity edema. She states that currently she does not have any lightheadedness or dizziness. She has not had any near syncope episodes since discharge from hospital.  States that her ex- will be coming to her home today to take her blood pressure later on. She mentions that prior to going into the emergency room, she had a urine test done that was ordered by infectious disease and ciprofloxacin 500 mg was ordered for bacterial infection. She states that she was only able to take it 1 day before going into the hospital and she was told in the hospital that she had no UTI. She continues to have significant burning with urination. Chronic back pain/right shoulder pain after fall: She states that her chronic pain has been overall unchanged and she continues to take Ultram 2 tablets every 8 hours and Lyrica for chronic nerve pain as well. States that she has taken this regimen for years and has never had any issues with feeling lightheaded or dizzy.   She does not feel that the medication has anything to do with her syncopal episode especially since her blood pressure was found to be so low when she presented to the emergency room. She states that she does have an appointment scheduled outpatient with orthopedic specialist next week. Was told that she would not need surgery but it could take 4 to 6 weeks for the joint to heal.  She is trying to take it easy and is changing positions slowly at home. Has her daughter around to help her. Review of Systems   This patient reports no chest pain or pressure. There is no shortness of breath or cough. The patient reports no nausea or vomiting. There is no heartburn or indigestion. There is no diarrhea or constipation. No black, bloody, mucusy or tarry stool noticed. The patient reports no bloating and no change in appetite. There is no fever or chills. No body aches. Prior to Visit Medications    Medication Sig Taking? Authorizing Provider   ciprofloxacin (CIPRO) 500 MG tablet Take 1 tablet by mouth 2 times daily for 10 days Yes LINSEY Mendieta CNP   midodrine (PROAMATINE) 2.5 MG tablet Take 1 tablet by mouth 2 times daily (with meals) Yes Cecilia Fung MD   traMADol (ULTRAM) 50 MG tablet Take 2 tablets by mouth every 8 hours as needed for Pain for up to 92 days. TAKE 2 TABLETS BY MOUTH EVERY 8 HOURS AS NEEDED FOR PAIN Yes LINSEY Mendieta CNP   pregabalin (LYRICA) 75 MG capsule Take 1 capsule by mouth 3 times daily for 90 days. Yes LINSEY Mendieta CNP   albuterol sulfate  (90 Base) MCG/ACT inhaler Inhale 2 puffs into the lungs every 6 hours as needed for Wheezing Yes LINSEY Mendieta CNP   meloxicam (MOBIC) 15 MG tablet Take 1 tablet by mouth once daily Yes LINSEY Mendieta CNP   albuterol sulfate HFA (PROAIR HFA) 108 (90 Base) MCG/ACT inhaler Inhale 2 puffs into the lungs every 6 hours as needed for Wheezing or Shortness of Breath Yes LINSEY Mendieta CNP   calcium-vitamin D (OSCAL 500/200 D-3) 500-200 MG-UNIT per tablet Take 1 tablet by mouth 2 times daily.  Yes Historical Provider, MD naloxone 4 MG/0.1ML LIQD nasal spray 1 spray by Nasal route as needed for Opioid Reversal  Patient not taking: Reported on 7/31/2020  Patti Patel MD   Masks (FACE MASK EARLOOP-STYLE) MISC 1 Device by Does not apply route once for 1 dose  Barb LINSEY Phelan - CNP   therapeutic multivitamin-minerals [facility]) tablet Take 1 tablet by mouth daily. Historical Provider, MD       Social History     Tobacco Use    Smoking status: Current Every Day Smoker     Packs/day: 0.50     Years: 20.00     Pack years: 10.00     Types: Cigarettes    Smokeless tobacco: Never Used    Tobacco comment: relaxes me   Substance Use Topics    Alcohol use: Yes     Alcohol/week: 5.0 standard drinks     Types: 5 Cans of beer per week     Comment: social    Drug use: No       PHYSICAL EXAMINATION:  [ INSTRUCTIONS:  \"[x]\" Indicates a positive item  \"[]\" Indicates a negative item  -- DELETE ALL ITEMS NOT EXAMINED]  [x] Alert  [x] Oriented to person/place/time    [x] No apparent distress  [] Toxic appearing    [] Face flushed appearing [] Sclera clear  [] Lips are cyanotic      [x] Breathing appears normal  [] Appears tachypneic      [] Rash on visible skin    [x] Cranial Nerves II-XII grossly intact    [x] Motor grossly intact in visible upper extremities    [] Motor grossly intact in visible lower extremities    [x] Normal Mood  [] Anxious appearing    [] Depressed appearing  [] Confused appearing      [] Poor short term memory  [] Poor long term memory    [] OTHER:      Due to this being a TeleHealth encounter, evaluation of the following organ systems is limited: Vitals/Constitutional/EENT/Resp/CV/GI//MS/Neuro/Skin/Heme-Lymph-Imm. ASSESSMENT/PLAN:    Diagnosis Orders   1. Syncope, unspecified syncope type     2. Urinary tract infection without hematuria, site unspecified     3. Hypotension, unspecified hypotension type     4. Acute pain of right shoulder     5.  Chronic right-sided low back pain with right-sided sciatica No follow-ups on file. I reviewed hospital records to include internal medicine, cardiology and neurology. Follow-up next week with Dr. Emy Ocampo for virtual visit as well. Discussed that visit with physician is acceptable given her recent syncopal event. Discussed that additional eyes on her case may be helpful as well. She verbalizes understanding and states that she will keep this appointment as well as my appointment with her in 2 weeks. She will be seeing cardiology outpatient next week as well. Keep this appointment. Continue taking ProAmatine and increase salt intake as tolerated. Change positions slowly and notify me should any near syncope symptoms occur. Discussed likely combination of medication contributing to hypotensive state. Anticholinergic medication, oxybutynin was discontinued. She was also noted to have positive urine culture days before ER evaluation. She is encouraged to restart antibiotic as she only had 1 dose. Discussed that significant urinary infection can contribute to hypotension. Please note this report has been partially produced using speech recognition software and may cause contain errors related to that system including grammar, punctuation and spelling as well as words and phrases that may seem inappropriate. If there are questions or concerns please feel free to contact me to clarify. An  electronic signature was used to authenticate this note. --Suman Kitchen, LINSEY - CNP on 7/31/2020 at 1:03 PM        Pursuant to the emergency declaration under the [facility] waiver authority and the [employer] Act, this Virtual  Visit was conducted, with patient's consent, to reduce the patient's risk of exposure to COVID-19 and provide continuity of care for an established patient.     Services were provided through a video synchronous discussion virtually to

## 2020-08-05 ENCOUNTER — TELEPHONE (OUTPATIENT)
Dept: FAMILY MEDICINE CLINIC | Age: 65
End: 2020-08-05

## 2020-08-05 ENCOUNTER — TELEPHONE (OUTPATIENT)
Dept: INFECTIOUS DISEASES | Age: 65
End: 2020-08-05

## 2020-08-05 ENCOUNTER — HOSPITAL ENCOUNTER (OUTPATIENT)
Age: 65
Setting detail: SPECIMEN
Discharge: HOME | End: 2020-08-05
Payer: MEDICARE

## 2020-08-05 LAB
BACTERIA: NEGATIVE /HPF
BILIRUBIN URINE: NEGATIVE
BLOOD, URINE: NEGATIVE
CLARITY: ABNORMAL
COLOR: YELLOW
EPITHELIAL CELLS, UA: ABNORMAL /HPF (ref 0–5)
GLUCOSE URINE: NEGATIVE MG/DL
HYALINE CASTS: ABNORMAL /HPF (ref 0–5)
KETONES, URINE: NEGATIVE MG/DL
LEUKOCYTE ESTERASE, URINE: ABNORMAL
NITRITE, URINE: NEGATIVE
PH UA: 5.5 (ref 5–9)
PROTEIN UA: NEGATIVE MG/DL
RBC UA: ABNORMAL /HPF (ref 0–5)
SPECIFIC GRAVITY UA: 1.01 (ref 1–1.03)
URINE REFLEX TO CULTURE: YES
UROBILINOGEN, URINE: 0.2 E.U./DL
WBC UA: ABNORMAL /HPF (ref 0–5)

## 2020-08-05 PROCEDURE — 81001 URINALYSIS AUTO W/SCOPE: CPT

## 2020-08-05 PROCEDURE — 87086 URINE CULTURE/COLONY COUNT: CPT

## 2020-08-05 NOTE — TELEPHONE ENCOUNTER
Patient states she is still having symptoms and she does not believe the cirpo is working. She went to [facility] disease and left a urine for them. She would like for you to review the results when they come in. She is aware that you are not here on Wednesdays.

## 2020-08-05 NOTE — TELEPHONE ENCOUNTER
Patient experiencing Frequency to urine, pressure, and feels like she cannot Empty her bladder. Advised to have a urrine sample tested. Per Dr Angle Huynh, she had stated to test urine if she should have any SnS. Patient will provide a sample today to [facility].

## 2020-08-07 ENCOUNTER — TELEPHONE (OUTPATIENT)
Dept: ORTHOPEDIC SURGERY | Age: 65
End: 2020-08-07

## 2020-08-07 ENCOUNTER — TELEPHONE (OUTPATIENT)
Dept: FAMILY MEDICINE CLINIC | Age: 65
End: 2020-08-07

## 2020-08-07 LAB — URINE CULTURE, ROUTINE: NORMAL

## 2020-08-07 NOTE — TELEPHONE ENCOUNTER
Please let her know that culture results are not back yet. She can contact ID office to see what a provider there recommends.

## 2020-08-07 NOTE — TELEPHONE ENCOUNTER
Patient called regarding her urine sample results. She states she was unable to sleep last night due to burning and also stated that she does not actually urinate every time she goes to the bathroom. Please advise.

## 2020-08-07 NOTE — TELEPHONE ENCOUNTER
Pt called and cancelled appt for today, no reason given for cancellation. Pt rescheduled for 7/21 and is asking if she is able to take sling off yet before her new follow up appt? Please advise.

## 2020-08-10 ENCOUNTER — VIRTUAL VISIT (OUTPATIENT)
Dept: INFECTIOUS DISEASES | Age: 65
End: 2020-08-10
Payer: MEDICARE

## 2020-08-10 PROCEDURE — 99214 OFFICE O/P EST MOD 30 MIN: CPT | Performed by: INTERNAL MEDICINE

## 2020-08-10 RX ORDER — FLUCONAZOLE 150 MG/1
150 TABLET ORAL EVERY OTHER DAY
Qty: 5 TABLET | Refills: 0 | Status: SHIPPED | OUTPATIENT
Start: 2020-08-10 | End: 2020-09-17 | Stop reason: CLARIF

## 2020-08-10 RX ORDER — LINEZOLID 600 MG/1
600 TABLET, FILM COATED ORAL 2 TIMES DAILY
Qty: 6 TABLET | Refills: 0 | Status: SHIPPED | OUTPATIENT
Start: 2020-08-10 | End: 2020-08-13

## 2020-08-10 NOTE — PROGRESS NOTES
8/10/2020    TELEHEALTH EVALUATION -- Audio/Visual (During [id number] public health emergency)      Rosa Fay (:  1955) has requested an audio/video evaluation for the following concern(s):    UTI     Due to the COVID-19 outbreak, patient's office visit was converted to a virtual visit. Patient was contacted and agreed to proceed with a virtual visit with me via Doxy. me with my location at the office. Patient reports that they are located at home. The risks and benefits of converting to a virtual visit were discussed in light of the current infectious disease epidemic. Services were provided through an audio/video synchronous discussion virtually to substitute for in person clinic visit. THIS virtual visit was conducted via interactive/real-time audio/video. Due to this being a TeleHealth encounter, evaluation of the following organ systems is limited: Vitals/Constitutional/EENT/Resp/CV/GI//MS/Neuro/Skin/Heme-Lymph-Imm.}    Pursuant to the emergency declaration under the [facility] waiver authority and the [employer] and [employer] Act, this Virtual Visit was conducted, with patient's consent, to reduce the patient's risk of exposure to COVID-19 and provide care for the patient. Rosa Fay  1955  female  Medical Record Number: [id number]    Patient informed that I am an Infectious Disease physician and permission obtained from the patient to speak in front of any visitors prior to any discussion for HIPPA purposes. HPI:  Patient with hx of Bowens disease, presenting for dysuria and UTIs. Complains of urinary frequency and bladder spasms, particularly overnight. Per daughter, she wakes up every 20 min, sometimes has to urinate and sometimes no urine comes out. Has dysuria, no bleeding.    Was initially on oxybutinin but has low BP and takes lyrica and other medications that interacted and caused her to fall, pass out and break a bone in recent past.   She called her urologist office for bladder spasms and states that she was told to call ID. Daughter states that she is currently on another course of Cipro. 8/5/2020 - UA + LE and cloudy urine. Nitrites negative, UC negative. (Currently on Cipro but still having burning, frequency, spasms, and pain.)  7/2020 - citrobacter and e faecalis ( treated with Cipro but symptoms still not relieved )   5/2020 - Enterococcus faecalis ( relieved of  symptoms with tx with zyvox x 5 days)  3/17/20: UA only small amount of LE.   2/13/2020: pseudomonas (multi sensitive)  1/9/2020: Ecoli (R amp and bactrim)  10/2019: E coli ( R bactrim)  9/2019: negative  8/2019: Pseudomonas ( I Aztreonam )     Tells me that she has issues with urinary incontinence with pressure and constipation. She has been to uro-gyn and did not have any sign of prolapse. Has been prescribed estrogen cream    Has taken cipro in the past, but usually has ice cream every evening. No one has discussed dietary constraints with quinolones          Past Medical History:   Diagnosis Date    Bowen's disease     on buttock    Chronic back pain     Chronic kidney disease     COPD (chronic obstructive pulmonary disease) ([address]     Irritable bowel syndrome with constipation 1/27/2017    Lumbosacral spondylosis without myelopathy- Dr. Zak Wells Osteopenia     Sciatica     Venous insufficiency        Past Surgical History:   Procedure Laterality Date    COLONOSCOPY      CYSTOSCOPY N/A 2014    x2    FOREIGN BODY REMOVAL Right 04/21/15    PARTIAL, GROIN    HERNIA REPAIR      double hernia oct.  2 2013    HYSTERECTOMY  7/14/11    bso Georga Nettle    KIDNEY STONE SURGERY      x2    OTHER SURGICAL HISTORY      laser surgery facundo dx    RI COLON CA SCRN NOT HI RSK IND N/A 4/19/2017    COLONOSCOPY performed by Ash Moses MD at [facility] 10/19/2016    ANAL PROCTO SIGMOIDOSCOPY ciprofloxacin (CIPRO) 500 MG tablet Take 1 tablet by mouth 2 times daily for 10 days 20 tablet 0    midodrine (PROAMATINE) 2.5 MG tablet Take 1 tablet by mouth 2 times daily (with meals) 90 tablet 3    naloxone 4 MG/0.1ML LIQD nasal spray 1 spray by Nasal route as needed for Opioid Reversal (Patient not taking: Reported on 7/31/2020) 1 each 5    traMADol (ULTRAM) 50 MG tablet Take 2 tablets by mouth every 8 hours as needed for Pain for up to 92 days. TAKE 2 TABLETS BY MOUTH EVERY 8 HOURS AS NEEDED FOR PAIN 180 tablet 0    pregabalin (LYRICA) 75 MG capsule Take 1 capsule by mouth 3 times daily for 90 days. 270 capsule 0    albuterol sulfate  (90 Base) MCG/ACT inhaler Inhale 2 puffs into the lungs every 6 hours as needed for Wheezing 1 Inhaler 1    meloxicam (MOBIC) 15 MG tablet Take 1 tablet by mouth once daily 30 tablet 0    Masks (FACE MASK EARLOOP-STYLE) MISC 1 Device by Does not apply route once for 1 dose 1 each 0    albuterol sulfate HFA (PROAIR HFA) 108 (90 Base) MCG/ACT inhaler Inhale 2 puffs into the lungs every 6 hours as needed for Wheezing or Shortness of Breath 1 Inhaler 3    calcium-vitamin D (OSCAL 500/200 D-3) 500-200 MG-UNIT per tablet Take 1 tablet by mouth 2 times daily.  therapeutic multivitamin-minerals (THERAGRAN-M) tablet Take 1 tablet by mouth daily. No current facility-administered medications on file prior to visit. Since we cannot conduct an in-person exam, the following were addressed with the patient. I have asked the patient to assist in their exam:  Patient denies any general new issues. Patient does not observe any new skin rashes or ulcers. Patient does not perceive any new visual deficits  Patient does not feel like they are \"clammy\" or sweating  Patient denies any dry mouth or sore mouth and is able to flex and extend her neck with ease. Patient can inhale and exhale without any difficulty and feels like their chest is expanding symmetrically. They do not feel short of breath or any distress when asked to move around. No pain with expansion of the chest  Patient is able to feel their own pulse and agrees it is regular. Patient states their abdomen is not protruberant beyond their normal size. States that there is no pain when touching the area beneath the sternum when directed, or the left or the right side of the abdomen. They feel no pain when asked to press on the suprapubic area or the right or left flank. Patient denies any pain when asked to squeeze both upper legs and lower legs anteriorly or posteriorly. They do not see any new swelling of their joints. No observed neurological changes or slurred speech when speaking to the patient      Labs: I have reviewed all lab results by electronic record, including most recent CBC, metabolic panel, and pertinent abnormalities were addressed from an infectious disease perspective. WBC trends are being monitored. Lab Results   Component Value Date     07/27/2020    K 3.4 07/27/2020     07/27/2020    CO2 25 07/27/2020    BUN 13 07/27/2020    CREATININE 0.55 07/27/2020    GLUCOSE 138 07/27/2020    CALCIUM 8.2 07/27/2020      Lab Results   Component Value Date    WBC 6.4 07/27/2020    HGB 8.8 (L) 07/27/2020    HCT 26.4 (L) 07/27/2020    MCV 92.4 07/27/2020     07/27/2020       Radiology:   I have reviewed imaging results per electronic record and most pertinent abnormalities are being addressed from an infectious disease standpoint. Assessment:  Recurrent UTIs - 5/2 with E faecalis  Multiple allergies to meds. dysuria  Perineal and perianal lesions with path + Bowen's Disease. Plan:  zyvox x 3 days - patient requests to send it to [facility]. Patient instructed to STOP tramadol while on the Zyvox, also reiterated with the daughter. Caution with use of Midodrine - monitor for fevers or headache also discussed. She is to finish the course of cipro and stop.  Dietary constraints also reviewed. Discussed the above with the patient then again with the daughter who was upset bc the mother had not had relief. Message left with office of Dr Aubrey Pedro regarding possible plan regarding her spasms and recurrent symptoms. Discussed cranberry supplement - states that she has bottles of cranberry pills at home. She is also on D-mannose. Discussed warm water rinses after bowel movements and even after urination. States that she takes metamucil for constipation    We will follow up in 2 weeks. Patient counseled regarding COVID-19 precautions, including but not limited to social distancing, handwashing, and maintaining safe distance of 6ft. Other individual concerns regarding COVID-19 were also addressed to the best of my ability using current recommendations as provided by the [address], CDC, and WHO. Signs and symptoms of possible infection were also discussed as well as what it means to self-quarantine in the event of mild symptoms.        Apple Swain D.O.

## 2020-08-10 NOTE — TELEPHONE ENCOUNTER
Because the urine culture is negative, she may have a vaginal infection or irritation. Is there any discharge or redness to the vaginal area?

## 2020-08-13 ENCOUNTER — TELEPHONE (OUTPATIENT)
Dept: INFECTIOUS DISEASES | Age: 65
End: 2020-08-13

## 2020-08-13 NOTE — TELEPHONE ENCOUNTER
Late entry> Per Dr Farrukh Cummings, patient is to Follow up with Urologist, U/a Cx is negative. Consulted with her on Friday, 8-7-20. Found patient was given an Rx of Cipro by PCP on 7/31/20 for 10 days. Unknown if she took and completed. Patient has since been seen via virtual visit on Mon. 8-10-20 with ID physician Dr Loida Figueroa.   Notes Faxed to Urologist.

## 2020-08-13 NOTE — TELEPHONE ENCOUNTER
Patient has C/o continued Bladder spams. Has taken course of Abx of Zyvox. Wants to know if Urologist has been in contact with ID and what is the plan. She has an appt with her PCP, Caitlyn Lund CNP, tomorrow, 8-14-20. Will inquire with Dr Jesus Lombardi and Fax over ID OV Note to Dr Lela Camp.

## 2020-08-14 ENCOUNTER — VIRTUAL VISIT (OUTPATIENT)
Dept: FAMILY MEDICINE CLINIC | Age: 65
End: 2020-08-14
Payer: MEDICARE

## 2020-08-14 PROCEDURE — 99213 OFFICE O/P EST LOW 20 MIN: CPT | Performed by: NURSE PRACTITIONER

## 2020-08-14 RX ORDER — OXYBUTYNIN CHLORIDE 5 MG/1
5 TABLET ORAL 3 TIMES DAILY
Qty: 12 TABLET | Refills: 0 | Status: SHIPPED | OUTPATIENT
Start: 2020-08-14 | End: 2020-10-07

## 2020-08-14 RX ORDER — OXYBUTYNIN CHLORIDE 10 MG/1
10 TABLET, EXTENDED RELEASE ORAL DAILY
Qty: 30 TABLET | Refills: 3 | Status: CANCELLED | OUTPATIENT
Start: 2020-08-14

## 2020-08-14 NOTE — PROGRESS NOTES
2020    TELEHEALTH EVALUATION -- Audio/Visual (During FDBZD-92 public health emergency)    Due to [place] 19 outbreak, patient's office visit was converted to a virtual visit. Patient was contacted and agreed to proceed with a virtual visit via Doxy. me  The risks and benefits of converting to a virtual visit were discussed in light of the current infectious disease epidemic. Patient also understood that insurance coverage and co-pays are up to their individual insurance plans. Jaxon Jarvis is a 59 y.o. female being evaluated by a Virtual Visit (video visit) encounter to address concerns as mentioned above. A caregiver was present when appropriate. Due to this being a TeleHealth encounter (During WPJJF-90 public health emergency), evaluation of the following organ systems was limited: Vitals/Constitutional/EENT/Resp/CV/GI//MS/Neuro/Skin/Heme-Lymph-Imm. Pursuant to the emergency declaration under the [address] [place] [address] and the [employer] and [employer] Act, this Virtual Visit was conducted with patient's (and/or legal guardian's) consent, to reduce the patient's risk of exposure to COVID-19 and provide necessary medical care. The patient (and/or legal guardian) has also been advised to contact this office for worsening conditions or problems, and seek emergency medical treatment and/or call 911 if deemed necessary. Patient identification was verified at the start of the visit: Yes    Total time spent for this encounter: Not billed by time    Services were provided through a video synchronous discussion virtually to substitute for in-person clinic visit. Patient and provider were located at their individual homes.       HPI:    Jaxon Jarvis (:  1955) has requested an audio/video evaluation for the following concern(s):    Bladder spasm: She states that she has been having significant bladder spasms over the past several days. She recently followed with infectious disease specialist who gave her a few days worth of an antibiotic which seemed to help some. She had to stop her Ultram though to take the antibiotic and she suffered significant withdrawal symptoms. Also stated that her pain was intolerable when she was off of the medication. There have been calls placed to her urologist but she has not heard back yet. She is also requested that infectious disease specialist contact her urologist as well. She had been taking oxybutynin for symptoms but urologist was unaware that she was taking Lyrica and then she developed significant hypotension and syncope. The oxybutynin helped a lot with her bladder discomfort. She states that the Lyrica has not really helping her discomfort at all anyway and wonders if she could stop it. She states that she suddenly stopped gabapentin in the past with no withdrawal symptoms. Her daughter who was present for video virtual visit today states her concerned that her mother's discomfort has not been treated adequately but she is also worried about mixing medications again and potentially causing more harm. The patient states that her blood pressure has been normal lately and she has not had any lightheadedness or dizziness. Prior to Visit Medications    Medication Sig Taking?  Authorizing Provider   oxybutynin (DITROPAN) 5 MG tablet Take 1 tablet by mouth 3 times daily Yes LINSEY Hope - CNP   fluconazole (DIFLUCAN) 150 MG tablet Take 1 tablet by mouth every other day  LINSEY Branham - CNP   midodrine (PROAMATINE) 2.5 MG tablet Take 1 tablet by mouth 2 times daily (with meals)  Deniz Winter MD   naloxone 4 MG/0.1ML LIQD nasal spray 1 spray by Nasal route as needed for Opioid Reversal  Patient not taking: Reported on 7/31/2020  Francisco Worthy MD   traMADol (ULTRAM) 50 MG tablet Take 2 tablets by mouth every 8 hours as needed for Pain for up to 92 days. TAKE 2 TABLETS BY MOUTH EVERY 8 HOURS AS NEEDED FOR PAIN  LINSEY Hope CNP   albuterol sulfate  (90 Base) MCG/ACT inhaler Inhale 2 puffs into the lungs every 6 hours as needed for Wheezing  LINSEY Hope CNP   meloxicam (MOBIC) 15 MG tablet Take 1 tablet by mouth once daily  LINSEY Hope CNP   Masks (FACE MASK EARLOOP-STYLE) MISC 1 Device by Does not apply route once for 1 dose  LINSEY Hope CNP   albuterol sulfate HFA (PROAIR HFA) 108 (90 Base) MCG/ACT inhaler Inhale 2 puffs into the lungs every 6 hours as needed for Wheezing or Shortness of Breath  LINSEY Hope CNP   calcium-vitamin D (OSCAL 500/200 D-3) 500-200 MG-UNIT per tablet Take 1 tablet by mouth 2 times daily. Historical Provider, MD   therapeutic multivitamin-minerals (THERAGRAN-M) tablet Take 1 tablet by mouth daily. Historical Provider, MD       Social History     Tobacco Use    Smoking status: Current Every Day Smoker     Packs/day: 0.50     Years: 20.00     Pack years: 10.00     Types: Cigarettes    Smokeless tobacco: Never Used    Tobacco comment: relaxes me   Substance Use Topics    Alcohol use:  Yes     Alcohol/week: 5.0 standard drinks     Types: 5 Cans of beer per week     Comment: social    Drug use: No       PHYSICAL EXAMINATION:  [ INSTRUCTIONS:  \"[x]\" Indicates a positive item  \"[]\" Indicates a negative item  -- DELETE ALL ITEMS NOT EXAMINED]  [x] Alert  [x] Oriented to person/place/time    [x] No apparent distress  [] Toxic appearing    [] Face flushed appearing [] Sclera clear  [] Lips are cyanotic      [x] Breathing appears normal  [] Appears tachypneic      [] Rash on visible skin    [x] Cranial Nerves II-XII grossly intact    [x] Motor grossly intact in visible upper extremities    [] Motor grossly intact in visible lower extremities    [x] Normal Mood  [] Anxious appearing    [] Depressed appearing  [] Confused appearing      [] Poor short term memory  [] Poor long term memory    [] OTHER:      Due to this being a TeleHealth encounter, evaluation of the following organ systems is limited: Vitals/Constitutional/EENT/Resp/CV/GI//MS/Neuro/Skin/Heme-Lymph-Imm. ASSESSMENT/PLAN:   Diagnosis Orders   1. Bladder spasm  oxybutynin (DITROPAN) 5 MG tablet   2. Chronic pelvic pain in female           Return in about 2 months (around 10/14/2020) for review medication. Discussed that I will contact her pharmacist to determine if there are any significant medication interactions between oxybutynin and Ultram.    My research shows that there is a contraindication with Lyrica. We discussed recommendation to wean Lyrica but the patient states that she would like to stop it suddenly so that she can start the oxybutynin sooner than later. Discussed the risk associated with this but she would like to go ahead with it. I did contact Lu Darby the pharmacist at [facility] and after looking into it, she tells me that there is no known interaction between tramadol and oxybutynin that would prevent them from being prescribed together. I also asked what dose and frequency she had been taking before and it was reported to me that she was on oxybutynin 5 mg 3 times a day. I contacted the patient again and told her as well as her daughter that I spoken to the pharmacist and that it is okay to take the tramadol with the oxybutynin. She is encouraged to change positions slowly over the weekend and rest if dizziness occurs. Quantity given for the weekend and she is encouraged to touch base with her urologist on Monday. Go to ER if hypotension occurs with symptoms. She is aware of need to stop the oxybutynin should this occur. Please note this report has been partially produced using speech recognition software and may cause contain errors related to that system including grammar, punctuation and spelling as well as words and phrases that may seem inappropriate.  If there are questions or concerns please feel free to contact me to clarify. An  electronic signature was used to authenticate this note. --LINSEY Alex - CNP on 8/14/2020 at 4:33 PM        Pursuant to the emergency declaration under the [facility], [facility] waiver authority and the [employer] and [employer] Act, this Virtual  Visit was conducted, with patient's consent, to reduce the patient's risk of exposure to COVID-19 and provide continuity of care for an established patient. Services were provided through a video synchronous discussion virtually to substitute for in-person clinic visit.

## 2020-08-17 ENCOUNTER — TELEPHONE (OUTPATIENT)
Dept: FAMILY MEDICINE CLINIC | Age: 65
End: 2020-08-17

## 2020-08-17 NOTE — TELEPHONE ENCOUNTER
Patient called and stated her Urologist is not getting back to her. She would like something to help with the burning.

## 2020-08-17 NOTE — TELEPHONE ENCOUNTER
Patient called to state she is unable to take the oxybutynin chloride. She states it causes nausea and a little bit of dizziness.

## 2020-08-20 RX ORDER — FLUCONAZOLE 150 MG/1
150 TABLET ORAL DAILY
Qty: 7 TABLET | Refills: 0 | Status: SHIPPED | OUTPATIENT
Start: 2020-08-20 | End: 2020-09-29 | Stop reason: SDUPTHER

## 2020-08-21 ENCOUNTER — HOSPITAL ENCOUNTER (OUTPATIENT)
Dept: ORTHOPEDIC SURGERY | Age: 65
Discharge: HOME | End: 2020-08-23
Payer: MEDICARE

## 2020-08-21 ENCOUNTER — OFFICE VISIT (OUTPATIENT)
Dept: ORTHOPEDIC SURGERY | Age: 65
End: 2020-08-21

## 2020-08-21 VITALS
TEMPERATURE: 96.8 F | HEIGHT: 65 IN | BODY MASS INDEX: 23.32 KG/M2 | WEIGHT: 140 LBS | OXYGEN SATURATION: 97 % | HEART RATE: 86 BPM

## 2020-08-21 PROCEDURE — 73030 X-RAY EXAM OF SHOULDER: CPT | Performed by: ORTHOPAEDIC SURGERY

## 2020-08-21 PROCEDURE — 73030 X-RAY EXAM OF SHOULDER: CPT

## 2020-08-21 PROCEDURE — 99024 POSTOP FOLLOW-UP VISIT: CPT | Performed by: PHYSICIAN ASSISTANT

## 2020-08-21 ASSESSMENT — ENCOUNTER SYMPTOMS
SHORTNESS OF BREATH: 0
VOMITING: 0
BACK PAIN: 0
CONSTIPATION: 0
COLOR CHANGE: 0
WHEEZING: 0
NAUSEA: 0
DIARRHEA: 0
STRIDOR: 0

## 2020-08-21 NOTE — PROGRESS NOTES
[facility]      Subjective:      Chief Complaint   Patient presents with    Follow-up     2 week f/u for 2-part disp fx of surgical neck of right humerus, pt states she is having pain and discomfort, pt rates pain 9/10 on pain scale, pt states pain is worse at night. pt states she has been wearing sling all the time. HPI: Preston Galarza is a RHD 59 y.o. female who is in a shoulder immobilizer for 2 part humeral neck fracture, right side. She missed her last appointment for reason I am not sure, has not been doing any wrist or elbow range of motion. I told her the importance of this. Also talked about pendulum exercises. 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 Kowalski Brothers Osteopenia     Sciatica     Venous insufficiency       Past Surgical History:   Procedure Laterality Date    COLONOSCOPY      CYSTOSCOPY N/A 2014    x2    FOREIGN BODY REMOVAL Right 04/21/15    PARTIAL, GROIN    HERNIA REPAIR      double hernia oct.  2 2013    HYSTERECTOMY  7/14/11    bso Emily [facility]mine    KIDNEY STONE SURGERY      x2    OTHER SURGICAL HISTORY      laser surgery facundo dx    NC COLON CA SCRN NOT HI RSK IND N/A 4/19/2017    COLONOSCOPY performed by Amberly Robin MD at [address] N/A 10/19/2016    ANAL PROCTO SIGMOIDOSCOPY RIGID / excision perineal nodule performed by Selena Levi MD at [address] [place] [address] History     Socioeconomic History    Marital status:      Spouse name: Not on file    Number of children: Not on file    Years of education: Not on file    Highest education level: Not on file Occupational History    Occupation: [employer]   Social Needs    Financial resource strain: Not on file    Food insecurity     Worry: Not on file     Inability: Not on file   [employer] needs     Medical: Not on file     Non-medical: Not on file   Tobacco Use    Smoking status: Current Every Day Smoker     Packs/day: 0.50     Years: 20.00     Pack years: 10.00     Types: Cigarettes    Smokeless tobacco: Never Used    Tobacco comment: relaxes me   Substance and Sexual Activity    Alcohol use:  Yes     Alcohol/week: 5.0 standard drinks     Types: 5 Cans of beer per week     Comment: social    Drug use: No    Sexual activity: Yes     Partners: Male   Lifestyle    Physical activity     Days per week: Not on file     Minutes per session: Not on file    Stress: Not on file   Relationships    Social connections     Talks on phone: Not on file     Gets together: Not on file     Attends [employer] service: Not on file     Active member of club or organization: Not on file     Attends meetings of clubs or organizations: Not on file     Relationship status: Not on file    Intimate partner violence     Fear of current or ex partner: Not on file     Emotionally abused: Not on file     Physically abused: Not on file     Forced sexual activity: Not on file   Other Topics Concern    Not on file   Social History Narrative    ** Merged History Encounter **          Family History   Problem Relation Age of Onset    Heart Disease Mother     Other Mother         dementia    Arthritis Father      Allergies   Allergen Reactions    Macrobid [Nitrofurantoin Monohydrate Macrocrystals] Swelling    Nitrofurantoin Swelling    Pcn [Penicillins] Swelling     Rash and swelling of tongue c hospitalization yrs ago; no resp distress     Current Outpatient Medications on File Prior to Visit   Medication Sig Dispense Refill    fluconazole (DIFLUCAN) 150 MG tablet Take 1 tablet by mouth daily for 7 doses 7 tablet 0    fluconazole light-headedness. Objective:   Pulse 86   Temp 96.8 °F (36 °C) (Temporal)   Ht 5' 5\" (1.651 m)   Wt 140 lb (63.5 kg)   SpO2 97%   BMI 23.30 kg/m²     General: WDWN, well appearing, in no distress   Skin: without breakdown, rash, normal in color    Ortho Exam  Deferred as patient is in sling with known fracture, decent range of motion with elbow and wrist.    Radiographs and Laboratory Studies:     Diagnostic Imaging Studies:    Xr Shoulder Right (min 2 Views)    Result Date: 8/21/2020  2 views of the right shoulder were obtained today. These demonstrate a proximal humerus fracture in acceptable alignment. There is bridging callus formation. Laboratory Studies:   Lab Results   Component Value Date    WBC 6.4 07/27/2020    HGB 8.8 (L) 07/27/2020    HCT 26.4 (L) 07/27/2020    MCV 92.4 07/27/2020     07/27/2020     No results found for: SEDRATE  No results found for: CRP    Assessment and Plan:      Diagnosis Orders   1. 2-part disp fx of surgical neck of right humerus, init         Continue doing pendulum wrist and elbow range of motion exercises. Continue with sling at all times. Plan to take out for those exercises. She can start therapy in 2 weeks, see her back in 6 wks for another x-ray. The above plan was discussed in thorough detail with Dr. Karis Fink and the patient. No orders of the defined types were placed in this encounter. No orders of the defined types were placed in this encounter. No follow-ups on file.     Rob Aviles PA-C  [facility]  [phone]

## 2020-08-25 ENCOUNTER — TELEPHONE (OUTPATIENT)
Dept: FAMILY MEDICINE CLINIC | Age: 65
End: 2020-08-25

## 2020-08-25 NOTE — TELEPHONE ENCOUNTER
Patient would like something other than lyrica for her nerve pain as she states it doesn't seem to be working.

## 2020-08-25 NOTE — TELEPHONE ENCOUNTER
Please advise patient that Maria Esther Dunn is out of the office for the week. She will discuss further long term pain control options with her once she returns. Please hold this message and send back to Maria Esther Dunn once she returns to office.

## 2020-08-26 ENCOUNTER — VIRTUAL VISIT (OUTPATIENT)
Dept: CARDIOLOGY CLINIC | Age: 65
End: 2020-08-26
Payer: MEDICARE

## 2020-08-26 PROCEDURE — 99213 OFFICE O/P EST LOW 20 MIN: CPT | Performed by: INTERNAL MEDICINE

## 2020-08-26 NOTE — PROGRESS NOTES
2020    TELEHEALTH EVALUATION -- Audio/Visual (During NBGWT-80 public health emergency)    Due to [place] 19 outbreak, patient's office visit was converted to a virtual visit. Patient was contacted and agreed to proceed with a virtual visit via Doxy. me  The risks and benefits of converting to a virtual visit were discussed in light of the current infectious disease epidemic. Patient also understood that insurance coverage and co-pays are up to their individual insurance plans. HPI:    Марина Villalobos (:  1955) has requested an audio/video evaluation for the following concern(s):    Consult for syncope    BP back to normal on midodrine. Review of Systems   Constitutional: Negative for activity change and appetite change. HENT: Negative for congestion. Respiratory: Negative for apnea, choking and chest tightness. Cardiovascular: Negative for chest pain. Gastrointestinal: Negative for abdominal distention and abdominal pain. Endocrine: Negative for cold intolerance and heat intolerance. Genitourinary: Negative for dysuria and enuresis. Musculoskeletal: Negative for arthralgias and back pain. Skin: Negative for color change. Neurological: Negative for dizziness, seizures, syncope and light-headedness. Psychiatric/Behavioral: Negative for agitation, behavioral problems and confusion. Prior to Visit Medications    Medication Sig Taking?  Authorizing Provider   oxybutynin (DITROPAN) 5 MG tablet Take 1 tablet by mouth 3 times daily  LINSEY Pleitez CNP   midodrine (PROAMATINE) 2.5 MG tablet Take 1 tablet by mouth 2 times daily (with meals)  Rylie Caban MD   naloxone 4 MG/0.1ML LIQD nasal spray 1 spray by Nasal route as needed for Opioid Reversal  Dede Jimenez MD   albuterol sulfate  (90 Base) MCG/ACT inhaler Inhale 2 puffs into the lungs every 6 hours as needed for Wheezing  LINSEY Pleitez CNP   meloxicam (MOBIC) 15 MG tablet Take 1 tablet by mouth 10/26/2020). An  electronic signature was used to authenticate this note. --Alex Wang MD on 9/27/2020 at 8:43 PM        Pursuant to the emergency declaration under the [address] [place] [address], [facility] waiver authority and the [employer] Act, this Virtual  Visit was conducted, with patient's consent, to reduce the patient's risk of exposure to COVID-19 and provide continuity of care for an established patient. Services were provided through a video synchronous discussion virtually to substitute for in-person clinic visit.

## 2020-08-31 ENCOUNTER — TELEPHONE (OUTPATIENT)
Dept: FAMILY MEDICINE CLINIC | Age: 65
End: 2020-08-31

## 2020-08-31 ENCOUNTER — VIRTUAL VISIT (OUTPATIENT)
Dept: INFECTIOUS DISEASES | Age: 65
End: 2020-08-31
Payer: MEDICARE

## 2020-08-31 PROCEDURE — 99214 OFFICE O/P EST MOD 30 MIN: CPT | Performed by: INTERNAL MEDICINE

## 2020-08-31 NOTE — PROGRESS NOTES
2020    TELEHEALTH EVALUATION -- Audio/Visual (During SOLJZ-75 public health emergency)      Tram Mayes (:  1955) has requested an audio/video evaluation for the following concern(s):    UTI     Due to the COVID-19 outbreak, patient's office visit was converted to a virtual visit. Patient was contacted and agreed to proceed with a virtual visit with me via Doxy. me with my location at the office. Patient reports that they are located at home. The risks and benefits of converting to a virtual visit were discussed in light of the current infectious disease epidemic. Services were provided through an audio/video synchronous discussion virtually to substitute for in person clinic visit. THIS virtual visit was conducted via interactive/real-time audio/video. Due to this being a TeleHealth encounter, evaluation of the following organ systems is limited: Vitals/Constitutional/EENT/Resp/CV/GI//MS/Neuro/Skin/Heme-Lymph-Imm.}    Pursuant to the emergency declaration under the [address] [place] [address], [facility], [facility] waiver authority and the [employer] and [employer] Act, this Virtual Visit was conducted, with patient's consent, to reduce the patient's risk of exposure to COVID-19 and provide care for the patient. Tram Mayes  1955  female  Medical Record Number: [id number]    Patient informed that I am an Infectious Disease physician and permission obtained from the patient to speak in front of any visitors prior to any discussion for HIPPA purposes. HPI:  Patient with hx of Bowens disease, presenting for dysuria and UTIs.     2020 - UA + LE and cloudy urine. Nitrites negative, UC negative.  (Currently on Cipro but still having burning, frequency, spasms, and pain.)  2020 - citrobacter and e faecalis ( treated with Cipro but symptoms still not relieved )   2020 - Enterococcus faecalis ( relieved of  symptoms with tx with zyvox x 5 SIGMOIDOSCOPY N/A 10/19/2016    ANAL PROCTO SIGMOIDOSCOPY RIGID / excision perineal nodule performed by Lucretia Pickering MD at [address]         Social History     Socioeconomic History    Marital status:      Spouse name: Not on file    Number of children: Not on file    Years of education: Not on file    Highest education level: Not on file   Occupational History    Occupation: [employer]   Social Needs    Financial resource strain: Not on file    Food insecurity     Worry: Not on file     Inability: Not on file   [employer] needs     Medical: Not on file     Non-medical: Not on file   Tobacco Use    Smoking status: Current Every Day Smoker     Packs/day: 0.50     Years: 20.00     Pack years: 10.00     Types: Cigarettes    Smokeless tobacco: Never Used    Tobacco comment: relaxes me   Substance and Sexual Activity    Alcohol use:  Yes     Alcohol/week: 5.0 standard drinks     Types: 5 Cans of beer per week     Comment: social    Drug use: No    Sexual activity: Yes     Partners: Male   Lifestyle    Physical activity     Days per week: Not on file     Minutes per session: Not on file    Stress: Not on file   Relationships    Social connections     Talks on phone: Not on file     Gets together: Not on file     Attends Temple service: Not on file     Active member of club or organization: Not on file     Attends meetings of clubs or organizations: Not on file     Relationship status: Not on file    Intimate partner violence     Fear of current or ex partner: Not on file     Emotionally abused: Not on file     Physically abused: Not on file     Forced sexual activity: Not on file   Other Topics Concern    Not on file   Social History Narrative    ** Merged History Encounter **            Family History   Problem Relation Age of Onset    Heart Disease Mother     Other Mother         dementia    Arthritis Father        Current Outpatient Medications on File Prior to Visit   Medication Sig Dispense Refill    oxybutynin (DITROPAN) 5 MG tablet Take 1 tablet by mouth 3 times daily (Patient not taking: Reported on 8/21/2020) 12 tablet 0    fluconazole (DIFLUCAN) 150 MG tablet Take 1 tablet by mouth every other day 5 tablet 0    midodrine (PROAMATINE) 2.5 MG tablet Take 1 tablet by mouth 2 times daily (with meals) 90 tablet 3    naloxone 4 MG/0.1ML LIQD nasal spray 1 spray by Nasal route as needed for Opioid Reversal 1 each 5    traMADol (ULTRAM) 50 MG tablet Take 2 tablets by mouth every 8 hours as needed for Pain for up to 92 days. TAKE 2 TABLETS BY MOUTH EVERY 8 HOURS AS NEEDED FOR PAIN (Patient not taking: Reported on 8/21/2020) 180 tablet 0    albuterol sulfate  (90 Base) MCG/ACT inhaler Inhale 2 puffs into the lungs every 6 hours as needed for Wheezing 1 Inhaler 1    meloxicam (MOBIC) 15 MG tablet Take 1 tablet by mouth once daily (Patient not taking: Reported on 8/21/2020) 30 tablet 0    Masks (FACE MASK EARLOOP-STYLE) MISC 1 Device by Does not apply route once for 1 dose 1 each 0    albuterol sulfate HFA (PROAIR HFA) 108 (90 Base) MCG/ACT inhaler Inhale 2 puffs into the lungs every 6 hours as needed for Wheezing or Shortness of Breath 1 Inhaler 3    calcium-vitamin D (OSCAL 500/200 D-3) 500-200 MG-UNIT per tablet Take 1 tablet by mouth 2 times daily.  therapeutic multivitamin-minerals (THERAGRAN-M) tablet Take 1 tablet by mouth daily. No current facility-administered medications on file prior to visit. Since we cannot conduct an in-person exam, the following were addressed with the patient. I have asked the patient to assist in their exam:  Patient denies any general new issues. Patient does not observe any new skin rashes or ulcers.    Patient does not perceive any new visual deficits  Patient does not feel like they are \"clammy\" or sweating  Patient denies any dry mouth or sore mouth and is able to flex and extend her neck with ease.   Patient can inhale and exhale without any difficulty and feels like their chest is expanding symmetrically. They do not feel short of breath or any distress when asked to move around. No pain with expansion of the chest  Patient is able to feel their own pulse and agrees it is regular. Patient states their abdomen is not protruberant beyond their normal size. States that there is no pain when touching the area beneath the sternum when directed, or the left or the right side of the abdomen. They feel no pain when asked to press on the suprapubic area or the right or left flank. Patient denies any pain when asked to squeeze both upper legs and lower legs anteriorly or posteriorly. They do not see any new swelling of their joints. No observed neurological changes or slurred speech when speaking to the patient      Labs: I have reviewed all lab results by electronic record, including most recent CBC, metabolic panel, and pertinent abnormalities were addressed from an infectious disease perspective. WBC trends are being monitored. Lab Results   Component Value Date     07/27/2020    K 3.4 07/27/2020     07/27/2020    CO2 25 07/27/2020    BUN 13 07/27/2020    CREATININE 0.55 07/27/2020    GLUCOSE 138 07/27/2020    CALCIUM 8.2 07/27/2020      Lab Results   Component Value Date    WBC 6.4 07/27/2020    HGB 8.8 (L) 07/27/2020    HCT 26.4 (L) 07/27/2020    MCV 92.4 07/27/2020     07/27/2020       Radiology:   I have reviewed imaging results per electronic record and most pertinent abnormalities are being addressed from an infectious disease standpoint. Assessment:  Recurrent UTIs - 5/2 with E faecalis  Multiple allergies to meds. dysuria  Perineal and perianal lesions with path + Bowen's Disease. Plan:  No more abx. She is off of her trammadol and states that she is withdrawing. She is being treated with two medications for her bladder spasms and feels well.  Her daughter is present for the exam.   They do not want another UA/UC. Follow up as needed. Message left with office of Dr Helen Monique regarding possible plan regarding her spasms and recurrent symptoms. Feels overall better with the medications for her bladder spasms    Patient counseled regarding COVID-19 precautions, including but not limited to social distancing, handwashing, and maintaining safe distance of 6ft. Other individual concerns regarding COVID-19 were also addressed to the best of my ability using current recommendations as provided by the [address], CDC, and WHO. Signs and symptoms of possible infection were also discussed as well as what it means to self-quarantine in the event of mild symptoms.        Apple Swain D.O.

## 2020-08-31 NOTE — TELEPHONE ENCOUNTER
I have her taking the ultram only. Lyrica was discontinued secondary to medication interaction/side effects. Her medication list is very unclear. Is she not taking the Ultram as well?

## 2020-08-31 NOTE — TELEPHONE ENCOUNTER
Patient called in requesting her Lyrica dosage be upped or changed to a stronger medication as the current dose of Lyrica is not helping.  She also wants to let you know she is having bladder spasams and she wants you to know she is also off tramadol Please advise

## 2020-09-02 NOTE — TELEPHONE ENCOUNTER
Patient states she is not having any side effects from the Lyrica. She does not understand why she was taken off of the Lyrica. She states she was told she is not suppose to take the uribel and tramadol so she stopped taking the tramadol. Please advise.

## 2020-09-02 NOTE — TELEPHONE ENCOUNTER
Just want to be sure this was received by Gilles Peres as Dr Ricarda Fleming had already responded to it once.

## 2020-09-03 NOTE — TELEPHONE ENCOUNTER
I am not able to do anything stronger than what she is taking. I recommend a referral to pain management specialist for further evaluation/treatment. Does she have a preference for provider?

## 2020-09-03 NOTE — TELEPHONE ENCOUNTER
Emaline  there is an interaction between the Ditropan / oxybutynin and the Lyrica which is why I recommended stopping the Lyrica at her last virtual visit. This was due to recent fall/dizziness and her daughter's concern for polypharmacy with side effects. With her recent issues and severity of pain, a referral to pain management would be my best recommendation for her. 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

## 2020-09-04 ENCOUNTER — TELEPHONE (OUTPATIENT)
Dept: FAMILY MEDICINE CLINIC | Age: 65
End: 2020-09-04

## 2020-09-04 DIAGNOSIS — G89.29 CHRONIC PELVIC PAIN IN FEMALE: Primary | ICD-10-CM

## 2020-09-04 DIAGNOSIS — R10.2 CHRONIC PELVIC PAIN IN FEMALE: Primary | ICD-10-CM

## 2020-09-04 NOTE — TELEPHONE ENCOUNTER
Patient is calling in today she is having bladder spasms she didn't sleep last night due to this.  She states her urologist is not getting back to her and would like you to prescribe her something   She wants to know she is no longer taking tramadol but she is taking lyrica please advise

## 2020-09-04 NOTE — TELEPHONE ENCOUNTER
Outside of the pyridium, I am not sure what would help her chronic bladder spasms.      What has her urologist recommended in the past?

## 2020-09-08 ENCOUNTER — APPOINTMENT (OUTPATIENT)
Dept: PHYSICAL THERAPY | Age: 65
End: 2020-09-08
Payer: MEDICARE

## 2020-09-08 NOTE — TELEPHONE ENCOUNTER
1st attempt to reach patient. Called patient @ [phone] (H)   and left message on machine for patient to return call during normal business hours of 8:30 AM and 5 PM @ [phone] option 3.

## 2020-09-10 ENCOUNTER — TELEPHONE (OUTPATIENT)
Dept: INFECTIOUS DISEASES | Age: 65
End: 2020-09-10

## 2020-09-10 NOTE — TELEPHONE ENCOUNTER
Received call from patient C/o frequent urination, Burning sensation,No fever, No [place]. She request U/A and C&S again to make sure no infection.   I will consult

## 2020-09-11 NOTE — TELEPHONE ENCOUNTER
Per Dr Irma Brooke. Have patient submit a U/A specimen. Orders placed. Call to patient, she states she does have more pressure than usual, some chills at times. Patient states she is Not on any Abx at this time. She verbalized understanding to have urine checked and to provide a Clean midstream, she will go to [employer].

## 2020-09-11 NOTE — TELEPHONE ENCOUNTER
Patient states she is taking the ditropan, it was prescribed by her urologist and that she has an appointment with Dr. Radha Sarabia. She states she would like to speak with you next week because she has not been able to sleep and is available anytime.

## 2020-09-14 ENCOUNTER — TELEPHONE (OUTPATIENT)
Dept: FAMILY MEDICINE CLINIC | Age: 65
End: 2020-09-14

## 2020-09-14 NOTE — TELEPHONE ENCOUNTER
I would recommend that she go ahead and get the ultrasound done that was recommended. Was this test ordered by urology? Does she know the name of the ultrasound that was ordered?

## 2020-09-15 NOTE — TELEPHONE ENCOUNTER
Patient reports she has not taken anything for sleep.  I scheduled her Thursday with Dr Codi Gonzalez for the bladder spasms

## 2020-09-15 NOTE — TELEPHONE ENCOUNTER
Please have her set up a visit with next available provider for virtual visit to discuss the bladder spasms. Has she tried over the counter medication for sleep?

## 2020-09-17 ENCOUNTER — HOSPITAL ENCOUNTER (OUTPATIENT)
Age: 65
Setting detail: SPECIMEN
Discharge: HOME | End: 2020-09-17
Payer: MEDICARE

## 2020-09-17 ENCOUNTER — HOSPITAL ENCOUNTER (OUTPATIENT)
Dept: PHYSICAL THERAPY | Age: 65
Setting detail: THERAPIES SERIES
Discharge: HOME | End: 2020-09-17
Payer: MEDICARE

## 2020-09-17 ENCOUNTER — VIRTUAL VISIT (OUTPATIENT)
Dept: FAMILY MEDICINE CLINIC | Age: 65
End: 2020-09-17
Payer: MEDICARE

## 2020-09-17 LAB
BACTERIA: ABNORMAL /HPF
BILIRUBIN URINE: NEGATIVE
BLOOD, URINE: NEGATIVE
CLARITY: CLEAR
COLOR: YELLOW
EPITHELIAL CELLS, UA: ABNORMAL /HPF (ref 0–5)
GLUCOSE URINE: NEGATIVE MG/DL
HYALINE CASTS: ABNORMAL /HPF (ref 0–5)
KETONES, URINE: NEGATIVE MG/DL
LEUKOCYTE ESTERASE, URINE: ABNORMAL
NITRITE, URINE: NEGATIVE
PH UA: 6 (ref 5–9)
PROTEIN UA: NEGATIVE MG/DL
RBC UA: ABNORMAL /HPF (ref 0–5)
SPECIFIC GRAVITY UA: 1.01 (ref 1–1.03)
URINE REFLEX TO CULTURE: ABNORMAL
UROBILINOGEN, URINE: 0.2 E.U./DL
WBC UA: ABNORMAL /HPF (ref 0–5)

## 2020-09-17 PROCEDURE — 97162 PT EVAL MOD COMPLEX 30 MIN: CPT

## 2020-09-17 PROCEDURE — 81001 URINALYSIS AUTO W/SCOPE: CPT

## 2020-09-17 PROCEDURE — 97530 THERAPEUTIC ACTIVITIES: CPT

## 2020-09-17 PROCEDURE — 99442 PR PHYS/QHP TELEPHONE EVALUATION 11-20 MIN: CPT | Performed by: FAMILY MEDICINE

## 2020-09-17 PROCEDURE — 97110 THERAPEUTIC EXERCISES: CPT

## 2020-09-17 RX ORDER — METHENAMINE, SODIUM PHOSPHATE, MONOBASIC, MONOHYDRATE, PHENYL SALICYLATE, METHYLENE BLUE, AND HYOSCYAMINE SULFATE 120; 40.8; 36; 10; .12 MG/1; MG/1; MG/1; MG/1; MG/1
CAPSULE ORAL
COMMUNITY
Start: 2020-09-03 | End: 2020-09-17 | Stop reason: CLARIF

## 2020-09-17 ASSESSMENT — PAIN DESCRIPTION - LOCATION: LOCATION: ARM

## 2020-09-17 ASSESSMENT — PAIN SCALES - GENERAL: PAINLEVEL_OUTOF10: 5

## 2020-09-17 ASSESSMENT — PAIN DESCRIPTION - FREQUENCY: FREQUENCY: INTERMITTENT

## 2020-09-17 ASSESSMENT — PAIN DESCRIPTION - ORIENTATION: ORIENTATION: RIGHT

## 2020-09-17 ASSESSMENT — PAIN DESCRIPTION - DESCRIPTORS: DESCRIPTORS: ACHING

## 2020-09-17 ASSESSMENT — PAIN DESCRIPTION - PAIN TYPE: TYPE: ACUTE PAIN

## 2020-09-17 NOTE — PROGRESS NOTES
2020    TELEHEALTH EVALUATION -- Audio/Visual (During [place] [address] public health emergency)    Due to [place] 19 outbreak, patient's office visit was converted to a virtual visit. Patient was contacted and agreed to proceed with a virtual visit via Telephone Visit  The risks and benefits of converting to a virtual visit were discussed in light of the current infectious disease epidemic. Patient also understood that insurance coverage and co-pays are up to their individual insurance plans. Patient location: Home  Provider location: Mobile Laila Baker  HPI:    Fredkrzysztof Williams (:  1955) has requested an audio/video evaluation for the following concern(s):    Patient 60-year-old female presents today to follow-up on continued bladder spasms. She has a history of frequent UTIs and is followed by infectious disease and urology. She notices mild lower pelvic cramping and urgency. She has a follow-up urinalysis that she dropped off 2 days ago although there is no results in the system. SHe denies any fever, dysuria, or flank pain. This is been primarily worse over the last month but ongoing for over 1 year. Under the direction of urology she has increased her oxybutynin to twice daily which began yesterday. She does not know if that has had any positive effect yet    Review of Systems   Constitutional: Negative for activity change, appetite change and fatigue. Respiratory: Negative for apnea, cough, chest tightness and shortness of breath. Cardiovascular: Negative for chest pain, palpitations and leg swelling. Gastrointestinal: Negative for abdominal pain, blood in stool, constipation, diarrhea, nausea and vomiting. Genitourinary: Positive for urgency. Musculoskeletal: Negative for arthralgias. Neurological: Negative for seizures and headaches. Psychiatric/Behavioral: Negative for hallucinations and suicidal ideas. Prior to Visit Medications    Medication Sig Taking?  Authorizing Provider oxybutynin (DITROPAN) 5 MG tablet Take 1 tablet by mouth 3 times daily Yes LINSEY Montgomery CNP   midodrine (PROAMATINE) 2.5 MG tablet Take 1 tablet by mouth 2 times daily (with meals) Yes Ebony Gomes MD   naloxone 4 MG/0.1ML LIQD nasal spray 1 spray by Nasal route as needed for Opioid Reversal Yes Sb Burnette MD   albuterol sulfate  (90 Base) MCG/ACT inhaler Inhale 2 puffs into the lungs every 6 hours as needed for Wheezing Yes LINSEY Montgomery CNP   meloxicam (MOBIC) 15 MG tablet Take 1 tablet by mouth once daily Yes LINSEY Montgomery CNP   albuterol sulfate HFA (PROAIR HFA) 108 (90 Base) MCG/ACT inhaler Inhale 2 puffs into the lungs every 6 hours as needed for Wheezing or Shortness of Breath Yes LINSEY Montgomery CNP   calcium-vitamin D (OSCAL 500/200 D-3) 500-200 MG-UNIT per tablet Take 1 tablet by mouth 2 times daily. Yes Historical Provider, MD   therapeutic multivitamin-minerals (THERAGRAN-M) tablet Take 1 tablet by mouth daily. Yes Historical Provider, MD   Masks (FACE MASK EARLOOP-STYLE) MISC 1 Device by Does not apply route once for 1 dose  LINSEY Montgomery CNP       Social History     Tobacco Use    Smoking status: Current Every Day Smoker     Packs/day: 0.50     Years: 20.00     Pack years: 10.00     Types: Cigarettes    Smokeless tobacco: Never Used    Tobacco comment: relaxes me   Substance Use Topics    Alcohol use: Yes     Alcohol/week: 5.0 standard drinks     Types: 5 Cans of beer per week     Comment: social    Drug use: No            PHYSICAL EXAMINATION:  [x] Alert  [x] Oriented to person/place/time    [x] No apparent distress   [] OTHER: Limited due to telephone encounter    Due to this being a TeleHealth encounter, evaluation of the following organ systems is limited: Vitals/Constitutional/EENT/Resp/CV/GI//MS/Neuro/Skin/Heme-Lymph-Imm. ASSESSMENT/PLAN:  1. Bladder spasm  Continue oxybutynin twice daily per urology recommendations. Unclear at this time whether related to UTI versus chronic issues versus possible interstitial cystitis. Will advise patient to resubmit urine sample and follow-up on results. Return in about 1 month (around 10/17/2020), or if symptoms worsen or fail to improve, for chronic condtions with PCP. An  electronic signature was used to authenticate this note.    --Annie Jesus MD on 9/24/2020 at 7:55 AM    11-20 minutes were spent on the digital evaluation and management of this patient. 119}    Pursuant to the emergency declaration under the [facility], [facility] waiver authority and the [employer] and [employer] Act, this Virtual  Visit was conducted, with patient's consent, to reduce the patient's risk of exposure to COVID-19 and provide continuity of care for an established patient. Services were provided through a video synchronous discussion virtually to substitute for in-person clinic visit.

## 2020-09-17 NOTE — PROGRESS NOTES
Physical Therapy  Initial Assessment  Date: 2020  Patient Name: Eulogio Kramer  MRN: [id number]  : 1955     Treatment Diagnosis: 2-part displaced fracture of neck of right humerus s/p fall    Subjective   General  Chart Reviewed: Yes  Patient assessed for rehabilitation services?: Yes  Additional Pertinent Hx: Pt reports had a fall approx 7 weeks ago (Aug) pt fell and fractured her right shoulder/ Humeral neck fracture; No surgery performed and now here for PT  Family / Caregiver Present: Yes(pts exhusband)  Referring Practitioner: Dr. Leo Horowitz PATuckerC  Referral Date : 20  Diagnosis: Right humeral neck fracture s/p fall  Other (Comment): No restrictions listed on MD order;  Work on ROM and strengthening as tolerated  PT Visit Information  Onset Date: 20  Total # of Visits Approved: 12(1-2 weeks for 4-6 weeks=12)  Total # of Visits to Date: 1  Plan of Care/Certification Expiration Date: 10/16/20  No Show: 0  Canceled Appointment: 0  Subjective  Subjective: Pt reports pain is at her right elbow and the front of her right shoulder 5/10 \"achy\" pain  Pain Screening  Patient Currently in Pain: Yes  Pain Assessment  Pain Assessment: 0-10  Pain Level: 5  Pain Type: Acute pain  Pain Location: Arm  Pain Orientation: Right  Pain Descriptors: Aching  Pain Frequency: Intermittent  Vital Signs  Patient Currently in Pain: Yes    V     Objective          AROM RUE (degrees)  R Shoulder Flexion 0-180: 46 degrees in sitting  R Shoulder Extension 0-45: AROM 52 degrees in sitting  R Shoulder ABduction 0-180: AROM 46 degrees in sitting  R Shoulder Int Rotation  0-70: Able to cross in front of her body  R Shoulder Ext Rotation 0-90: AROM 35 degrees in sitting with elbow at her side  AROM LUE (degrees)  L Shoulder Flexion 0-180: 130 AROM in sitting  L Shoulder ABduction 0-180: 140 AROM in sitting  L Shoulder Ext Rotation  0-90: 50 degrees with elbow by her side    Strength RUE  R Shoulder Flexion: 3-/5  R Shoulder ABduction: 3/5  R Shoulder Internal Rotation: 3/5;3-/5  R Shoulder External Rotation: 3/5;3-/5  R Elbow Flexion: 3-/5;3/5  R Elbow Extension: 3/5  R Forearm Pron: 4/5  R Forearm Sup: 4/5  R Wrist Flexion: 4/5  R Wrist Extension: 4/5  Strength LUE  Comment: Grossly 4/5 L UE                            Exercises  Exercise 1: Table slides for R UE flexion x 5 H10  Exercise 2: Table slides for R UE ABD (with elbow bent) x 5 H10  Exercise 3: Wand ER x 5 H10                      Assessment   Conditions Requiring Skilled Therapeutic Intervention  Body structures, Functions, Activity limitations: Decreased functional mobility ; Decreased endurance;Decreased ADL status; Decreased ROM; Increased pain;Decreased strength  Assessment: Pt reports that she fell at home after taking some medication and she \"nearly passed out\". Pts fall resulted in a 2-part displaced fracture of the surgical neck of right humerus. Pt has been in a sling and has been allowed to do pendulums. PT completed evaluation and instructed pt in table slides and wand ther ex to work on increasing pts ROM. Pt given a copy of HEP and agreeable to PT POC to work on pain control, ROM and progress with strengthening as able.   Treatment Diagnosis: 2-part displaced fracture of neck of right humerus s/p fall  Prognosis: Good  Decision Making: Medium Complexity  REQUIRES PT FOLLOW UP: Yes         Plan   Plan  Times per week: 1-2 times per week for 4-6 weeks  Current Treatment Recommendations: Strengthening, Home Exercise Program, ROM, Manual Therapy - Soft Tissue Mobilization, Modalities, Pain Management  Plan Comment: KT tape, CP/HP            Goals  Short term goals  Time Frame for Short term goals: 1-2 weeks from date of eval on 9/17/20  Short term goal 1: Pt reports right shoulder pain as 3/10 or less with ADLS  Long term goals  Time Frame for Long term goals : 4-6 weeks from date of eval on 9/17/20  Long term goal 1: Pt reports right shoulder/R UE pain as 2/10 or less with overhead movements  Long term goal 2: Pt increase AROM of right shoulder as follows: R shoulder flex=140, abd=140 degrees and able to reach into right back pocket  Long term goal 3: Increase R UE strength to 4+/5 or > so that pt can use her R UE to perform household chores  Long term goal 4: Pt reports that she is able to tolerate laying on her right arm without waking due to pain  Long term goal 5: Indep with HEP  Patient Goals   Patient goals :  To decrease her right arm pain and be able to use her arm again       Therapy Time   Individual Concurrent Group Co-treatment   Time In  3:10pm         Time Out  4:15pm         Minutes  65 min                 SOLA Bowen#[id number]

## 2020-09-18 ENCOUNTER — TELEPHONE (OUTPATIENT)
Dept: ORTHOPEDIC SURGERY | Age: 65
End: 2020-09-18

## 2020-09-18 NOTE — TELEPHONE ENCOUNTER
Patient called into office today to cancel appointment, patient states she is having an allergic reaction to a medication she just started and feels dizzy. Patient wanted to schedule a VV with Dr. Dallin Villanueva, I advised patient that with visit today ALONDRA put a Xray order to be done today, so I would have to schedule another appt. For a f/u in office. Next avalable was Oct. 2, 2020, Patient scheduled for this date. Patient stated she did start PT and would like to discontinue her sling, Patient would like a phone call back before her next F/U appt. From Dr. Dallin Villanueva.

## 2020-09-21 ENCOUNTER — TELEPHONE (OUTPATIENT)
Dept: INFECTIOUS DISEASES | Age: 65
End: 2020-09-21

## 2020-09-24 ENCOUNTER — HOSPITAL ENCOUNTER (OUTPATIENT)
Dept: PHYSICAL THERAPY | Age: 65
Setting detail: THERAPIES SERIES
Discharge: HOME | End: 2020-09-24
Payer: MEDICARE

## 2020-09-24 ASSESSMENT — ENCOUNTER SYMPTOMS
BLOOD IN STOOL: 0
CHEST TIGHTNESS: 0
DIARRHEA: 0
NAUSEA: 0
VOMITING: 0
ABDOMINAL PAIN: 0
APNEA: 0
SHORTNESS OF BREATH: 0
COUGH: 0
CONSTIPATION: 0

## 2020-09-24 NOTE — PROGRESS NOTES
her R UE to perform household chores  Long term goal 4: Pt reports that she is able to tolerate laying on her right arm without waking due to pain  Long term goal 5: Indep with HEP  Patient Goals   Patient goals :  To decrease her right arm pain and be able to use her arm again    Plan:      Plan  Times per week: 1-2 times per week for 4-6 weeks  Current Treatment Recommendations: Strengthening, Home Exercise Program, ROM, Manual Therapy - Soft Tissue Mobilization, Modalities, Pain Management  Plan Comment: KT tape, CP/HP        Therapy Time   Individual Concurrent Group Co-treatment   Time In           Time Out           Minutes  0                 Barrtet Art PTA  License and [address] [address] Number: [id number]

## 2020-09-27 ASSESSMENT — ENCOUNTER SYMPTOMS
ABDOMINAL PAIN: 0
APNEA: 0
BACK PAIN: 0
COLOR CHANGE: 0
CHEST TIGHTNESS: 0
CHOKING: 0
ABDOMINAL DISTENTION: 0

## 2020-09-29 ENCOUNTER — TELEPHONE (OUTPATIENT)
Dept: FAMILY MEDICINE CLINIC | Age: 65
End: 2020-09-29

## 2020-09-29 ENCOUNTER — HOSPITAL ENCOUNTER (OUTPATIENT)
Dept: PHYSICAL THERAPY | Age: 65
Setting detail: THERAPIES SERIES
Discharge: HOME | End: 2020-09-29
Payer: MEDICARE

## 2020-09-29 RX ORDER — FLUCONAZOLE 150 MG/1
150 TABLET ORAL DAILY
Qty: 7 TABLET | Refills: 0 | Status: SHIPPED | OUTPATIENT
Start: 2020-09-29 | End: 2020-10-13 | Stop reason: SDUPTHER

## 2020-09-29 NOTE — TELEPHONE ENCOUNTER
Patient if having yeast infection symptoms and would like you to prescribe  Diflucan to help her please advise

## 2020-09-29 NOTE — PROGRESS NOTES
Physical Therapy  Daily Treatment Note  Date: 2020  Patient Name: King Chandana  MRN: [id number]     :   1955    Treatment Diagnosis: 2-part displaced fracture of neck of right humerus s/p fall    Subjective:   General  Chart Reviewed: Yes  Additional Pertinent Hx: Pt reports had a fall approx 6 weeks ago (Aug) pt fell and fractured her right shoulder/ Humeral neck fracture; No surgery performed and now here for PT  Family / Caregiver Present: Yes(pts exhusband)  Referring Practitioner: Dr. Leigh Cisneros PA-C  PT Visit Information  Onset Date: 20  Total # of Visits Approved: 12(1-2 weeks for 4-6 weeks=12)  Total # of Visits to Date: 1  Plan of Care/Certification Expiration Date: 10/16/20  No Show: 0  Canceled Appointment: 2  Subjective  Subjective: Pt. cancelled due to illness. Treatment Activities:                                                                          Assessment:   Conditions Requiring Skilled Therapeutic Intervention  Body structures, Functions, Activity limitations: Decreased functional mobility ; Decreased endurance;Decreased ADL status; Decreased ROM; Increased pain;Decreased strength  Treatment Diagnosis: 2-part displaced fracture of neck of right humerus s/p fall  Prognosis: Good  REQUIRES PT FOLLOW UP: Yes      G-Code:     OutComes Score                                                     Goals:  Short term goals  Time Frame for Short term goals: 1-2 weeks from date of eval on 20  Short term goal 1: Pt reports right shoulder pain as 3/10 or less with ADLS  Long term goals  Time Frame for Long term goals : 4-6 weeks from date of eval on 20  Long term goal 1: Pt reports right shoulder/R UE pain as 2/10 or less with overhead movements  Long term goal 2: Pt increase AROM of right shoulder as follows: R shoulder flex=140, abd=140 degrees and able to reach into right back pocket  Long term goal 3:  Increase R UE strength to 4+/5 or > so that pt can use her R UE to perform household chores  Long term goal 4: Pt reports that she is able to tolerate laying on her right arm without waking due to pain  Long term goal 5: Indep with HEP  Patient Goals   Patient goals :  To decrease her right arm pain and be able to use her arm again    Plan:      Plan  Times per week: 1-2 times per week for 4-6 weeks  Current Treatment Recommendations: Strengthening, Home Exercise Program, ROM, Manual Therapy - Soft Tissue Mobilization, Modalities, Pain Management  Plan Comment: KT tape, CP/HP        Therapy Time   Individual Concurrent Group Co-treatment   Time In           Time Out           Minutes  0                 Kirt Phan PTA  [address] and [address] Number: [id number]

## 2020-10-01 ENCOUNTER — APPOINTMENT (OUTPATIENT)
Dept: PHYSICAL THERAPY | Age: 65
End: 2020-10-01
Payer: MEDICARE

## 2020-10-02 ENCOUNTER — HOSPITAL ENCOUNTER (OUTPATIENT)
Dept: ORTHOPEDIC SURGERY | Age: 65
Discharge: HOME | End: 2020-10-04
Payer: MEDICARE

## 2020-10-02 ENCOUNTER — OFFICE VISIT (OUTPATIENT)
Dept: ORTHOPEDIC SURGERY | Age: 65
End: 2020-10-02
Payer: MEDICARE

## 2020-10-02 VITALS
TEMPERATURE: 96.9 F | HEART RATE: 89 BPM | BODY MASS INDEX: 23.32 KG/M2 | WEIGHT: 140 LBS | HEIGHT: 65 IN | OXYGEN SATURATION: 99 %

## 2020-10-02 PROCEDURE — 73030 X-RAY EXAM OF SHOULDER: CPT

## 2020-10-02 PROCEDURE — 73030 X-RAY EXAM OF SHOULDER: CPT | Performed by: PHYSICIAN ASSISTANT

## 2020-10-02 PROCEDURE — 99024 POSTOP FOLLOW-UP VISIT: CPT | Performed by: PHYSICIAN ASSISTANT

## 2020-10-02 RX ORDER — MELOXICAM 7.5 MG/1
TABLET ORAL
Status: ON HOLD | COMMUNITY
Start: 2020-09-22 | End: 2020-11-30 | Stop reason: HOSPADM

## 2020-10-02 RX ORDER — LIDOCAINE 4 G/G
1 PATCH TOPICAL DAILY
COMMUNITY

## 2020-10-02 RX ORDER — METHENAMINE, SODIUM PHOSPHATE, MONOBASIC, MONOHYDRATE, PHENYL SALICYLATE, METHYLENE BLUE, AND HYOSCYAMINE SULFATE 120; 40.8; 36; 10; .12 MG/1; MG/1; MG/1; MG/1; MG/1
CAPSULE ORAL
COMMUNITY
Start: 2020-09-27 | End: 2020-11-16 | Stop reason: SDUPTHER

## 2020-10-02 ASSESSMENT — ENCOUNTER SYMPTOMS
DIARRHEA: 0
SHORTNESS OF BREATH: 0
CONSTIPATION: 0
NAUSEA: 0
BACK PAIN: 0
VOMITING: 0
STRIDOR: 0
WHEEZING: 0
COLOR CHANGE: 0

## 2020-10-02 NOTE — PROGRESS NOTES
[facility]      Subjective:      Chief Complaint   Patient presents with    Follow-up     4 wk f/u right humerus fx; rates her pain today 2/10       HPI: Sloan Michelle is a 59 y.o. female who 3 months since suffering a right proximal humeral fracture. Minimal pain at this point, working with PT without any issues. Some decreased ROM but improved from previous. 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 Bryant Montanez Osteopenia     Sciatica     Venous insufficiency       Past Surgical History:   Procedure Laterality Date    COLONOSCOPY      CYSTOSCOPY N/A 2014    x2    FOREIGN BODY REMOVAL Right 04/21/15    PARTIAL, GROIN    HERNIA REPAIR      double hernia oct.  2 2013    HYSTERECTOMY  7/14/11    bso Paulo Gallo    KIDNEY STONE SURGERY      x2    OTHER SURGICAL HISTORY      laser surgery facundo dx    [place] COLON CA SCRN NOT HI RSK IND N/A 4/19/2017    COLONOSCOPY performed by Yuri Myers MD at [address] N/A 10/19/2016    ANAL PROCTO SIGMOIDOSCOPY RIGID / excision perineal nodule performed by Amber Sanders MD at [address] [place] [address] History     Socioeconomic History    Marital status:      Spouse name: Not on file    Number of children: Not on file    Years of education: Not on file    Highest education level: Not on file   Occupational History    Occupation: cook   Social Needs    Financial resource strain: Not on file    Food insecurity     Worry: Not on file     Inability: Not on file   [employer] needs     Medical: Not on file     Non-medical: Not on file   Tobacco Use    Smoking status: Current Every Day Smoker mouth 3 times daily 12 tablet 0    midodrine (PROAMATINE) 2.5 MG tablet Take 1 tablet by mouth 2 times daily (with meals) 90 tablet 3    naloxone 4 MG/0.1ML LIQD nasal spray 1 spray by Nasal route as needed for Opioid Reversal 1 each 5    albuterol sulfate  (90 Base) MCG/ACT inhaler Inhale 2 puffs into the lungs every 6 hours as needed for Wheezing 1 Inhaler 1    albuterol sulfate HFA (PROAIR HFA) 108 (90 Base) MCG/ACT inhaler Inhale 2 puffs into the lungs every 6 hours as needed for Wheezing or Shortness of Breath 1 Inhaler 3    calcium-vitamin D (OSCAL 500/200 D-3) 500-200 MG-UNIT per tablet Take 1 tablet by mouth 2 times daily.  therapeutic multivitamin-minerals (THERAGRAN-M) tablet Take 1 tablet by mouth daily.  Masks (FACE MASK EARLOOP-STYLE) MISC 1 Device by Does not apply route once for 1 dose 1 each 0     No current facility-administered medications on file prior to visit. Review of Systems   Constitutional: Negative for appetite change and fever. Respiratory: Negative for shortness of breath, wheezing and stridor. Cardiovascular: Negative for chest pain and palpitations. Gastrointestinal: Negative for constipation, diarrhea, nausea and vomiting. Musculoskeletal: Negative for arthralgias, back pain, joint swelling, myalgias and neck pain. Skin: Negative for color change and rash. Neurological: Negative for dizziness, speech difficulty, weakness and light-headedness.        Objective:   Ht 5' 5\" (1.651 m)   Wt 140 lb (63.5 kg)   BMI 23.30 kg/m²     General: WDWN, well appearing, in no distress   Skin: without breakdown, rash, normal in color    Ortho Exam  R shoulder : no pain to palpation, abduction at 80, flexion at 70, good external and intenal rotation with comparative strength b/l    Radiographs and Laboratory Studies:     Narrative    2 views oor right shoulder show 2 part displaced fracture with adequate    callus formation at surgical neck, improved from previous exam with no    further displacement of humeral shaft from head          Laboratory Studies:   Lab Results   Component Value Date    WBC 6.4 07/27/2020    HGB 8.8 (L) 07/27/2020    HCT 26.4 (L) 07/27/2020    MCV 92.4 07/27/2020     07/27/2020     No results found for: SEDRATE  No results found for: CRP    Assessment and Plan:      Diagnosis Orders   1. 2-part disp fx of surgical neck of right humerus, init  XR SHOULDER RIGHT (MIN 2 VIEWS)     2 months since injury, working with PT with some residual loss of motion of the shoulder. Decent strength. Continue working with PT, ROM, minimal resistance training and no lifting > 15 lbs. Healing noted on XR, no longer needs the sling - only with excessive activity. See her back 1 more time in 6 wks     The above plan was discussed in thorough detail with Dr. Isabell Cannon and the patient. No orders of the defined types were placed in this encounter. No orders of the defined types were placed in this encounter. No follow-ups on file.     Mir Lantigua PA-C  [facility] [address] [facility]  [phone]

## 2020-10-05 ENCOUNTER — HOSPITAL ENCOUNTER (OUTPATIENT)
Dept: PHYSICAL THERAPY | Age: 65
Setting detail: THERAPIES SERIES
Discharge: HOME | End: 2020-10-05
Payer: MEDICARE

## 2020-10-05 PROCEDURE — 97530 THERAPEUTIC ACTIVITIES: CPT

## 2020-10-05 PROCEDURE — 97140 MANUAL THERAPY 1/> REGIONS: CPT

## 2020-10-05 PROCEDURE — 97110 THERAPEUTIC EXERCISES: CPT

## 2020-10-05 ASSESSMENT — PAIN SCALES - GENERAL: PAINLEVEL_OUTOF10: 5

## 2020-10-05 ASSESSMENT — PAIN DESCRIPTION - ORIENTATION: ORIENTATION: RIGHT

## 2020-10-05 ASSESSMENT — PAIN DESCRIPTION - LOCATION: LOCATION: ARM

## 2020-10-05 ASSESSMENT — PAIN DESCRIPTION - PAIN TYPE: TYPE: ACUTE PAIN

## 2020-10-05 ASSESSMENT — PAIN DESCRIPTION - FREQUENCY: FREQUENCY: INTERMITTENT

## 2020-10-05 ASSESSMENT — PAIN DESCRIPTION - DESCRIPTORS: DESCRIPTORS: ACHING;TINGLING

## 2020-10-05 NOTE — PROGRESS NOTES
fall  Prognosis: Good  REQUIRES PT FOLLOW UP: Yes      G-Code:     OutComes Score                                                     Goals:  Short term goals  Time Frame for Short term goals: 1-2 weeks from date of eval on 9/17/20  Short term goal 1: Pt reports right shoulder pain as 3/10 or less with ADLS  Long term goals  Time Frame for Long term goals : 4-6 weeks from date of eval on 9/17/20  Long term goal 1: Pt reports right shoulder/R UE pain as 2/10 or less with overhead movements  Long term goal 2: Pt increase AROM of right shoulder as follows: R shoulder flex=140, abd=140 degrees and able to reach into right back pocket  Long term goal 3: Increase R UE strength to 4+/5 or > so that pt can use her R UE to perform household chores  Long term goal 4: Pt reports that she is able to tolerate laying on her right arm without waking due to pain  Long term goal 5: Indep with HEP  Patient Goals   Patient goals :  To decrease her right arm pain and be able to use her arm again    Plan:        Plan  Times per week: 1-2 times per week for 4-6 weeks  Current Treatment Recommendations: Strengthening, Home Exercise Program, ROM, Manual Therapy - Soft Tissue Mobilization, Modalities, Pain Management  Plan Comment: KT tape, CP/HP        Therapy Time   Individual Concurrent Group Co-treatment   Time In  400         Time Out  [facility]  License and [address] Number: [id number]

## 2020-10-06 ENCOUNTER — TELEPHONE (OUTPATIENT)
Dept: FAMILY MEDICINE CLINIC | Age: 65
End: 2020-10-06

## 2020-10-06 NOTE — TELEPHONE ENCOUNTER
Patient called stating that she finished the diflucan and is still having symptoms of burning, itching and urine odor. She states she is following up with obgyn tomorrow as well as the urologist and wanted to know if there is something else that can be prescribed for her in the meantime to [facility] [employer].

## 2020-10-06 NOTE — TELEPHONE ENCOUNTER
I could prescribe a topical medication to help with the vaginal itching. This is used to be used on the labia area and not inserted vaginally. Does she want to try this? What pharmacy is good?

## 2020-10-07 ENCOUNTER — OFFICE VISIT (OUTPATIENT)
Dept: OBGYN CLINIC | Age: 65
End: 2020-10-07
Payer: MEDICARE

## 2020-10-07 VITALS
HEART RATE: 88 BPM | HEIGHT: 65 IN | BODY MASS INDEX: 22.82 KG/M2 | DIASTOLIC BLOOD PRESSURE: 68 MMHG | SYSTOLIC BLOOD PRESSURE: 110 MMHG | WEIGHT: 137 LBS

## 2020-10-07 DIAGNOSIS — R30.0 BURNING WITH URINATION: ICD-10-CM

## 2020-10-07 LAB
REASON FOR REJECTION: NORMAL
REJECTED TEST: NORMAL

## 2020-10-07 PROCEDURE — 99213 OFFICE O/P EST LOW 20 MIN: CPT | Performed by: OBSTETRICS & GYNECOLOGY

## 2020-10-07 RX ORDER — CIPROFLOXACIN 500 MG/1
500 TABLET, FILM COATED ORAL 2 TIMES DAILY
Qty: 20 TABLET | Refills: 0 | Status: SHIPPED | OUTPATIENT
Start: 2020-10-07 | End: 2020-10-17

## 2020-10-07 NOTE — PROGRESS NOTES
Gurvinder Bean is a 59 y.o. female who presents here today for complaints of patient complaining of urinary urgency, frequency every 1-2 hours during the day and 3-4 times during the night. Patient complains that sometimes she has been the bathroom every 20 minutes. Urine analysis negative for infection. Patient has tried oxybutynin lately which very mildly improves her symptoms but with significant side effects patient\" passed out\" and broke her right shoulder medication stoped. 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 Pao Doran Osteopenia     Sciatica     Venous insufficiency      Past Surgical History:   Procedure Laterality Date    COLONOSCOPY      CYSTOSCOPY N/A 2014    x2    FOREIGN BODY REMOVAL Right 04/21/15    PARTIAL, GROIN    HERNIA REPAIR      double hernia oct.  2 2013    HYSTERECTOMY  11    bso Robert Webber    KIDNEY STONE SURGERY      x2    OTHER SURGICAL HISTORY      laser surgery facundo dx    [place] COLON CA SCRN NOT HI RSK IND N/A 2017    COLONOSCOPY performed by Lion Justice MD at [address] N/A 10/19/2016    ANAL PROCTO SIGMOIDOSCOPY RIGID / excision perineal nodule performed by Aida Poole MD at [address] [place] [address]       OB History        2    Para   2    Term                AB        Living   2       SAB        TAB        Ectopic        Molar        Multiple        Live Births Family History   Problem Relation Age of Onset    Heart Disease Mother     Other Mother         dementia    Arthritis Father      Social History     Socioeconomic History    Marital status:      Spouse name: Not on file    Number of children: Not on file    Years of education: Not on file    Highest education level: Not on file   Occupational History    Occupation: [id number]   Social Needs    Financial resource strain: Not on file    Food insecurity     Worry: Not on file     Inability: Not on file   [employer] needs     Medical: Not on file     Non-medical: Not on file   Tobacco Use    Smoking status: Current Every Day Smoker     Packs/day: 0.50     Years: 20.00     Pack years: 10.00     Types: Cigarettes    Smokeless tobacco: Never Used    Tobacco comment: relaxes me   Substance and Sexual Activity    Alcohol use: Yes     Alcohol/week: 5.0 standard drinks     Types: 5 Cans of beer per week     Comment: social    Drug use: No    Sexual activity: Yes     Partners: Male   Lifestyle    Physical activity     Days per week: Not on file     Minutes per session: Not on file    Stress: Not on file   Relationships    Social connections     Talks on phone: Not on file     Gets together: Not on file     Attends Congregational service: Not on file     Active member of club or organization: Not on file     Attends meetings of clubs or organizations: Not on file     Relationship status: Not on file    Intimate partner violence     Fear of current or ex partner: Not on file     Emotionally abused: Not on file     Physically abused: Not on file     Forced sexual activity: Not on file   Other Topics Concern    Not on file   Social History Narrative    ** Merged History Encounter **            Contraceptive method:  none    Patient's medications, allergies, past medical, surgical, social and family histories were reviewed and updated as appropriate.     Review of Systems  As per chief complaint   All other systems reviewed and are negative. Urgency, frequency, incontinence  Physical Exam:  Vitals:  /68 (Site: Left Upper Arm, Position: Sitting, Cuff Size: Medium Adult)   Pulse 88   Ht 5' 5\" (1.651 m)   Wt 137 lb (62.1 kg)   BMI 22.80 kg/m²   Lungs: CTAB   Heart : Regular S1/S2, no M/R/G  Abdomen: Soft , NT, ND , + BS   Pelvic exam : deferred    Assessment:      Diagnosis Orders   1. Burning with urination  Urinalysis    Culture, Urine   2. Urge urinary incontinence         Plan:     Mirabegron 50 mg 1 tablet p.o. daily for 2 weeks  Patient to return in 2 weeks  Briefly discussed other treatment options as sacral nerve neuromodulation versus Botox bladder injections    Orders Placed This Encounter   Procedures    Culture, Urine     Standing Status:   Future     Number of Occurrences:   1     Standing Expiration Date:   10/7/2021     Order Specific Question:   Specify (ex-cath, midstream, cysto, etc)? Answer:   midstream    Urinalysis     Standing Status:   Future     Number of Occurrences:   1     Standing Expiration Date:   10/7/2021     Orders Placed This Encounter   Medications    ciprofloxacin (CIPRO) 500 MG tablet     Sig: Take 1 tablet by mouth 2 times daily for 10 days     Dispense:  20 tablet     Refill:  0    mirabegron (MYRBETRIQ) 50 MG TB24     Sig: Lot: [id number] Exp: 4/2022     Dispense:  14 tablet     Refill:  0     Patient was seen with total face to face time of 25 minutes. More than 50% of this visit was counseling and education regarding The primary encounter diagnosis was Burning with urination. A diagnosis of Urge urinary incontinence was also pertinent to this visit. and Other (Bladder spasms and recurrent UTI.)   as well as  counseling on preventative health maintenance follow-up. Follow Up:  Return in about 2 weeks (around 10/21/2020) for medication assessment.         Robert Winkler MD

## 2020-10-08 ENCOUNTER — TELEPHONE (OUTPATIENT)
Dept: OBGYN CLINIC | Age: 65
End: 2020-10-08

## 2020-10-08 RX ORDER — CLOBETASOL PROPIONATE 0.5 MG/G
CREAM TOPICAL
Qty: 60 G | Refills: 1 | Status: SHIPPED | OUTPATIENT
Start: 2020-10-08

## 2020-10-08 NOTE — TELEPHONE ENCOUNTER
Noted. It looks like they're still able to run the urine culture, as it says it's in process. Will watch for culture and sensitivity results.

## 2020-10-08 NOTE — TELEPHONE ENCOUNTER
[facility] lab with rejected specimen- urinalysis, was not closed all the way, bag full of urine.

## 2020-10-09 LAB — URINE CULTURE, ROUTINE: NORMAL

## 2020-10-13 ENCOUNTER — TELEPHONE (OUTPATIENT)
Dept: OBGYN CLINIC | Age: 65
End: 2020-10-13

## 2020-10-13 ENCOUNTER — TELEPHONE (OUTPATIENT)
Dept: FAMILY MEDICINE CLINIC | Age: 65
End: 2020-10-13

## 2020-10-13 ENCOUNTER — HOSPITAL ENCOUNTER (OUTPATIENT)
Dept: PHYSICAL THERAPY | Age: 65
Setting detail: THERAPIES SERIES
Discharge: HOME | End: 2020-10-13
Payer: MEDICARE

## 2020-10-13 RX ORDER — FLUCONAZOLE 150 MG/1
150 TABLET ORAL DAILY
Qty: 7 TABLET | Refills: 0 | Status: SHIPPED | OUTPATIENT
Start: 2020-10-13 | End: 2020-10-20

## 2020-10-13 NOTE — TELEPHONE ENCOUNTER
Pt is having yeast problem, feels like   Bladder medication she has been prescibed for spasms, may be causing problem. She was additionally prescribed cipro by her ob/gyn the same provider. Pt is also complaining of burning sensation inside and after urination. She is specifically asking for yeast relief. Thank you.

## 2020-10-13 NOTE — TELEPHONE ENCOUNTER
I would recommend that she discuss the symptoms with her gynecologist if they do not improve. I have sent a prescription in for additional yeast infection medication to the local pharmacy.

## 2020-10-13 NOTE — PROGRESS NOTES
Physical Therapy  Cancelled Visit Note  Date: 10/13/2020  Patient Name: Rey Payan  MRN: [id number]     :   1955    Subjective:   General  Chart Reviewed: Yes  Referring Practitioner: Dr. Mendy Costa PATuckerC  PT Visit Information  Onset Date: 20  Total # of Visits Approved: 12(1-2 weeks for 4-6 weeks=12)  Total # of Visits to Date: 2  Plan of Care/Certification Expiration Date: 10/16/20  No Show: 0  Canceled Appointment: 3  Subjective  Subjective: Recheck appt cx due to illness           Treatment Activities:                                                                          Assessment:   Conditions Requiring Skilled Therapeutic Intervention  REQUIRES PT FOLLOW UP: Yes      G-Code:     OutComes Score                                                     Goals:       Plan:             Therapy Time   Individual Concurrent Group Co-treatment   Time In           Time Out           Minutes                   Jose Enrique Jerez, PT  [facility] [address] Number: [id number]

## 2020-10-13 NOTE — TELEPHONE ENCOUNTER
Patient states she has been on cipro for 6-7 days for an infection she has. Patient states that she is still having burning, and states the cipro is not working. She still has a couple days left of the cipro. She is also wondering if the myrbetriq may be causing the infection. Please advise.

## 2020-10-13 NOTE — TELEPHONE ENCOUNTER
Means is negative for infection . Let her stop the myrbetrique to see . Can also drop another specimen if she can . That is unusual if she has no infeciton.

## 2020-10-14 NOTE — TELEPHONE ENCOUNTER
Patient needs to come back and to discuss next step in her treatment which might not incorporate medications, there is different causes why the patient had a urinary tract infection. She can use the over-the-counter treatments but I do not think they will be effective patient could use them and follow-up with me if they do not work. I do recommend that she comes back so we can sit and discuss the next step in her treatment plan, this is my recommendation and the rest is up to her.

## 2020-10-21 NOTE — TELEPHONE ENCOUNTER
Patient called, she missed her appointment yesterday to follow up on medication. She states that she now has some pressure (feels like she is having a baby) after urinating, and burning and discomfort . She states she has not been able to do the urinalysis that was ordered, due to transportation issues. She will have it done tomorrow and she is rescheduled for 10/27/20.

## 2020-10-23 ENCOUNTER — TELEPHONE (OUTPATIENT)
Dept: FAMILY MEDICINE CLINIC | Age: 65
End: 2020-10-23

## 2020-10-23 NOTE — TELEPHONE ENCOUNTER
There is a monistat product over-the-counter that comes in a moist towlette form. This may offer her relief in symptoms as far as the irritation and difficulty even wearing underwear and because it is external, this should not worsen bladder spasms.

## 2020-10-23 NOTE — TELEPHONE ENCOUNTER
Patient called stating the cream that you had given her is not helping, that the irritation is so bad, she is not able to wear underwear as it increased pain and irritation and having trouble walking due to the pain. She states she does not see Ob/gyn until next Tuesday and wanted to know what you recommended. She states her Infectious disease physician recommended the Monistat but she does not want to use that at this time due to \"bladder spasms\".  Please advise

## 2020-10-26 ENCOUNTER — TELEPHONE (OUTPATIENT)
Dept: OBGYN CLINIC | Age: 65
End: 2020-10-26

## 2020-10-26 ENCOUNTER — HOSPITAL ENCOUNTER (OUTPATIENT)
Age: 65
Setting detail: SPECIMEN
Discharge: HOME | End: 2020-10-26
Payer: MEDICARE

## 2020-10-26 ENCOUNTER — TELEPHONE (OUTPATIENT)
Dept: INFECTIOUS DISEASES | Age: 65
End: 2020-10-26

## 2020-10-26 LAB
BACTERIA: NEGATIVE /HPF
BILIRUBIN URINE: NEGATIVE
BLOOD, URINE: NEGATIVE
CLARITY: CLEAR
COLOR: ABNORMAL
EPITHELIAL CELLS, UA: NORMAL /HPF (ref 0–5)
GLUCOSE URINE: NEGATIVE MG/DL
HYALINE CASTS: NORMAL /HPF (ref 0–5)
KETONES, URINE: NEGATIVE MG/DL
LEUKOCYTE ESTERASE, URINE: ABNORMAL
NITRITE, URINE: NEGATIVE
PH UA: 7 (ref 5–9)
PROTEIN UA: NEGATIVE MG/DL
RBC UA: NORMAL /HPF (ref 0–2)
SPECIFIC GRAVITY UA: 1.01 (ref 1–1.03)
URINE REFLEX TO CULTURE: ABNORMAL
UROBILINOGEN, URINE: 0.2 E.U./DL
WBC UA: NORMAL /HPF (ref 0–5)

## 2020-10-26 PROCEDURE — 81001 URINALYSIS AUTO W/SCOPE: CPT

## 2020-10-26 PROCEDURE — 87086 URINE CULTURE/COLONY COUNT: CPT

## 2020-10-26 RX ORDER — FLUCONAZOLE 150 MG/1
150 TABLET ORAL DAILY
Qty: 3 TABLET | Refills: 0 | Status: SHIPPED | OUTPATIENT
Start: 2020-10-26 | End: 2020-10-29

## 2020-10-26 NOTE — TELEPHONE ENCOUNTER
Patient states she has SnS of vaginal yeast infection. Wants to update ID Physician, requesting something for this. States she has burning and discharge and bad odor. Patient has tolerated taking Diflucan in the past.  No fever or chills but feeling tired. Patient confirms V/v for tomorrow 10/27/20.   Okay to Call daughter Ernie Dempsey, send V/v link to [phone]

## 2020-10-27 ENCOUNTER — VIRTUAL VISIT (OUTPATIENT)
Dept: INFECTIOUS DISEASES | Age: 65
End: 2020-10-27
Payer: MEDICARE

## 2020-10-27 PROCEDURE — 99213 OFFICE O/P EST LOW 20 MIN: CPT | Performed by: INTERNAL MEDICINE

## 2020-10-27 NOTE — RESULT ENCOUNTER NOTE
Please notify Rubén Nevarez that urine test results do not show any significant evidence of infection but urine color is noted to be green. Is she taking OTC bladder medication? When is her next follow up with urology?

## 2020-10-27 NOTE — PROGRESS NOTES
bactrim)  9/2019: negative  8/2019: Pseudomonas ( I Aztreonam )     Patient has chronic issues with urinary incontinence with pressure and constipation. She has been to uro-gyn and did not have any sign of prolapse. Has been prescribed estrogen cream.     Follows with  Dr Isidro Galo urology  She was to start a cranberry supplement and continue her D-mannose. Discussed warm water rinses after bowel movements and even after urination. States that she takes metamucil for constipation    Complains of burning and vaginal itching. Has appointment with gyn today. Has been on 3 day course of fluconazole for one day but still having symptoms. She wants to skip her gyn appointment bc does not feel good - encouraged her to go. Reviewed labs with patient and her daughter. 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 Javier Gaffney Osteopenia     Sciatica     Venous insufficiency        Past Surgical History:   Procedure Laterality Date    COLONOSCOPY      CYSTOSCOPY N/A 2014    x2    FOREIGN BODY REMOVAL Right 04/21/15    PARTIAL, GROIN    HERNIA REPAIR      double hernia oct.  2 2013    HYSTERECTOMY  7/14/11    bso Ministerio Degree    KIDNEY STONE SURGERY      x2    OTHER SURGICAL HISTORY      laser surgery facundo dx    CT COLON CA SCRN NOT HI RSK IND N/A 4/19/2017    COLONOSCOPY performed by Bart Bryant MD at [address] N/A 10/19/2016    ANAL PROCTO SIGMOIDOSCOPY RIGID / excision perineal nodule performed by Ha Zarate MD at [address] History     Socioeconomic History    Marital status:      Spouse name: Not on file    Number of children: Not on file    Years of education: Not on TABLET BY MOUTH TWICE DAILY      Meth-Hyo-M Bl-Na Phos-Ph Sal (URIBEL) 118 MG CAPS TAKE 1 CAPSULE BY MOUTH 4 TIMES DAILY AS NEEDED FOR BLADDER PAIN URGENCY      lidocaine 4 % external patch Place 1 patch onto the skin daily      midodrine (PROAMATINE) 2.5 MG tablet Take 1 tablet by mouth 2 times daily (with meals) 90 tablet 3    naloxone 4 MG/0.1ML LIQD nasal spray 1 spray by Nasal route as needed for Opioid Reversal 1 each 5    albuterol sulfate  (90 Base) MCG/ACT inhaler Inhale 2 puffs into the lungs every 6 hours as needed for Wheezing 1 Inhaler 1    Masks (FACE MASK EARLOOP-STYLE) MISC 1 Device by Does not apply route once for 1 dose 1 each 0    albuterol sulfate HFA (PROAIR HFA) 108 (90 Base) MCG/ACT inhaler Inhale 2 puffs into the lungs every 6 hours as needed for Wheezing or Shortness of Breath 1 Inhaler 3    calcium-vitamin D (OSCAL 500/200 D-3) 500-200 MG-UNIT per tablet Take 1 tablet by mouth 2 times daily.  therapeutic multivitamin-minerals (THERAGRAN-M) tablet Take 1 tablet by mouth daily. No current facility-administered medications on file prior to visit. Since we cannot conduct an in-person exam, the following were addressed with the patient. I have asked the patient to assist in their exam:  Patient denies any general new issues. Patient does not observe any new skin rashes or ulcers. Patient does not perceive any new visual deficits  Patient does not feel like they are \"clammy\" or sweating  Patient denies any dry mouth or sore mouth and is able to flex and extend her neck with ease. Patient can inhale and exhale without any difficulty and feels like their chest is expanding symmetrically. They do not feel short of breath or any distress when asked to move around. No pain with expansion of the chest  Patient is able to feel their own pulse and agrees it is regular. Patient states their abdomen is not protruberant beyond their normal size.  States that there is no pain when touching the area beneath the sternum when directed, or the left or the right side of the abdomen. They feel no pain when asked to press on the suprapubic area or the right or left flank. Patient denies any pain when asked to squeeze both upper legs and lower legs anteriorly or posteriorly. They do not see any new swelling of their joints. No observed neurological changes or slurred speech when speaking to the patient      +nausea and vaginal burning and odor. Labs: I have reviewed all lab results by electronic record, including most recent CBC, metabolic panel, and pertinent abnormalities were addressed from an infectious disease perspective. WBC trends are being monitored. Lab Results   Component Value Date     07/27/2020    K 3.4 07/27/2020     07/27/2020    CO2 25 07/27/2020    BUN 13 07/27/2020    CREATININE 0.55 07/27/2020    GLUCOSE 138 07/27/2020    CALCIUM 8.2 07/27/2020      Lab Results   Component Value Date    WBC 6.4 07/27/2020    HGB 8.8 (L) 07/27/2020    HCT 26.4 (L) 07/27/2020    MCV 92.4 07/27/2020     07/27/2020       Radiology:   I have reviewed imaging results per electronic record and most pertinent abnormalities are being addressed from an infectious disease standpoint. Assessment:  Vaginal yeast infection/vaginitis  Recurrent UTIs - UA negative now. Multiple allergies to meds. dysuria  Perineal and perianal lesions with path + Bowen's Disease. Plan:  No more abx. Follow up with urology  She is to go to the gyn today for eval.   Continue her 3 day course of fluconazole.    Total time spent on this chart >25 min    Apple Swain D.O.

## 2020-10-27 NOTE — TELEPHONE ENCOUNTER
Patient called and stated she was told by her infectious disease doctor to make an appointment for a vaginal culture. Patient states she is having vaginal itching and odor with no relief. She would like an appointment as soon as possible. Please advise.

## 2020-10-28 LAB — URINE CULTURE, ROUTINE: NORMAL

## 2020-10-30 ENCOUNTER — TELEPHONE (OUTPATIENT)
Dept: FAMILY MEDICINE CLINIC | Age: 65
End: 2020-10-30

## 2020-10-30 NOTE — TELEPHONE ENCOUNTER
Patients daughter called in about an [place] [facility] that gave her a yeast infection medicatiometronidazole 500mgn  but results came back and she does not have a yeast infection she is not happy that she is being prescribed medications with out a pelvic exam     She would like to speak to Gerardo pizarro or her MA about this.  Issue [id number] Danay Meraz Daughter is not on her hippa

## 2020-10-30 NOTE — TELEPHONE ENCOUNTER
I spoke to Mariza Nieto and Oscar daughter to tell her that we need calos to put her in the hippa form, so we can discuss her concerns regarding her mother. Adry Mckenna informed me that her concerns are that Baldo Mendoza has been harassing all the doctor's she sees to give her either an antibiotic or a pain medication without being evaluated. Adry Mckenna also said that the state has picked up on her, so kathy didn't want leno jones to get in any trouble. mj

## 2020-10-31 NOTE — TELEPHONE ENCOUNTER
Patient called today and stated that she was told by Choco Calero to come in to the walk in clinic to get a pap or a pelvic exam. I was unable to locate anywhere in her chart where it said to do that. She wants to come in on Monday to get it done. I advised her that I would wait until she heard back from us on what the next step would be for her. She understood. Abe Sawyer

## 2020-11-02 NOTE — TELEPHONE ENCOUNTER
Please discuss with daughter what type of visit is being requested and help her schedule visit with Nikhil Mcgee if this visit is indicated.

## 2020-11-03 ENCOUNTER — HOSPITAL ENCOUNTER (OUTPATIENT)
Dept: PHYSICAL THERAPY | Age: 65
Setting detail: THERAPIES SERIES
Discharge: HOME | End: 2020-11-03
Payer: MEDICARE

## 2020-11-03 PROCEDURE — 97530 THERAPEUTIC ACTIVITIES: CPT

## 2020-11-03 PROCEDURE — 97140 MANUAL THERAPY 1/> REGIONS: CPT

## 2020-11-03 PROCEDURE — 97110 THERAPEUTIC EXERCISES: CPT

## 2020-11-03 NOTE — PROGRESS NOTES
Physical Therapy  Monthly Recheck  Date: 11/3/2020  Patient Name: Royal Wellington  MRN: [id number]     :   1955    Subjective:   General  Chart Reviewed: Yes  Additional Pertinent Hx: Pt reports had a fall approx 6 weeks ago (Aug) pt fell and fractured her right shoulder/ Humeral neck fracture; No surgery performed and now here for PT  Family / Caregiver Present: Yes(pts exhusband)  Referring Practitioner: Dr. Babak Diego PA-C  PT Visit Information  Onset Date: 20  Total # of Visits Approved: (7-11)  Total # of Visits to Date: 3  Plan of Care/Certification Expiration Date: 20  No Show: 0  Canceled Appointment: 3  Subjective  Subjective: Pt presents to PT not feeling well however agreeable to participate in therapy recheck. Pt having urinary sxs- and currently on antibiotic. Pt reports less shoulder pain since initiating therapy, increased use of R shoulder and noticing increased mobility of R shoulder. Ave pain 5/10 R shoulder during ADLs.    Pain Screening  Patient Currently in Pain: No(no pain at rest mild pain with movement R shoulder)  Vital Signs  Patient Currently in Pain: No(no pain at rest mild pain with movement R shoulder)  Patient Observation  Observations: kyphotic posture in standing and seated position       Treatment Activities:       AROM RUE (degrees)  R Shoulder Flexion 0-180: 0-91 deg, (80 deg standing), 110  deg supine (118 deg P/AAROM)(with scap hiking)  R Shoulder Extension 0-45: AROM 50 degrees in sitanding supine 85 deg, 103 deg P/AAROM  R Shoulder ABduction 0-180: 0-65 deg standing   R Shoulder Int Rotation  0-70: Functional IR thumb to L3-4  R Shoulder Ext Rotation 0-90: 60 deg at 45 deg abd supine, 70 deg AAROM ER  Strength RUE  R Shoulder Flexion: 3-/5  R Shoulder ABduction: 3-/5  R Shoulder Internal Rotation: 4-/5;4/5  R Shoulder External Rotation: 3+/5;4-/5  R Elbow Flexion: 4/5  R Elbow Extension: 4/5  R Forearm Pron: 4/5  R Forearm Sup: 4/5  R Wrist Flexion: 4/5  R Wrist Extension: 4/5  Strength LUE  Comment: Grossly 4/5 L UE    Exercises  Exercise 4: scap retraction x 5 hold 3 sec tactile and VC for form (only 5 reps due to complaints of dizziness thus pt sat down)  Exercise 5: cap D x 10 tactile and VC (seated due to dizziness in standing)  Exercise 7: supine  BUE wand flexion x 10 hold 3 sec mod VC for scap setting   Exercise 8: supine RUE wand ABD x 10 hold 3 sec max VC also AAROM for improved technique and form   Exercise 9: supine wand ER x 10 hold 3 sec at 45 deg abd x 10 hold 3 sec tactile cues and VC for correct form and technique  Exercise 11: seated rows YTB x 10 H 3(mod vcs and tactile cues for correct form)  Exercise 12: seated bilat ER YTB x 10 H3(mod vcs and tactile cues for correct form)        Manual therapy  PROM: PROM/ gentle stretching R shd flexion, abduction and ER all hard end feels          Assessment:   Conditions Requiring Skilled Therapeutic Intervention  Body structures, Functions, Activity limitations: Decreased functional mobility ; Decreased endurance;Decreased ADL status; Decreased ROM; Increased pain;Decreased strength  Assessment: Slow progress. R shoulder ROM and strength remain very limited however both ROM and strength have improved since initial evaluation; R scapular substitution persists during attempted active flex and abd R shoulder. Pain level has improved however pt cont to required mod to max vcs and tactile cues for correct form of exs. Limited tolerance to standing exs due to complaints of \"dizziness\" in standing. Recommend cont PT 1-2x per wk x 3-4 weeks to further improve ROM, strength and overall functional mobility. Treatment Diagnosis: 2-part displaced fracture of neck of right humerus s/p fall  Prognosis: Good  Exam: Reassessment completed. Reviewed HEP and importance of compliance with HEP and increased use of R shoulder as tolerated. Reviewed joint protection and use of CP and heat PRN for pain and stiffness. Decision Making: Medium Complexity  REQUIRES PT FOLLOW UP: Yes               Goals:  Short term goals  Time Frame for Short term goals: 1-2 weeks from date of eval on 9/17/20  Short term goal 1: Pt reports right shoulder pain as 3/10 or less with ADLS(GOAL not met- 4/96 pain R shoulder with movement)  Long term goals  Time Frame for Long term goals : 4-6 weeks from date of eval on 9/17/20  Long term goal 1: Pt reports right shoulder/R UE pain as 2/10 or less with overhead movements(GOAL not met- pain 5/10)  Long term goal 2: Pt increase AROM of right shoulder as follows: R shoulder flex=140, abd=140 degrees and able to reach into right back pocket(GOAL not met)  Long term goal 3: Increase R UE strength to 4+/5 or > so that pt can use her R UE to perform household chores(GOAL not met)  Long term goal 4: Pt reports that she is able to tolerate laying on her right arm without waking due to pain(GOAL partially met)  Long term goal 5: Indep with HEP(GOAL partially met)  Patient Goals   Patient goals :  To decrease her right arm pain and be able to use her arm again    Plan:     Recommend cont per POC 1-2x per wk x 3-4 weeks  Frequency and duration of tx  Days: (1-2)  Weeks: (3-4)     Therapy Time   Individual Concurrent Group Co-treatment   Time In  1450         Time Out  1550         Minutes  60 [facility], [address] [facility], [place]

## 2020-11-05 ENCOUNTER — HOSPITAL ENCOUNTER (OUTPATIENT)
Dept: PHYSICAL THERAPY | Age: 65
Setting detail: THERAPIES SERIES
Discharge: HOME | End: 2020-11-05
Payer: MEDICARE

## 2020-11-05 NOTE — TELEPHONE ENCOUNTER
Patient called stating that infectious disease wants her to have a pap exam.  She was given the next available appointment with Melita. She decided to keep her upcoming appointment with the gynocologist which was sooner than the appointment with Melita.   Luis Alfredo Moy

## 2020-11-05 NOTE — PROGRESS NOTES
Physical Therapy  Daily Treatment Note  Date: 2020  Patient Name: Laureano Claudio  MRN: [id number]     :   1955    Subjective:   General  Chart Reviewed: Yes  Additional Pertinent Hx: Pt reports had a fall approx 6 weeks ago (Aug) pt fell and fractured her right shoulder/ Humeral neck fracture; No surgery performed and now here for PT  Family / Caregiver Present: Yes(pts exhusband)  Referring Practitioner: Dr. Queen Carolina PA-C  PT Visit Information  Onset Date: 20  Total # of Visits Approved: (7-11)  Total # of Visits to Date: 3  Plan of Care/Certification Expiration Date: 20  No Show: 0  Canceled Appointment: 4  Subjective  Subjective: Pt. cancelled appointment. Treatment Activities:                                                                          Assessment:   Conditions Requiring Skilled Therapeutic Intervention  Body structures, Functions, Activity limitations: Decreased functional mobility ; Decreased endurance;Decreased ADL status; Decreased ROM; Increased pain;Decreased strength  Treatment Diagnosis: 2-part displaced fracture of neck of right humerus s/p fall  Prognosis: Good  REQUIRES PT FOLLOW UP: Yes      G-Code:     OutComes Score                                                     Goals:  Short term goals  Time Frame for Short term goals: 1-2 weeks from date of eval on 20  Short term goal 1: Pt reports right shoulder pain as 3/10 or less with ADLS(GOAL not met-  pain R shoulder with movement)  Long term goals  Time Frame for Long term goals : 4-6 weeks from date of eval on 20  Long term goal 1: Pt reports right shoulder/R UE pain as 2/10 or less with overhead movements(GOAL not met- pain 5/10)  Long term goal 2: Pt increase AROM of right shoulder as follows: R shoulder flex=140, abd=140 degrees and able to reach into right back pocket(GOAL not met)  Long term goal 3:  Increase R UE strength to 4+/5 or > so that pt can use her R UE to perform household chores(GOAL not met)  Long term goal 4: Pt reports that she is able to tolerate laying on her right arm without waking due to pain(GOAL partially met)  Long term goal 5: Indep with HEP(GOAL partially met)  Patient Goals   Patient goals :  To decrease her right arm pain and be able to use her arm again    Plan:       Frequency and duration of tx  Days: (1-2)  Weeks: (3-4)     Therapy Time   Individual Concurrent Group Co-treatment   Time In           Time Out           Minutes  0 cx                 Conrado Bloch, PTA  License and [address] Number: [id number]

## 2020-11-10 ENCOUNTER — TELEPHONE (OUTPATIENT)
Dept: ORTHOPEDIC SURGERY | Age: 65
End: 2020-11-10

## 2020-11-10 ENCOUNTER — HOSPITAL ENCOUNTER (OUTPATIENT)
Dept: PHYSICAL THERAPY | Age: 65
Setting detail: THERAPIES SERIES
Discharge: HOME | End: 2020-11-10
Payer: MEDICARE

## 2020-11-10 NOTE — PROGRESS NOTES
Physical Therapy  Discharge Treatment Note  Date: 11/10/2020  Patient Name: Jackie Platt  MRN: [id number]     :   1955    Subjective:      PT Visit Information  Onset Date: 20  Total # of Visits Approved: (7-11)  Total # of Visits to Date: 3  Plan of Care/Certification Expiration Date: 20  No Show: 0  Canceled Appointment: 5  Subjective  Subjective: Pt called to cx appt thsi date due to illness. General Comment  Comments: Educated pt that we will need to discharge PT at this time due to attendance policy nd difficulty attending, 3 visits and 5 cancels. There is a 87% attendance policy. Pt discharged from PT. Unable to readdress LTG progress with decreased attendance and limited participation. Pt ot get new referral if needed when able to consistently attend PT . Assessment:     Pt with limited attendance. PT needs to be discharged at this time as unable to advance with limited visits. Plan:     Discharge PT per therapy attendance policy. Pt understands discharge.         Therapy Time   Individual Concurrent Group Co-treatment   Time In           Time Out           Minutes  0 cx            Paulo Noel, [id number]

## 2020-11-11 NOTE — TELEPHONE ENCOUNTER
[facility] called and wanted to inform our office of the policy they have for rehab. Patient's are required to show 50% of their scheduled appointment. Sherita Hanley would like us to be aware that patient had only attended 3 appointments and canceled 6. They had discharged patient for non compliance. They are willing to do another evaluation on the patient, but patient needs to be aware of being compliant in order to get the full affect of her there therapy.

## 2020-11-12 ENCOUNTER — HOSPITAL ENCOUNTER (EMERGENCY)
Age: 65
Discharge: HOME | End: 2020-11-12
Attending: EMERGENCY MEDICINE
Payer: MEDICARE

## 2020-11-12 ENCOUNTER — TELEPHONE (OUTPATIENT)
Dept: FAMILY MEDICINE CLINIC | Age: 65
End: 2020-11-12

## 2020-11-12 VITALS
SYSTOLIC BLOOD PRESSURE: 135 MMHG | HEIGHT: 65 IN | RESPIRATION RATE: 16 BRPM | HEART RATE: 100 BPM | WEIGHT: 137 LBS | OXYGEN SATURATION: 96 % | DIASTOLIC BLOOD PRESSURE: 72 MMHG | BODY MASS INDEX: 22.82 KG/M2 | TEMPERATURE: 98.4 F

## 2020-11-12 LAB
BACTERIA: ABNORMAL /HPF
BILIRUBIN URINE: ABNORMAL
BLOOD, URINE: ABNORMAL
CLARITY: ABNORMAL
COLOR: ABNORMAL
EPITHELIAL CELLS, UA: ABNORMAL /HPF
GLUCOSE URINE: 100 MG/DL
KETONES, URINE: 40 MG/DL
LEUKOCYTE ESTERASE, URINE: ABNORMAL
NITRITE, URINE: POSITIVE
PH UA: 6 (ref 5–9)
PROTEIN UA: 30 MG/DL
RBC UA: ABNORMAL /HPF (ref 0–2)
SPECIFIC GRAVITY UA: 1.02 (ref 1–1.03)
URINE REFLEX TO CULTURE: YES
UROBILINOGEN, URINE: 1 E.U./DL
WBC UA: ABNORMAL /HPF (ref 0–5)

## 2020-11-12 PROCEDURE — 87086 URINE CULTURE/COLONY COUNT: CPT

## 2020-11-12 PROCEDURE — 87077 CULTURE AEROBIC IDENTIFY: CPT

## 2020-11-12 PROCEDURE — 87186 SC STD MICRODIL/AGAR DIL: CPT

## 2020-11-12 PROCEDURE — 6370000000 HC RX 637 (ALT 250 FOR IP): Performed by: EMERGENCY MEDICINE

## 2020-11-12 PROCEDURE — 81001 URINALYSIS AUTO W/SCOPE: CPT

## 2020-11-12 PROCEDURE — 99285 EMERGENCY DEPT VISIT HI MDM: CPT

## 2020-11-12 RX ORDER — PREGABALIN 75 MG/1
75 CAPSULE ORAL 2 TIMES DAILY
COMMUNITY
End: 2020-11-16

## 2020-11-12 RX ORDER — ONDANSETRON 4 MG/1
4 TABLET, ORALLY DISINTEGRATING ORAL ONCE
Status: COMPLETED | OUTPATIENT
Start: 2020-11-12 | End: 2020-11-12

## 2020-11-12 RX ORDER — ONDANSETRON 4 MG/1
4 TABLET, FILM COATED ORAL EVERY 8 HOURS PRN
Qty: 20 TABLET | Refills: 0 | Status: SHIPPED | OUTPATIENT
Start: 2020-11-12 | End: 2020-11-16 | Stop reason: SDUPTHER

## 2020-11-12 RX ORDER — SULFAMETHOXAZOLE AND TRIMETHOPRIM 800; 160 MG/1; MG/1
1 TABLET ORAL ONCE
Status: COMPLETED | OUTPATIENT
Start: 2020-11-12 | End: 2020-11-12

## 2020-11-12 RX ORDER — SULFAMETHOXAZOLE AND TRIMETHOPRIM 800; 160 MG/1; MG/1
1 TABLET ORAL 2 TIMES DAILY
Qty: 20 TABLET | Refills: 0 | Status: SHIPPED | OUTPATIENT
Start: 2020-11-12 | End: 2020-11-15

## 2020-11-12 RX ADMIN — SULFAMETHOXAZOLE AND TRIMETHOPRIM 1 TABLET: 800; 160 TABLET ORAL at 18:07

## 2020-11-12 RX ADMIN — ONDANSETRON 4 MG: 4 TABLET, ORALLY DISINTEGRATING ORAL at 18:07

## 2020-11-12 ASSESSMENT — PAIN DESCRIPTION - PROGRESSION: CLINICAL_PROGRESSION: NOT CHANGED

## 2020-11-12 ASSESSMENT — PAIN SCALES - GENERAL
PAINLEVEL_OUTOF10: 8
PAINLEVEL_OUTOF10: 8

## 2020-11-12 ASSESSMENT — PAIN DESCRIPTION - ONSET: ONSET: SUDDEN

## 2020-11-12 ASSESSMENT — PAIN DESCRIPTION - PAIN TYPE
TYPE: ACUTE PAIN
TYPE: CHRONIC PAIN

## 2020-11-12 ASSESSMENT — PAIN DESCRIPTION - FREQUENCY
FREQUENCY: INTERMITTENT
FREQUENCY: CONTINUOUS

## 2020-11-12 ASSESSMENT — PAIN DESCRIPTION - DESCRIPTORS
DESCRIPTORS: ACHING
DESCRIPTORS: THROBBING

## 2020-11-12 ASSESSMENT — PAIN DESCRIPTION - ORIENTATION
ORIENTATION: LOWER
ORIENTATION: LOWER

## 2020-11-12 ASSESSMENT — PAIN DESCRIPTION - DIRECTION: RADIATING_TOWARDS: RADIATES DOWN RIGHT LEG

## 2020-11-12 ASSESSMENT — PAIN DESCRIPTION - LOCATION
LOCATION: BACK
LOCATION: BACK

## 2020-11-12 NOTE — ED NOTES
Explained discharge instructions and two prescriptions to patient. Went over discharge diagnosis and pertinent educational material with patient. Patient stated understanding of discharge diagnosis, instructions, and prescriptions. Patient denies any questions at this time, all concerns addressed. No signs or symptoms of pain or distress noted at this time. Patient discharged to home with \"ex \". A/0 x3, ambulatory, resps even and unlabored air. Follow up instructions and reasons to return to ER reviewed.       Radha Garcia RN  11/12/20 [id number]

## 2020-11-12 NOTE — ED PROVIDER NOTES
HPI:  11/12/20, Time: 5:28 PM JULIO Espinoza is a 59 y.o. female presenting to the ED for dysuria, beginning 1 week ago. The complaint has been intermittent, mild in severity, and worsened by nothing. No vaginal bleeding she did have vaginal discharge but is using a cream from her primary medical doctor for bacterial vaginosis she has chronic back pain she states that that is unchanged she is currently in pain management. She has frequency urgency and dysuria no hematuria no fever no chills    ROS:   Pertinent positives and negatives are stated within HPI, all other systems reviewed and are negative.  --------------------------------------------- PAST HISTORY ---------------------------------------------  Past Medical History:  has a past medical history of Bowen's disease, Chronic back pain, Chronic kidney disease, COPD (chronic obstructive pulmonary disease) ([facility] [address] Irritable bowel syndrome with constipation, Lumbosacral spondylosis without myelopathy- Dr. Marvin Luo, Osteopenia, Sciatica, and Venous insufficiency. Past Surgical History:  has a past surgical history that includes Hysterectomy (7/14/11); other surgical history; hernia repair; Kidney stone surgery; Foreign Body Removal (Right, 04/21/15); Colonoscopy; Cystocopy (N/A, 2014); skin biopsy; Tonsillectomy; Sigmoidoscopy (N/A, 10/19/2016); and pr colon ca scrn not hi rsk ind (N/A, 4/19/2017). Social History:  reports that she has been smoking cigarettes. She has a 10.00 pack-year smoking history. She has never used smokeless tobacco. She reports current alcohol use of about 5.0 standard drinks of alcohol per week. She reports that she does not use drugs. Family History: family history includes Arthritis in her father; Heart Disease in her mother; Other in her mother. The patients home medications have been reviewed. Allergies: Macrobid [nitrofurantoin monohydrate macrocrystals];  Nitrofurantoin; and Pcn [penicillins]    ---------------------------------------------------PHYSICAL EXAM--------------------------------------     Constitutional/General: Alert and oriented x3, well appearing, non toxic in NAD  Head: Normocephalic and atraumatic  Eyes: PERRL, EOMI  Mouth: Oropharynx clear, handling secretions, no trismus  Neck: Supple, full ROM, non tender to palpation in the midline, no stridor, no crepitus, no meningeal signs  Pulmonary: Lungs clear to auscultation bilaterally, no wheezes, rales, or rhonchi. Not in respiratory distress  Cardiovascular:  Regular rate. Regular rhythm. No murmurs, gallops, or rubs. 2+ distal pulses  Chest: no chest wall tenderness  Exam reveals no midline tenderness of the thoracic spine there is tenderness of the lumbar paraspinal muscles and over L3 and L4 no foot drop no saddle paresthesias  Abdomen: Soft. Non tender. Non distended. +BS. No rebound, guarding, or rigidity. No pulsatile masses appreciated. Musculoskeletal: Moves all extremities x 4. Warm and well perfused, no clubbing, cyanosis, or edema. Capillary refill <3 seconds  Skin: warm and dry. No rashes. Neurologic: GCS 15, CN 2-12 grossly intact, no focal deficits, symmetric strength 5/5 in the upper and lower extremities bilaterally  Psych: Normal Affect    -------------------------------------------------- RESULTS -------------------------------------------------  I have personally reviewed all laboratory and imaging results for this patient. Results are listed below.      LABS:  Results for orders placed or performed during the hospital encounter of 11/12/20   Urine Reflex to Culture    Specimen: Urine, clean catch   Result Value Ref Range    Color, UA Brown Straw/Yellow    Clarity, UA SLCLOUDY Clear    Glucose, Ur 100 (A) Negative mg/dL    Bilirubin Urine Moderate Negative    Ketones, Urine 40 (A) Negative mg/dL    Specific Gravity, UA 1.020 1.005 - 1.030    Blood, Urine Trace-intact Negative    pH, UA 6.0 5.0 - 9.0 Protein, UA 30 (A) Negative mg/dL    Urobilinogen, Urine 1.0 <2.0 E.U./dL    Nitrite, Urine Positive Negative    Leukocyte Esterase, Urine Small Negative    Urine Reflex to Culture Yes    Microscopic Urinalysis   Result Value Ref Range    WBC, UA 10-20 (A) 0 - 5 /HPF    RBC, UA 0-2 0 - 2 /HPF    Epithelial Cells, UA 5-10 /HPF    Bacteria, UA RARE (A) Negative /HPF       RADIOLOGY:  Interpreted by Radiologist.  No orders to display         ------------------------- NURSING NOTES AND VITALS REVIEWED ---------------------------   The nursing notes within the ED encounter and vital signs as below have been reviewed by myself. BP (!) 183/84   Pulse 104   Temp 98.4 °F (36.9 °C) (Oral)   Resp 16   Ht 5' 5\" (1.651 m)   Wt 137 lb (62.1 kg)   LMP  (LMP Unknown)   SpO2 97%   BMI 22.80 kg/m²   Oxygen Saturation Interpretation: Normal    The patients available past medical records and past encounters were reviewed. ------------------------------ ED COURSE/MEDICAL DECISION MAKING----------------------  Medications   ondansetron (ZOFRAN-ODT) disintegrating tablet 4 mg (has no administration in time range)   sulfamethoxazole-trimethoprim (BACTRIM DS;SEPTRA DS) 800-160 MG per tablet 1 tablet (has no administration in time range)             Medical Decision Making:    I ordered a urinalysis. I did discuss with the patient that I could not change any of her pain medications as she is in pain management I did offer to treat her pain while she was in the ER and she declined to declined a pelvic exam she was placed on Omnicef          This patient's ED course included: a personal history and physicial eaxmination    This patient has remained hemodynamically stable during their ED course. Counseling: The emergency provider has spoken with the patient and discussed todays results, in addition to providing specific details for the plan of care and counseling regarding the diagnosis and prognosis.   Questions are answered at this time and they are agreeable with the plan.       --------------------------------- IMPRESSION AND DISPOSITION ---------------------------------    IMPRESSION  1. Dysuria        DISPOSITION  Disposition: Discharge to home  Patient condition is good        NOTE: This report was transcribed using voice recognition software.  Every effort was made to ensure accuracy; however, inadvertent computerized transcription errors may be present          Rey Peoples MD  11/12/20 7897

## 2020-11-12 NOTE — ED TRIAGE NOTES
Patient presents to ED with c/o chronic lower back pain and vaginal discomfort.  Back pain is chronic and she is followed by pain management and recently had injections

## 2020-11-12 NOTE — TELEPHONE ENCOUNTER
Order placed for lab test urine culture. Please let her know the lab orders.
Patient is aware. mj
patient is asking for a urine test.    Patient states she is experiencing heavy burning. She also advises she recently had a pelvic exam with Dr. Michele Peacock who discontinued her estrogen. Please advise.     Thank Sherren Bonito.
none

## 2020-11-13 ENCOUNTER — TELEPHONE (OUTPATIENT)
Dept: FAMILY MEDICINE CLINIC | Age: 65
End: 2020-11-13

## 2020-11-13 NOTE — TELEPHONE ENCOUNTER
Please have the patient schedule an appointment with me at my next available. I would encourage her to change positions slowly. She does have a history of orthostatic low blood pressures. Does she know how her blood pressure has been running lately?

## 2020-11-13 NOTE — TELEPHONE ENCOUNTER
Atrophic vaginitis was diagnosed on the 6th by Dr Oleg Bradford office. She was prescribed a vaginal cream to help with a yeast issue that was discontinued. Because of burning.

## 2020-11-13 NOTE — TELEPHONE ENCOUNTER
Please let her know that the urine culture is not back yet. However she had evidence of glucose in her urine and ketones. Has she made any significant changes to her diet like a low-carb diet? I would recommend getting blood work done to check sugar levels. She did have evidence of nitrates in the urine which is typically indicative of a UTI so I would recommend that she go ahead and start her antibiotic. She will be contacted when urine culture results come back if need for antibiotic change is required.

## 2020-11-13 NOTE — TELEPHONE ENCOUNTER
Patient was in ED most of the night. Patient called to inform:  A urine was taken and Bactrim was given. Patient also states she has been constipated for quite some time. States she is dizzy when she gets up.       Thank Veronica Hawkins.

## 2020-11-13 NOTE — TELEPHONE ENCOUNTER
Pt called back and is wondering if she needs to take the bactrim that was prescribed to her yesterday. Pt states that they are concerned with the smell of her urine. She is aware that PCP still needs to look over results. Please advise.

## 2020-11-13 NOTE — TELEPHONE ENCOUNTER
It is difficult to say what is causing her throbbing pain symptoms. If pain is severe, I recommend ER evaluation this weekend.

## 2020-11-13 NOTE — TELEPHONE ENCOUNTER
Patient says she has difficulty procuring a ride. Would a virtual appt. be acceptable? She states that her Blood pressure has been good.     Sharon Son

## 2020-11-14 ENCOUNTER — HOSPITAL ENCOUNTER (EMERGENCY)
Age: 65
Discharge: HOME | End: 2020-11-14
Attending: EMERGENCY MEDICINE
Payer: MEDICARE

## 2020-11-14 ENCOUNTER — APPOINTMENT (OUTPATIENT)
Dept: CT IMAGING | Age: 65
End: 2020-11-14
Payer: MEDICARE

## 2020-11-14 VITALS
RESPIRATION RATE: 18 BRPM | HEART RATE: 88 BPM | SYSTOLIC BLOOD PRESSURE: 130 MMHG | DIASTOLIC BLOOD PRESSURE: 67 MMHG | WEIGHT: 137 LBS | HEIGHT: 65 IN | TEMPERATURE: 98.3 F | BODY MASS INDEX: 22.82 KG/M2 | OXYGEN SATURATION: 96 %

## 2020-11-14 LAB
BACTERIA: ABNORMAL /HPF
BILIRUBIN URINE: ABNORMAL
BLOOD, URINE: ABNORMAL
CLARITY: ABNORMAL
COLOR: ABNORMAL
EPITHELIAL CELLS, UA: ABNORMAL /HPF
FINE CASTS, UA: ABNORMAL /LPF (ref 0–5)
GLUCOSE URINE: NEGATIVE MG/DL
KETONES, URINE: ABNORMAL MG/DL
LEUKOCYTE ESTERASE, URINE: NEGATIVE
MUCUS: PRESENT /LPF
NITRITE, URINE: NEGATIVE
PH UA: 6.5 (ref 5–9)
PROTEIN UA: 30 MG/DL
SPECIFIC GRAVITY UA: 1.02 (ref 1–1.03)
URINE REFLEX TO CULTURE: ABNORMAL
UROBILINOGEN, URINE: 0.2 E.U./DL
WBC UA: ABNORMAL /HPF (ref 0–5)

## 2020-11-14 PROCEDURE — 6370000000 HC RX 637 (ALT 250 FOR IP): Performed by: EMERGENCY MEDICINE

## 2020-11-14 PROCEDURE — 74176 CT ABD & PELVIS W/O CONTRAST: CPT

## 2020-11-14 PROCEDURE — 81001 URINALYSIS AUTO W/SCOPE: CPT

## 2020-11-14 PROCEDURE — 99284 EMERGENCY DEPT VISIT MOD MDM: CPT

## 2020-11-14 RX ORDER — ONDANSETRON 4 MG/1
4 TABLET, ORALLY DISINTEGRATING ORAL EVERY 8 HOURS PRN
Qty: 10 TABLET | Refills: 0 | Status: ON HOLD | OUTPATIENT
Start: 2020-11-14 | End: 2020-11-30 | Stop reason: HOSPADM

## 2020-11-14 RX ORDER — ONDANSETRON 4 MG/1
4 TABLET, ORALLY DISINTEGRATING ORAL ONCE
Status: COMPLETED | OUTPATIENT
Start: 2020-11-14 | End: 2020-11-14

## 2020-11-14 RX ORDER — MAGNESIUM CITRATE
296 SOLUTION, ORAL ORAL ONCE
Qty: 1 BOTTLE | Refills: 0 | Status: SHIPPED | OUTPATIENT
Start: 2020-11-14 | End: 2020-11-14

## 2020-11-14 RX ORDER — TRAMADOL HYDROCHLORIDE 50 MG/1
50 TABLET ORAL EVERY 8 HOURS PRN
Qty: 15 TABLET | Refills: 0 | Status: SHIPPED | OUTPATIENT
Start: 2020-11-14 | End: 2020-11-19

## 2020-11-14 RX ORDER — TRAMADOL HYDROCHLORIDE 50 MG/1
50 TABLET ORAL ONCE
Status: COMPLETED | OUTPATIENT
Start: 2020-11-14 | End: 2020-11-14

## 2020-11-14 RX ADMIN — ONDANSETRON 4 MG: 4 TABLET, ORALLY DISINTEGRATING ORAL at 12:32

## 2020-11-14 RX ADMIN — TRAMADOL HYDROCHLORIDE 50 MG: 50 TABLET, FILM COATED ORAL at 12:32

## 2020-11-14 ASSESSMENT — ENCOUNTER SYMPTOMS
EYE REDNESS: 0
EYE PAIN: 0
STRIDOR: 0
WHEEZING: 0
CHOKING: 0
VOMITING: 0
FACIAL SWELLING: 0
CHEST TIGHTNESS: 0
EYE DISCHARGE: 0
SORE THROAT: 0
BLOOD IN STOOL: 0
DIARRHEA: 0
ABDOMINAL PAIN: 1
VOICE CHANGE: 0
CONSTIPATION: 1
SINUS PRESSURE: 0
BACK PAIN: 0
COUGH: 0
NAUSEA: 1
TROUBLE SWALLOWING: 0
SHORTNESS OF BREATH: 0

## 2020-11-14 ASSESSMENT — PAIN DESCRIPTION - DESCRIPTORS: DESCRIPTORS: ACHING;SORE

## 2020-11-14 ASSESSMENT — PAIN DESCRIPTION - PAIN TYPE: TYPE: ACUTE PAIN

## 2020-11-14 ASSESSMENT — PAIN SCALES - GENERAL: PAINLEVEL_OUTOF10: 6

## 2020-11-14 ASSESSMENT — PAIN DESCRIPTION - ORIENTATION: ORIENTATION: LOWER;MID

## 2020-11-14 ASSESSMENT — PAIN - FUNCTIONAL ASSESSMENT: PAIN_FUNCTIONAL_ASSESSMENT: ACTIVITIES ARE NOT PREVENTED

## 2020-11-14 ASSESSMENT — PAIN DESCRIPTION - ONSET: ONSET: ON-GOING

## 2020-11-14 ASSESSMENT — PAIN DESCRIPTION - PROGRESSION: CLINICAL_PROGRESSION: NOT CHANGED

## 2020-11-14 ASSESSMENT — PAIN DESCRIPTION - FREQUENCY: FREQUENCY: CONTINUOUS

## 2020-11-14 ASSESSMENT — PAIN DESCRIPTION - LOCATION: LOCATION: ABDOMEN

## 2020-11-14 NOTE — ED PROVIDER NOTES
[address] ED  eMERGENCY dEPARTMENT eNCOUnter      Pt Name: Adeel Morales  MRN: [id number]  [place] 1955  Date of evaluation: 11/14/2020  Provider: Deedee Christian MD    CHIEF COMPLAINT       Chief Complaint   Patient presents with    Abdominal Pain     lower abd pain for 1 week, patient recently was seen at [facility] for same         HISTORY OF PRESENT ILLNESS   (Location/Symptom, Timing/Onset,Context/Setting, Quality, Duration, Modifying Factors, Severity)  Note limiting factors. Adeel Morales is a 59 y.o. female who presents to the emergency department patient called ambulance because of constipation patient has been seeing pain management doctors for a long time because of chronic back pain as per patient she was given Lyrica but her daughter flushed it out of the toilet patient also complaining of chronic nausea and has no bowel movement for few days no documented fever taking antibiotics Bactrim started 3 days ago for UTI no fever no chills    HPI    NursingNotes were reviewed. REVIEW OF SYSTEMS    (2-9 systems for level 4, 10 or more for level 5)     Review of Systems   Constitutional: Positive for activity change and appetite change. Negative for fever. HENT: Negative for congestion, drooling, facial swelling, mouth sores, nosebleeds, sinus pressure, sore throat, trouble swallowing and voice change. Eyes: Negative for pain, discharge, redness and visual disturbance. Respiratory: Negative for cough, choking, chest tightness, shortness of breath, wheezing and stridor. Cardiovascular: Negative for chest pain, palpitations and leg swelling. Gastrointestinal: Positive for abdominal pain, constipation and nausea. Negative for blood in stool, diarrhea and vomiting. Endocrine: Negative for cold intolerance, polyphagia and polyuria. Genitourinary: Positive for difficulty urinating and flank pain. Negative for dysuria, frequency, genital sores and urgency.    Musculoskeletal: Negative for 0.50     Years: 20.00     Pack years: 10.00     Types: Cigarettes    Smokeless tobacco: Never Used    Tobacco comment: relaxes me   Substance and Sexual Activity    Alcohol use: Yes     Alcohol/week: 5.0 standard drinks     Types: 5 Cans of beer per week     Comment: social    Drug use: No    Sexual activity: Not Currently     Partners: Male   Lifestyle    Physical activity     Days per week: None     Minutes per session: None    Stress: None   Relationships    Social connections     Talks on phone: None     Gets together: None     Attends Buddhism service: None     Active member of club or organization: None     Attends meetings of clubs or organizations: None     Relationship status: None    Intimate partner violence     Fear of current or ex partner: None     Emotionally abused: None     Physically abused: None     Forced sexual activity: None   Other Topics Concern    None   Social History Narrative    ** Merged History Encounter **            SCREENINGS      @FLOW([id number])@      PHYSICAL EXAM    (up to 7 for level 4, 8 or more for level 5)     ED Triage Vitals [11/14/20 1211]   BP Temp Temp Source Pulse Resp SpO2 Height Weight   (!) 142/74 98.3 °F (36.8 °C) Oral 95 20 98 % 5' 5\" (1.651 m) 137 lb (62.1 kg)       Physical Exam  Vitals signs and nursing note reviewed. Constitutional:       General: She is not in acute distress. Appearance: She is well-developed and normal weight. She is not ill-appearing or toxic-appearing. Comments: Active alert cooperative patient slightly anxious at this time no evidence of dehydration mucous is moist   HENT:      Head: Normocephalic and atraumatic. Mouth/Throat:      Pharynx: No pharyngeal swelling or oropharyngeal exudate. Eyes:      Pupils: Pupils are equal, round, and reactive to light. Neck:      Musculoskeletal: Neck supple. Cardiovascular:      Rate and Rhythm: Normal rate and regular rhythm. Heart sounds: Normal heart sounds.  No murmur. No gallop. Pulmonary:      Effort: No respiratory distress. Breath sounds: Normal breath sounds. No wheezing. Abdominal:      General: Abdomen is flat. Bowel sounds are normal. There is abdominal bruit. There is no distension. Palpations: Abdomen is soft. There is no shifting dullness, fluid wave, hepatomegaly, splenomegaly, mass or pulsatile mass. Tenderness: There is abdominal tenderness. There is right CVA tenderness. There is no rebound. Hernia: There is no hernia in the umbilical area, ventral area, left inguinal area, right femoral area or left femoral area. Comments: Attention come to the abdomen no distention no guarding no rebound tenderness patient has no McBurney's point and no Hendrix sign minimal right flank tenderness deep palpation elicited   Musculoskeletal: Normal range of motion. General: No tenderness. Skin:     General: Skin is warm. Coloration: Skin is not cyanotic, jaundiced, mottled or pale. Findings: No erythema or rash. Neurological:      Mental Status: She is alert and oriented to person, place, and time. Cranial Nerves: No cranial nerve deficit. Motor: No abnormal muscle tone. Psychiatric:         Behavior: Behavior normal.         Thought Content: Thought content normal.         DIAGNOSTIC RESULTS     EKG: All EKG's are interpreted by the Emergency Department Physician who either signs or Co-signsthis chart in the absence of a cardiologist.    Venus Colmenares:   Shira Everts such as CT, Ultrasound and MRI are read by the radiologist. Plain radiographic images are visualized and preliminarily interpreted by the emergency physician with the below findings:      Interpretation per the Radiologist below, if available at the time ofthis note:    CT ABDOMEN PELVIS WO CONTRAST Additional Contrast? None   Final Result      No significant interval change from 11/26/2019.       Small calculus within dilated upper pole calyx on the right, similar to prior. No hydronephrosis.      ==========                     ED BEDSIDE ULTRASOUND:   Performed by ED Physician - none    LABS:  Labs Reviewed   URINE RT REFLEX TO CULTURE - Abnormal; Notable for the following components:       Result Value    Protein, UA 30 (*)     All other components within normal limits   MICROSCOPIC URINALYSIS - Abnormal; Notable for the following components:    Bacteria, UA RARE (*)     All other components within normal limits       All other labs were within normal range or not returned as of this dictation. EMERGENCY DEPARTMENT COURSE and DIFFERENTIAL DIAGNOSIS/MDM:   Vitals:    Vitals:    11/14/20 1211 11/14/20 1315   BP: (!) 142/74 130/67   Pulse: 95 88   Resp: 20 18   Temp: 98.3 °F (36.8 °C)    TempSrc: Oral    SpO2: 98% 96%   Weight: 137 lb (62.1 kg)    Height: 5' 5\" (1.651 m)            MDM    CRITICAL CARE TIME   Total Critical Care time was  minutes, excluding separately reportableprocedures. There was a high probability of clinicallysignificant/life threatening deterioration in the patient's condition which required my urgent intervention. CONSULTS:  None    PROCEDURES:  Unless otherwise noted below, none     Procedures    FINAL IMPRESSION      1. Nausea    2. Flank pain    3. Constipation, unspecified constipation type          DISPOSITION/PLAN   DISPOSITION Decision To Discharge 11/14/2020 01:13:07 PM      PATIENT REFERRED TO:  LINSEY Foster - [address], [address] [facility]  [phone]    In 2 days        DISCHARGE MEDICATIONS:  New Prescriptions    MAGNESIUM CITRATE (CITROMA) SOLN    Take 296 mLs by mouth once for 1 dose    ONDANSETRON (ZOFRAN ODT) 4 MG DISINTEGRATING TABLET    Take 1 tablet by mouth every 8 hours as needed for Nausea    TRAMADOL (ULTRAM) 50 MG TABLET    Take 1 tablet by mouth every 8 hours as needed for Pain for up to 5 days.           (Please note that portions of this note were completed with a voice recognition program.  Efforts were made to edit the dictations but occasionally words are mis-transcribed.)    Trey George MD (electronically signed)  Attending Emergency Physician       Trey George MD  11/14/20 [phone]

## 2020-11-14 NOTE — ED NOTES
Placed call to [facility], emergency contact for ride home. [facility] is out of town.       Major Paris RN  11/14/20 [id number]

## 2020-11-14 NOTE — ED NOTES
Called patients daughter to inform the sister in law can not take patient home.       Radha Medrano, RN  11/14/20 [id number]

## 2020-11-14 NOTE — ED NOTES
Placed call to patients daughter for ride home. Patients daughter states she does not have a ride for patient.       Major Paris RN  11/14/20 7012

## 2020-11-14 NOTE — ED NOTES
Spoke with [facility] Supervisor,  aggie Calhoun exhausted all ride option     Nikhil Mathews [facility]  11/14/20 [phone]

## 2020-11-14 NOTE — ED NOTES
Patient originally called the 911 for constipation. The patient states upon arrival to the ED her vagina hurts because she has vaginitis and she has belly pain lower abd. Pat ient bowel sounds active x 4 quads. Patient straight cath for urine bluish green hue due to OTC medications for UTI. Patient adriana area is not swollen, no redness no drainage noted when this RN straight cath for urine specimen.      Bhargav Noel RN  11/14/20 [id number]

## 2020-11-15 ENCOUNTER — TELEPHONE (OUTPATIENT)
Dept: FAMILY MEDICINE CLINIC | Age: 65
End: 2020-11-15

## 2020-11-15 RX ORDER — CIPROFLOXACIN 250 MG/1
250 TABLET, FILM COATED ORAL 2 TIMES DAILY
Qty: 2 TABLET | Refills: 0 | Status: SHIPPED | OUTPATIENT
Start: 2020-11-15 | End: 2020-11-16 | Stop reason: ALTCHOICE

## 2020-11-15 NOTE — TELEPHONE ENCOUNTER
ON call page: Patient states that she has a history of UTI and has been seen in the ER twice and placed on Bactrim but is not improving. She is in the care of Urology and infectious disease. She would like to change her antibiotic. Upon review, both species are sensitive to cipro. I advise that she be evaluated, but patient declined. I stated that I would send in a cipro as she states that has been more effective in the past. I advised her to call her urologist for further recommendations and follow up with her pcp tomorrow to determine next steps and decide whether or not to continue antibiotics. I advised her of the risk of multiple antibiotic regimens and patient was agreeable. I also advised that she take probiotics which she agreed to .

## 2020-11-16 ENCOUNTER — VIRTUAL VISIT (OUTPATIENT)
Dept: FAMILY MEDICINE CLINIC | Age: 65
End: 2020-11-16
Payer: MEDICARE

## 2020-11-16 PROCEDURE — 99442 PR PHYS/QHP TELEPHONE EVALUATION 11-20 MIN: CPT | Performed by: NURSE PRACTITIONER

## 2020-11-16 RX ORDER — PSYLLIUM HUSK 0.4 G
0.52 CAPSULE ORAL DAILY
Qty: 30 CAPSULE | Refills: 2 | Status: SHIPPED | OUTPATIENT
Start: 2020-11-16 | End: 2020-12-03 | Stop reason: SDUPTHER

## 2020-11-16 RX ORDER — CIPROFLOXACIN 250 MG/1
250 TABLET, FILM COATED ORAL 2 TIMES DAILY
Qty: 14 TABLET | Refills: 0 | Status: SHIPPED | OUTPATIENT
Start: 2020-11-16 | End: 2020-11-23

## 2020-11-16 RX ORDER — METHENAMINE, SODIUM PHOSPHATE, MONOBASIC, MONOHYDRATE, PHENYL SALICYLATE, METHYLENE BLUE, AND HYOSCYAMINE SULFATE 120; 40.8; 36; 10; .12 MG/1; MG/1; MG/1; MG/1; MG/1
CAPSULE ORAL
Qty: 120 CAPSULE | Refills: 1 | Status: SHIPPED | OUTPATIENT
Start: 2020-11-16

## 2020-11-16 RX ORDER — GREEN TEA/HOODIA GORDONII 315-12.5MG
1 CAPSULE ORAL 2 TIMES DAILY
Qty: 60 TABLET | Refills: 0 | Status: SHIPPED | OUTPATIENT
Start: 2020-11-16 | End: 2020-12-16

## 2020-11-16 RX ORDER — LIDOCAINE 36 MG/1
PATCH TOPICAL
COMMUNITY
Start: 2020-10-01

## 2020-11-16 RX ORDER — ESTRADIOL 0.1 MG/G
CREAM VAGINAL
COMMUNITY
Start: 2020-04-29

## 2020-11-16 ASSESSMENT — PATIENT HEALTH QUESTIONNAIRE - PHQ9
SUM OF ALL RESPONSES TO PHQ QUESTIONS 1-9: 2
1. LITTLE INTEREST OR PLEASURE IN DOING THINGS: 1
2. FEELING DOWN, DEPRESSED OR HOPELESS: 1
SUM OF ALL RESPONSES TO PHQ QUESTIONS 1-9: 2
SUM OF ALL RESPONSES TO PHQ QUESTIONS 1-9: 2
SUM OF ALL RESPONSES TO PHQ9 QUESTIONS 1 & 2: 2

## 2020-11-16 NOTE — PROGRESS NOTES
allergic reaction. She denies any difficulty breathing or swallowing. No lightheadedness or heart palpitations. Constipation: drinking prune juice ducolax and miralax. Drinking plenty of water. She states that she has been constipated for quite some time now. Has tried multiple over-the-counter medications including stool softener and a laxative with no relief in symptoms. States that she has also tried Linzess in the past as well as another branded medication that she cannot recall the name of and this was not helpful either. She has been having nausea lately. She is not sure if this could be related to recent allergic reaction to Bactrim because symptoms are worse than they have been in the past.  She states that she has seen Dr. Maryann Garcia and has had a colonoscopy before. She has not followed with a gastroenterologist in quite some time however. Upon further questioning, she feels that she gets enough fiber in her diet and has been taking fiber bars but still no relief in symptoms. It has been 3 days since her last bowel movement. Reports hard small stools otherwise. Prior to Visit Medications    Medication Sig Taking?  Authorizing Provider   estradiol (ESTRACE) 0.1 MG/GM vaginal cream Place vaginally Yes Historical Provider, MD SUNGLIDO 1.8 % PTCH APPLY UP TO 2 PATCHES OVER PAINFUL AREAS OF SPINE FOR 12 HOURS THEN REMOVE FOR 12 HOURS Yes Historical Provider, MD   Meth-Hyo-M Bl-Na Phos-Ph Sal (URIBEL) 118 MG CAPS TAKE 1 CAPSULE BY MOUTH 4 TIMES DAILY AS NEEDED FOR BLADDER PAIN URGENCY Yes LINSEY Conn CNP   ciprofloxacin (CIPRO) 250 MG tablet Take 1 tablet by mouth 2 times daily for 7 days Yes LINSEY Gardiner - CNP   Probiotic Acidophilus (FLORANEX) TABS Take 1 tablet by mouth 2 times daily Yes LINSEY Gardiner CNP   psyllium (METAMUCIL) 0.52 g capsule Take 1 capsule by mouth daily Yes LINSEY Gardiner CNP   traMADol (ULTRAM) 50 MG tablet Take 1 tablet by mouth \"[x]\" Indicates a positive item  \"[]\" Indicates a negative item  -- DELETE ALL ITEMS NOT EXAMINED]  Telephone visit. Due to this being a TeleHealth encounter, evaluation of the following organ systems is limited: Vitals/Constitutional/EENT/Resp/CV/GI//MS/Neuro/Skin/Heme-Lymph-Imm. ASSESSMENT/PLAN:   Diagnosis Orders   1. Urinary tract infection with hematuria, site unspecified  Meth-Hyo-M Bl-Na Phos-Ph Sal (URIBEL) 118 MG CAPS    ciprofloxacin (CIPRO) 250 MG tablet    psyllium (METAMUCIL) 0.52 g capsule   2. Dizziness     3. Elevated glucose  Hemoglobin A1C    psyllium (METAMUCIL) 0.52 g capsule   4. Bladder spasm  Meth-Hyo-M Bl-Na Phos-Ph Sal (URIBEL) 118 MG CAPS   5. Other constipation  XR ABDOMEN (KUB) (SINGLE AP VIEW)    Margie Ervin MD, Gastroenterology, [place]    [facility]) TABS   6. Glycosuria  psyllium (METAMUCIL) 0.52 g capsule   7. Urine ketones  psyllium (METAMUCIL) 0.52 g capsule         Return in about 2 months (around 1/16/2021) for telephone visit ok- follow up UTI. We discussed in detail need to try to get to the source of her symptoms. I do not recommend taking any further controlled substance at this time. She has an appointment scheduled with her urologist coming up and I also recommend that she establish care with gastroenterology for chronic constipation. Can try Metamucil for now. Recommend getting additional lab to include hemoglobin A1c. Urinalysis recently completed was noted to have glucose and ketones present. She is recently had an allergic reaction to Bactrim and therefore Cipro will be ordered based on most recent urine culture results. I also recommend getting a KUB of the abdomen today to help rule out obstruction.     Please note this report has been partially produced using speech recognition software and may cause contain errors related to that system including grammar, punctuation and spelling as well as words and phrases that may seem inappropriate. If there are questions or concerns please feel free to contact me to clarify. An  electronic signature was used to authenticate this note. --Syed Khan, APRN - CNP on 11/17/2020 at 7:11 AM        Pursuant to the emergency declaration under the [address] [place] [address], [facility] waiver authority and the [employer] and [employer] Act, this Virtual  Visit was conducted, with patient's consent, to reduce the patient's risk of exposure to COVID-19 and provide continuity of care for an established patient. Services were provided through a video synchronous discussion virtually to substitute for in-person clinic visit.

## 2020-11-16 NOTE — TELEPHONE ENCOUNTER
Noted. Will plan to discuss this with the patient during telephone visit today. ER visit note reviewed.

## 2020-11-17 ENCOUNTER — TELEPHONE (OUTPATIENT)
Dept: FAMILY MEDICINE CLINIC | Age: 65
End: 2020-11-17

## 2020-11-19 ENCOUNTER — HOSPITAL ENCOUNTER (EMERGENCY)
Age: 65
Discharge: HOME | End: 2020-11-19
Attending: EMERGENCY MEDICINE
Payer: MEDICARE

## 2020-11-19 ENCOUNTER — TELEPHONE (OUTPATIENT)
Dept: FAMILY MEDICINE CLINIC | Age: 65
End: 2020-11-19

## 2020-11-19 ENCOUNTER — APPOINTMENT (OUTPATIENT)
Dept: GENERAL RADIOLOGY | Age: 65
End: 2020-11-19
Payer: MEDICARE

## 2020-11-19 VITALS
SYSTOLIC BLOOD PRESSURE: 154 MMHG | DIASTOLIC BLOOD PRESSURE: 89 MMHG | HEART RATE: 93 BPM | RESPIRATION RATE: 17 BRPM | OXYGEN SATURATION: 97 % | TEMPERATURE: 98.4 F

## 2020-11-19 LAB
ALBUMIN SERPL-MCNC: 4.6 G/DL (ref 3.5–4.6)
ALP BLD-CCNC: 88 U/L (ref 40–130)
ALT SERPL-CCNC: 17 U/L (ref 0–33)
ANION GAP SERPL CALCULATED.3IONS-SCNC: 10 MEQ/L (ref 9–15)
AST SERPL-CCNC: 18 U/L (ref 0–35)
BACTERIA: ABNORMAL /HPF
BASOPHILS ABSOLUTE: 0.1 K/UL (ref 0–0.2)
BASOPHILS RELATIVE PERCENT: 1.2 %
BILIRUB SERPL-MCNC: 0.3 MG/DL (ref 0.2–0.7)
BILIRUBIN URINE: NEGATIVE
BLOOD, URINE: ABNORMAL
BUN BLDV-MCNC: 17 MG/DL (ref 8–23)
CALCIUM SERPL-MCNC: 10.1 MG/DL (ref 8.5–9.9)
CHLORIDE BLD-SCNC: 103 MEQ/L (ref 95–107)
CLARITY: CLEAR
CO2: 26 MEQ/L (ref 20–31)
COLOR: YELLOW
CREAT SERPL-MCNC: 0.56 MG/DL (ref 0.5–0.9)
EKG ATRIAL RATE: 87 BPM
EKG P AXIS: 50 DEGREES
EKG P-R INTERVAL: 134 MS
EKG Q-T INTERVAL: 332 MS
EKG QRS DURATION: 66 MS
EKG QTC CALCULATION (BAZETT): 399 MS
EKG R AXIS: -48 DEGREES
EKG T AXIS: -34 DEGREES
EKG VENTRICULAR RATE: 87 BPM
EOSINOPHILS ABSOLUTE: 0.1 K/UL (ref 0–0.7)
EOSINOPHILS RELATIVE PERCENT: 0.8 %
EPITHELIAL CELLS, UA: ABNORMAL /HPF
GFR AFRICAN AMERICAN: >60
GFR NON-AFRICAN AMERICAN: >60
GLOBULIN: 2.4 G/DL (ref 2.3–3.5)
GLUCOSE BLD-MCNC: 105 MG/DL (ref 70–99)
GLUCOSE URINE: NEGATIVE MG/DL
HCT VFR BLD CALC: 40 % (ref 37–47)
HEMOGLOBIN: 13.3 G/DL (ref 12–16)
KETONES, URINE: NEGATIVE MG/DL
LEUKOCYTE ESTERASE, URINE: ABNORMAL
LYMPHOCYTES ABSOLUTE: 1.6 K/UL (ref 1–4.8)
LYMPHOCYTES RELATIVE PERCENT: 25.5 %
MCH RBC QN AUTO: 29.4 PG (ref 27–31.3)
MCHC RBC AUTO-ENTMCNC: 33.2 % (ref 33–37)
MCV RBC AUTO: 88.4 FL (ref 82–100)
MONOCYTES ABSOLUTE: 0.5 K/UL (ref 0.2–0.8)
MONOCYTES RELATIVE PERCENT: 7.7 %
NEUTROPHILS ABSOLUTE: 4.2 K/UL (ref 1.4–6.5)
NEUTROPHILS RELATIVE PERCENT: 64.8 %
NITRITE, URINE: NEGATIVE
PDW BLD-RTO: 14.6 % (ref 11.5–14.5)
PH UA: 7.5 (ref 5–9)
PLATELET # BLD: 302 K/UL (ref 130–400)
POTASSIUM SERPL-SCNC: 4.3 MEQ/L (ref 3.4–4.9)
PROTEIN UA: NEGATIVE MG/DL
RBC # BLD: 4.52 M/UL (ref 4.2–5.4)
RBC UA: ABNORMAL /HPF (ref 0–2)
SODIUM BLD-SCNC: 139 MEQ/L (ref 135–144)
SPECIFIC GRAVITY UA: 1.02 (ref 1–1.03)
TOTAL PROTEIN: 7 G/DL (ref 6.3–8)
URINE REFLEX TO CULTURE: ABNORMAL
UROBILINOGEN, URINE: 0.2 E.U./DL
WBC # BLD: 6.5 K/UL (ref 4.8–10.8)
WBC UA: ABNORMAL /HPF (ref 0–5)

## 2020-11-19 PROCEDURE — 72100 X-RAY EXAM L-S SPINE 2/3 VWS: CPT

## 2020-11-19 PROCEDURE — 81001 URINALYSIS AUTO W/SCOPE: CPT

## 2020-11-19 PROCEDURE — 96374 THER/PROPH/DIAG INJ IV PUSH: CPT

## 2020-11-19 PROCEDURE — 74018 RADEX ABDOMEN 1 VIEW: CPT

## 2020-11-19 PROCEDURE — 93005 ELECTROCARDIOGRAM TRACING: CPT

## 2020-11-19 PROCEDURE — 80053 COMPREHEN METABOLIC PANEL: CPT

## 2020-11-19 PROCEDURE — 93010 ELECTROCARDIOGRAM REPORT: CPT | Performed by: INTERNAL MEDICINE

## 2020-11-19 PROCEDURE — 73502 X-RAY EXAM HIP UNI 2-3 VIEWS: CPT

## 2020-11-19 PROCEDURE — 96375 TX/PRO/DX INJ NEW DRUG ADDON: CPT

## 2020-11-19 PROCEDURE — 85025 COMPLETE CBC W/AUTO DIFF WBC: CPT

## 2020-11-19 PROCEDURE — 99283 EMERGENCY DEPT VISIT LOW MDM: CPT

## 2020-11-19 PROCEDURE — 6360000002 HC RX W HCPCS: Performed by: EMERGENCY MEDICINE

## 2020-11-19 PROCEDURE — 36415 COLL VENOUS BLD VENIPUNCTURE: CPT

## 2020-11-19 RX ORDER — PREGABALIN 75 MG/1
75 CAPSULE ORAL 2 TIMES DAILY
Qty: 60 CAPSULE | Status: CANCELLED | OUTPATIENT
Start: 2020-11-19

## 2020-11-19 RX ORDER — KETOROLAC TROMETHAMINE 30 MG/ML
30 INJECTION, SOLUTION INTRAMUSCULAR; INTRAVENOUS ONCE
Status: COMPLETED | OUTPATIENT
Start: 2020-11-19 | End: 2020-11-19

## 2020-11-19 RX ORDER — PREGABALIN 50 MG/1
50 CAPSULE ORAL 3 TIMES DAILY
Qty: 9 CAPSULE | Refills: 0 | Status: SHIPPED | OUTPATIENT
Start: 2020-11-19 | End: 2020-11-30 | Stop reason: HOSPADM

## 2020-11-19 RX ORDER — MORPHINE SULFATE 4 MG/ML
4 INJECTION, SOLUTION INTRAMUSCULAR; INTRAVENOUS ONCE
Status: COMPLETED | OUTPATIENT
Start: 2020-11-19 | End: 2020-11-19

## 2020-11-19 RX ADMIN — MORPHINE SULFATE 4 MG: 4 INJECTION, SOLUTION INTRAMUSCULAR; INTRAVENOUS at 14:43

## 2020-11-19 RX ADMIN — KETOROLAC TROMETHAMINE 30 MG: 30 INJECTION, SOLUTION INTRAMUSCULAR; INTRAVENOUS at 13:48

## 2020-11-19 ASSESSMENT — PAIN SCALES - GENERAL
PAINLEVEL_OUTOF10: 9

## 2020-11-19 ASSESSMENT — PAIN DESCRIPTION - ONSET: ONSET: ON-GOING

## 2020-11-19 ASSESSMENT — PAIN DESCRIPTION - LOCATION
LOCATION: ABDOMEN
LOCATION: ABDOMEN;BACK

## 2020-11-19 ASSESSMENT — ENCOUNTER SYMPTOMS
NAUSEA: 1
COUGH: 0
COLOR CHANGE: 0
BACK PAIN: 1

## 2020-11-19 ASSESSMENT — PAIN DESCRIPTION - PAIN TYPE
TYPE: ACUTE PAIN
TYPE: ACUTE PAIN;CHRONIC PAIN

## 2020-11-19 ASSESSMENT — PAIN DESCRIPTION - FREQUENCY: FREQUENCY: CONTINUOUS

## 2020-11-19 ASSESSMENT — PAIN DESCRIPTION - PROGRESSION: CLINICAL_PROGRESSION: GRADUALLY WORSENING

## 2020-11-19 ASSESSMENT — PAIN DESCRIPTION - ORIENTATION: ORIENTATION: LOWER
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
## 2020-11-19 NOTE — TELEPHONE ENCOUNTER
Patient is calling in stating that she is having really bad pain in groin area and back. She wants to know what should she do. She is wondering if she could get some Lyrica sent in. She said she wants to be transferred to [facility] because [facility] is not doing anything for her. She stated that this is an emergency and wants a response back asap.

## 2020-11-19 NOTE — TELEPHONE ENCOUNTER
Is the patient currently in the hospital?    If not, if she is having severe pain in her groin and back I would recommend ER evaluation in [facility]. She can request to be taken there rather than [place]. otherwise, I am not able to prescribe pain medication to her at this point given recent symptoms and issues. Can refer her to a pain management specialist if needed.

## 2020-11-19 NOTE — ED PROVIDER NOTES
[facility] ED  eMERGENCY dEPARTMENT eNCOUnter      Pt Name: Terrie Huang  MRN: [id number]  [place] 1955  Date of evaluation: 11/19/2020  Provider: Ny Leyva MD    [address] [place] [address]       Chief Complaint   Patient presents with    Dizziness     seen here multiple times for the same complaint.  Constipation     states supposed to have an outpatient xray          HISTORY OF PRESENT ILLNESS   (Location/Symptom, Timing/Onset,Context/Setting, Quality, Duration, Modifying Factors, Severity)  Note limiting factors. Terrie Huang is a 59 y.o. female who presents to the emergency department ongoing complaints of pain in the groin and right hip which she stated that she believes is from a pinched nerve in the back. She has history of chronic back issues. She has nausea. Anorexia. No fever. No dysuria or hematuria. Patient was evaluated less than 1 week ago including CT of abdomen pelvis without acute finding. There was a stable kidney stone within the right kidney not causing any problems. She also complains of lightheadedness when she rises and when she strains on commode. She has not passed out or fallen. She has complained of constipation and has been taking MiraLAX and Dulcolax without relief. Contacted her physician to see if she could obtain prescription for Lyrica and was referred to ED. No cough congestion difficulty breathing or chest pain. No anterior abdominal pain. Pain does not radiate down the lower extremities. No bowel or bladder issues other than the chronic constipation. No saddle anesthesia. Patient/family complaining of visiting frequently without diagnosis or control of her symptoms. The history is provided by the patient and medical records. NursingNotes were reviewed. REVIEW OF SYSTEMS    (2-9 systems for level 4, 10 or more for level 5)     Review of Systems   Constitutional: Negative for fever. HENT: Negative for congestion.     Respiratory: Negative for cough. Gastrointestinal: Positive for nausea. Genitourinary: Negative for dysuria and hematuria. Musculoskeletal: Positive for back pain. Skin: Negative for color change. Neurological: Positive for dizziness and light-headedness. Negative for syncope. Except as noted above the remainder of the review of systems was reviewed and negative. 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 Bryant Montanez Osteopenia     Sciatica     Venous insufficiency          SURGICALHISTORY       Past Surgical History:   Procedure Laterality Date    COLONOSCOPY      CYSTOSCOPY N/A 2014    x2    FOREIGN BODY REMOVAL Right 04/21/15    PARTIAL, GROIN    HERNIA REPAIR      double hernia oct. 2 2013    HYSTERECTOMY  7/14/11    bso [place]    KIDNEY STONE SURGERY      x2    OTHER SURGICAL HISTORY      laser surgery facundo dx    AR COLON CA SCRN NOT HI RSK IND N/A 4/19/2017    COLONOSCOPY performed by Yuri Myers MD at [address] N/A 10/19/2016    ANAL PROCTO SIGMOIDOSCOPY RIGID / excision perineal nodule performed by Amber Sanders MD at [address] [place] [address]       Previous Medications    ALBUTEROL SULFATE HFA (PROAIR HFA) 108 (90 BASE) MCG/ACT INHALER    Inhale 2 puffs into the lungs every 6 hours as needed for Wheezing or Shortness of Breath    ALBUTEROL SULFATE  (90 BASE) MCG/ACT INHALER    Inhale 2 puffs into the lungs every 6 hours as needed for Wheezing    CALCIUM-VITAMIN D (OSCAL 500/200 D-3) 500-200 MG-UNIT PER TABLET    Take 1 tablet by mouth 2 times daily.     CIPROFLOXACIN (CIPRO) 250 MG TABLET    Take 1 tablet by mouth 2 times daily for 7 days    CLOBETASOL (TEMOVATE) 0.05 % CREAM    Apply topically 2 times daily. ESTRADIOL (ESTRACE) 0.1 MG/GM VAGINAL CREAM    Place vaginally    LIDOCAINE 4 % EXTERNAL PATCH    Place 1 patch onto the skin daily    MASKS (FACE MASK EARLOOP-STYLE) MISC    1 Device by Does not apply route once for 1 dose    MELOXICAM (MOBIC) 7.5 MG TABLET    TAKE 1 TABLET BY MOUTH TWICE DAILY    METH-HYO-M BL-NA PHOS-PH SAL (URIBEL) 118 MG CAPS    TAKE 1 CAPSULE BY MOUTH 4 TIMES DAILY AS NEEDED FOR BLADDER PAIN URGENCY    MIDODRINE (PROAMATINE) 2.5 MG TABLET    Take 1 tablet by mouth 2 times daily (with meals)    MIRABEGRON (MYRBETRIQ) 50 MG TB24    Lot: [id number] Exp: 4/2022    NALOXONE 4 MG/0.1ML LIQD NASAL SPRAY    1 spray by Nasal route as needed for Opioid Reversal    ONDANSETRON (ZOFRAN ODT) 4 MG DISINTEGRATING TABLET    Take 1 tablet by mouth every 8 hours as needed for Nausea    PROBIOTIC ACIDOPHILUS (FLORANEX) TABS    Take 1 tablet by mouth 2 times daily    PSYLLIUM (METAMUCIL) 0.52 G CAPSULE    Take 1 capsule by mouth daily    THERAPEUTIC MULTIVITAMIN-MINERALS (THERAGRAN-M) TABLET    Take 1 tablet by mouth daily. TRAMADOL (ULTRAM) 50 MG TABLET    Take 1 tablet by mouth every 8 hours as needed for Pain for up to 5 days. ZTLIDO 1.8 % PTCH    APPLY UP TO 2 PATCHES OVER PAINFUL AREAS OF SPINE FOR 12 HOURS THEN REMOVE FOR 12 HOURS       ALLERGIES     Macrobid [nitrofurantoin monohydrate macrocrystals]; Nitrofurantoin;  Bactrim [sulfamethoxazole-trimethoprim]; and Pcn [penicillins]    FAMILY HISTORY       Family History   Problem Relation Age of Onset    Heart Disease Mother     Other Mother         dementia    Arthritis Father           SOCIAL HISTORY       Social History     Socioeconomic History    Marital status:      Spouse name: None    Number of children: None    Years of education: None    Highest education level: None   Occupational History    Occupation: cook   Social Needs    Financial resource strain: None    Food insecurity     Worry: None     Inability: None    Transportation needs     Medical: None     Non-medical: None   Tobacco Use    Smoking status: Current Every Day Smoker     Packs/day: 0.50     Years: 20.00     Pack years: 10.00     Types: Cigarettes    Smokeless tobacco: Never Used    Tobacco comment: relaxes me   Substance and Sexual Activity    Alcohol use: Yes     Alcohol/week: 5.0 standard drinks     Types: 5 Cans of beer per week     Comment: social    Drug use: No    Sexual activity: Not Currently     Partners: Male   Lifestyle    Physical activity     Days per week: None     Minutes per session: None    Stress: None   Relationships    Social connections     Talks on phone: None     Gets together: None     Attends Alevism service: None     Active member of club or organization: None     Attends meetings of clubs or organizations: None     Relationship status: None    Intimate partner violence     Fear of current or ex partner: None     Emotionally abused: None     Physically abused: None     Forced sexual activity: None   Other Topics Concern    None   Social History Narrative    ** Merged History Encounter **            SCREENINGS      @FLOW([id number])@      PHYSICAL EXAM    (up to 7 for level 4, 8 or more for level 5)     ED Triage Vitals [11/19/20 1320]   BP Temp Temp Source Pulse Resp SpO2 Height Weight   (!) 154/89 98.4 °F (36.9 °C) Oral 93 17 97 % -- --       Physical Exam  This is a well-developed well-nourished patient without distress. Conjunctivae are clear. No visible sinus discharge. No significant facial swelling. Neck supple without visible JVD. Lungs are clear and symmetric without distress. Regular rate and rhythm without murmurs. Abdomen soft. Bowel sounds active. No tenderness or rebound on examination. No masses or hepatosplenomegaly. No CVA or flank tenderness.   Right groin pain to palpation and motion but no hernia skin redness or inflammation or signs of infection. Painful range of motion of the right hip. No acute back tenderness no acute skin rash. No significant joint swelling or effusions. No leg or ankle edema or signs of phlebitis. Vascular status intact in extremities. Patient is awake alert and appropriate. Patient moves all extremities symmetrically without focal weakness or sensory loss. Specifically strength sensation vascular status and reflexes intact in both lower extremities. Mood appears normal without signs of anxiety or depression. DIAGNOSTIC RESULTS     EKG: All EKG's are interpreted by the Emergency Department Physician who either signs or Co-signsthis chart in the absence of a cardiologist.    ECG shows baseline wander/tremor. No acute ischemic change or arrhythmia. Normal sinus rhythm heart rate 87 bpm.  This is interpreted by myself. RADIOLOGY:   Non-plain filmimages such as CT, Ultrasound and MRI are read by the radiologist. Plain radiographic images are visualized and preliminarily interpreted by the emergency physician with the below findings:    KUB shows nonspecific gas pattern nonobstructive in nature. No severe constipation. X-ray of the right hip show mild degenerative changes without fracture. X-ray lumbar spine showing mild degenerative changes without fracture. These are as interpreted by myself.     Interpretation per the Radiologist below, if available at the time ofthis note:    XR ABDOMEN (KUB) (SINGLE AP VIEW)    (Results Pending)   XR LUMBAR SPINE (2-3 VIEWS)    (Results Pending)   XR HIP 2-3 VW W PELVIS RIGHT    (Results Pending)         ED BEDSIDE ULTRASOUND:   Performed by ED Physician - none    LABS:  Labs Reviewed   CBC WITH AUTO DIFFERENTIAL - Abnormal; Notable for the following components:       Result Value    RDW 14.6 (*)     All other components within normal limits   COMPREHENSIVE METABOLIC PANEL - Abnormal; Notable for the following components:    Glucose 105 (*) DISCHARGE MEDICATIONS:  New Prescriptions    PREGABALIN (LYRICA) 50 MG CAPSULE    Take 1 capsule by mouth 3 times daily for 3 days.           (Please note that portions of this note were completed with a voice recognitionprogram.  Efforts were made to edit the dictations but occasionally words are mis-transcribed.)    Silver Thrasher MD (electronically signed)  Attending Emergency Physician         Jacinto Keys MD  11/19/20 4860

## 2020-11-23 ENCOUNTER — VIRTUAL VISIT (OUTPATIENT)
Dept: GASTROENTEROLOGY | Age: 65
End: 2020-11-23
Payer: MEDICARE

## 2020-11-23 PROCEDURE — 99213 OFFICE O/P EST LOW 20 MIN: CPT | Performed by: NURSE PRACTITIONER

## 2020-11-23 RX ORDER — POLYETHYLENE GLYCOL 3350, SODIUM CHLORIDE, SODIUM BICARBONATE, POTASSIUM CHLORIDE 420; 11.2; 5.72; 1.48 G/4L; G/4L; G/4L; G/4L
4000 POWDER, FOR SOLUTION ORAL ONCE
Qty: 1 BOTTLE | Refills: 0 | Status: SHIPPED | OUTPATIENT
Start: 2020-11-23 | End: 2020-11-23

## 2020-11-23 ASSESSMENT — ENCOUNTER SYMPTOMS
WHEEZING: 0
NAUSEA: 0
EYE REDNESS: 0
VOICE CHANGE: 0
ANAL BLEEDING: 0
SHORTNESS OF BREATH: 0
BLOOD IN STOOL: 0
TROUBLE SWALLOWING: 0
PHOTOPHOBIA: 0
CONSTIPATION: 1
DIARRHEA: 0
RECTAL PAIN: 0
ABDOMINAL PAIN: 1
VOMITING: 0
ABDOMINAL DISTENTION: 0
COLOR CHANGE: 0
EYE PAIN: 0
CHEST TIGHTNESS: 0

## 2020-11-23 NOTE — PROGRESS NOTES
2020    TELEHEALTH EVALUATION -- Audio/Visual (During [facility] public [facility] emergency)    Due to COVID 19 outbreak, patient's office visit was converted to a virtual visit. Patient was contacted and agreed to proceed with a virtual visit via Telephone Visit, 15 minutes  The risks and benefits of converting to a virtual visit were discussed in light of the current infectious disease epidemic. Patient also understood that insurance coverage and co-pays are up to their individual insurance plans. HPI:    Lashanda Martinez (:  1955) has requested an audio/video evaluation for the following concern(s): Patient came in today to establish GI care as follow-up to recent emergency department visit with complaints of constipation, she reports a 1 year history of progressively worsening constipation with an associated 10 pound weight loss. She does report intermittent associated lower abdominal cramping typically relieved by bowel movements no Rectal bleeding or melena ,nocturnal pain or progressive abdominal pain. Denies unexplained weight loss, fever, or other systemic symptoms. No First-degree relative with  colorectal cancer. Not on anticoagulants or antiaggregant therapy. Has tried MiraLAX, Dulcolax, fiber in the past for good relief. recent CT of the abdomen and pelvis essentially unremarkable, recent KUB notable for stool and gas throughout the colon. Recent CBC and CMP noted. Endoscopic Hx:  colonoscopy 2017 Dr Irineo Jordan  Sigmoid diverticulosis    Review of Systems   Constitutional: Negative for appetite change, chills, fever and unexpected weight change. HENT: Negative for nosebleeds, tinnitus, trouble swallowing and voice change. Eyes: Negative for photophobia, pain and redness. Respiratory: Negative for chest tightness, shortness of breath and wheezing. Cardiovascular: Negative for chest pain, palpitations and leg swelling.    Gastrointestinal: Positive for abdominal pain (cramping) and constipation. Negative for abdominal distention, anal bleeding, blood in stool, diarrhea, nausea, rectal pain and vomiting. Endocrine: Negative for polydipsia, polyphagia and polyuria. Genitourinary: Negative for difficulty urinating and hematuria. Skin: Negative for color change, pallor and rash. Neurological: Negative for dizziness, speech difficulty and headaches. Psychiatric/Behavioral: Negative for confusion and suicidal ideas. Prior to Visit Medications    Medication Sig Taking? Authorizing Provider   polyethylene glycol-electrolytes (NULYTELY) 420 g solution Take 4,000 mLs by mouth once for 1 dose Yes LINSEY Enrique CNP   estradiol (ESTRACE) 0.1 MG/GM vaginal cream Place vaginally Yes Historical Provider, MD   ZTLIDO 1.8 % PTCH APPLY UP TO 2 PATCHES OVER PAINFUL AREAS OF SPINE FOR 12 HOURS THEN REMOVE FOR 12 HOURS Yes Historical Provider, MD   Meth-Hyo-M Bl-Na Phos-Ph Sal (URIBEL) 118 MG CAPS TAKE 1 CAPSULE BY MOUTH 4 TIMES DAILY AS NEEDED FOR BLADDER PAIN URGENCY Yes LINSEY Pablo CNP   Probiotic Acidophilus (FLORANEX) TABS Take 1 tablet by mouth 2 times daily Yes LINSEY Pablo CNP   psyllium (METAMUCIL) 0.52 g capsule Take 1 capsule by mouth daily Yes LINSEY Pablo CNP   ondansetron (ZOFRAN ODT) 4 MG disintegrating tablet Take 1 tablet by mouth every 8 hours as needed for Nausea Yes Carlee Stiles MD   clobetasol (TEMOVATE) 0.05 % cream Apply topically 2 times daily.  Yes LINSEY Pablo CNP   mirabegron (MYRBETRIQ) 50 MG TB24 Lot: [id number] Exp: 4/2022 Yes James Guido MD   meloxicam (MOBIC) 7.5 MG tablet TAKE 1 TABLET BY MOUTH TWICE DAILY Yes Historical Provider, MD   lidocaine 4 % external patch Place 1 patch onto the skin daily Yes Historical Provider, MD   midodrine (PROAMATINE) 2.5 MG tablet Take 1 tablet by mouth 2 times daily (with meals) Yes Panda Gruber MD   naloxone 4 MG/0.1ML LIQD nasal spray 1 spray by Nasal route as needed for Opioid Reversal Yes Dilan Cook MD   albuterol sulfate  (90 Base) MCG/ACT inhaler Inhale 2 puffs into the lungs every 6 hours as needed for Wheezing Yes Rea Opitz Riedy, APRN - CNP   albuterol sulfate HFA (PROAIR HFA) 108 (90 Base) MCG/ACT inhaler Inhale 2 puffs into the lungs every 6 hours as needed for Wheezing or Shortness of Breath Yes Rea Opitz Riedy, APRN - CNP   calcium-vitamin D (OSCAL 500/200 D-3) 500-200 MG-UNIT per tablet Take 1 tablet by mouth 2 times daily. Yes Historical Provider, MD   therapeutic multivitamin-minerals (THERAGRAN-M) tablet Take 1 tablet by mouth daily. Yes Historical Provider, MD   ciprofloxacin (CIPRO) 250 MG tablet Take 1 tablet by mouth 2 times daily for 7 days  Patient not taking: Reported on 11/23/2020  Rea Opitz Riedy, APRN - CNP   Masks (FACE MASK EARLOOP-STYLE) MISC 1 Device by Does not apply route once for 1 dose  Rea Opitz Riedy, APRN - CNP       Social History     Tobacco Use    Smoking status: Current Every Day Smoker     Packs/day: 0.50     Years: 20.00     Pack years: 10.00     Types: Cigarettes    Smokeless tobacco: Never Used    Tobacco comment: relaxes me   Substance Use Topics    Alcohol use:  Yes     Alcohol/week: 5.0 standard drinks     Types: 5 Cans of beer per week     Comment: social    Drug use: No        Allergies   Allergen Reactions    Macrobid [Nitrofurantoin Monohydrate Macrocrystals] Swelling    Nitrofurantoin Swelling    Bactrim [Sulfamethoxazole-Trimethoprim]     Pcn [Penicillins] Swelling     Rash and swelling of tongue c hospitalization yrs ago; no resp distress   ,   Past Medical History:   Diagnosis Date    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 Saulo Conway Osteopenia     Sciatica     Venous insufficiency ,   Past Surgical History:   Procedure Laterality Date    COLONOSCOPY      CYSTOSCOPY N/A 2014    x2    FOREIGN BODY REMOVAL Right 04/21/15    PARTIAL, GROIN    HERNIA REPAIR      double hernia oct. 2 2013    HYSTERECTOMY  7/14/11    [place]so [place]    KIDNEY STONE SURGERY      x2    OTHER SURGICAL HISTORY      laser surgery facundo dx    [place] COLON CA SCRN NOT HI RSK IND N/A 4/19/2017    COLONOSCOPY performed by Alger Soulier, MD at [address] N/A 10/19/2016    ANAL PROCTO SIGMOIDOSCOPY RIGID / excision perineal nodule performed by Kelly Doran MD at [address] [place] [address]     ,   Social History     Tobacco Use    Smoking status: Current Every Day Smoker     Packs/day: 0.50     Years: 20.00     Pack years: 10.00     Types: Cigarettes    Smokeless tobacco: Never Used    Tobacco comment: relaxes me   Substance Use Topics    Alcohol use:  Yes     Alcohol/week: 5.0 standard drinks     Types: 5 Cans of beer per week     Comment: social    Drug use: No   ,   Family History   Problem Relation Age of Onset    Heart Disease Mother     Other Mother         dementia    Arthritis Father        PHYSICAL EXAMINATION:  [ INSTRUCTIONS:  \"[x]\" Indicates a positive item  \"[]\" Indicates a negative item  -- DELETE ALL ITEMS NOT EXAMINED]  [] Alert  [] Oriented to person/place/time    [] No apparent distress  [] Toxic appearing    [] Face flushed appearing [] Sclera clear  [] Lips are cyanotic      [] Breathing appears normal  [] Appears tachypneic      [] Rash on visible skin    [] Cranial Nerves II-XII grossly intact    [] Motor grossly intact in visible upper extremities    [] Motor grossly intact in visible lower extremities    [] Normal Mood  [] Anxious appearing    [] Depressed appearing  [] Confused appearing      [] Poor short term memory  [] Poor long term memory    [] OTHER:      Due to this being a TeleHealth encounter, evaluation of the following organ systems is limited: Vitals/Constitutional/EENT/Resp/CV/GI//MS/Neuro/Skin/Heme-Lymph-Imm. ASSESSMENT/PLAN:  1. Encounter for diagnostic colonoscopy due to change in bowel habits/constipation  1 year history of progressively worsening constipation associated with lower abdominal cramping and unintentional 10 pound weight loss. Has tried MiraLAX, Dulcolax, fiber in the past for good relief. no Rectal bleeding or melena ,nocturnal pain or progressive abdominal pain. Denies unexplained weight loss, fever, or other systemic symptoms. No First-degree relative with  colorectal cancer. Not on anticoagulants or antiaggregant therapy. recent CT of the abdomen and pelvis essentially unremarkable, recent KUB notable for stool and gas throughout the colon. Recent CBC and CMP noted. Carries a diagnosis of IBS-C and opioid-induced constipation however her symptoms have been well managed since 2017.  -MiraLAX 17 g twice daily titrate to response  -Colace 100 mg once to twice daily titrate to response  -Daily fiber supplement  -Adequate water and physical activity daily  - Colonoscopy requested to assess for any mucosal or luminal etiologies, indications and outcomes discussed at length, all of her questions were answered and she agrees to proceed with constipation 2-day clear 2-day prep to ensure adequate exam prep adherence was discussed at length  - polyethylene glycol-electrolytes (NULYTELY) 420 g solution; Take 4,000 mLs by mouth once for 1 dose  Dispense: 1 Bottle; Refill: 0  The patient was counseled at length about the risks of adams Covid-19 during their perioperative period and any recovery window from their procedure. The patient was made aware that adams Covid-19  may worsen their prognosis for recovering from their procedure  and lend to a higher morbidity and/or mortality risk. All material risks, benefits, and reasonable alternatives including postponing the procedure were discussed.  The patient does

## 2020-11-24 ENCOUNTER — APPOINTMENT (OUTPATIENT)
Dept: PHYSICAL THERAPY | Age: 65
End: 2020-11-24
Payer: MEDICARE

## 2020-11-25 ENCOUNTER — TELEPHONE (OUTPATIENT)
Dept: FAMILY MEDICINE CLINIC | Age: 65
End: 2020-11-25

## 2020-11-25 NOTE — TELEPHONE ENCOUNTER
In Er four days ago for pelvic/back pain, blood work and urine and xrays of back. No appetite, not sleeping well and they were put on Cymbalta. She is wondering if that could be reactions from the medicine. Please advise.

## 2020-11-26 ENCOUNTER — HOSPITAL ENCOUNTER (INPATIENT)
Age: 65
LOS: 4 days | Discharge: HOME HEALTH | DRG: 690 | End: 2020-11-30
Attending: EMERGENCY MEDICINE | Admitting: INTERNAL MEDICINE
Payer: MEDICARE

## 2020-11-26 PROBLEM — N39.0 UTI (URINARY TRACT INFECTION): Status: ACTIVE | Noted: 2020-11-26

## 2020-11-26 LAB
ALBUMIN SERPL-MCNC: 4.8 G/DL (ref 3.5–4.6)
ALP BLD-CCNC: 97 U/L (ref 40–130)
ALT SERPL-CCNC: 19 U/L (ref 0–33)
ANION GAP SERPL CALCULATED.3IONS-SCNC: 13 MEQ/L (ref 9–15)
AST SERPL-CCNC: 20 U/L (ref 0–35)
BACTERIA: ABNORMAL /HPF
BASOPHILS ABSOLUTE: 0 K/UL (ref 0–0.2)
BASOPHILS RELATIVE PERCENT: 0.2 %
BILIRUB SERPL-MCNC: 0.4 MG/DL (ref 0.2–0.7)
BILIRUBIN URINE: ABNORMAL
BLOOD, URINE: ABNORMAL
BUN BLDV-MCNC: 23 MG/DL (ref 8–23)
CALCIUM SERPL-MCNC: 10.5 MG/DL (ref 8.5–9.9)
CHLORIDE BLD-SCNC: 100 MEQ/L (ref 95–107)
CLARITY: CLEAR
CO2: 27 MEQ/L (ref 20–31)
COLOR: ABNORMAL
CREAT SERPL-MCNC: 0.6 MG/DL (ref 0.5–0.9)
EKG ATRIAL RATE: 92 BPM
EKG P AXIS: 66 DEGREES
EKG P-R INTERVAL: 142 MS
EKG Q-T INTERVAL: 354 MS
EKG QRS DURATION: 78 MS
EKG QTC CALCULATION (BAZETT): 437 MS
EKG R AXIS: -45 DEGREES
EKG T AXIS: 53 DEGREES
EKG VENTRICULAR RATE: 92 BPM
EOSINOPHILS ABSOLUTE: 0 K/UL (ref 0–0.7)
EOSINOPHILS RELATIVE PERCENT: 0.4 %
EPITHELIAL CELLS, UA: ABNORMAL /HPF
GFR AFRICAN AMERICAN: >60
GFR NON-AFRICAN AMERICAN: >60
GLOBULIN: 2.9 G/DL (ref 2.3–3.5)
GLUCOSE BLD-MCNC: 109 MG/DL (ref 70–99)
GLUCOSE URINE: NEGATIVE MG/DL
HCT VFR BLD CALC: 42.7 % (ref 37–47)
HEMOGLOBIN: 14.1 G/DL (ref 12–16)
KETONES, URINE: 40 MG/DL
LEUKOCYTE ESTERASE, URINE: ABNORMAL
LYMPHOCYTES ABSOLUTE: 1.1 K/UL (ref 1–4.8)
LYMPHOCYTES RELATIVE PERCENT: 13.8 %
MCH RBC QN AUTO: 29.4 PG (ref 27–31.3)
MCHC RBC AUTO-ENTMCNC: 33.1 % (ref 33–37)
MCV RBC AUTO: 89 FL (ref 82–100)
MONOCYTES ABSOLUTE: 0.5 K/UL (ref 0.2–0.8)
MONOCYTES RELATIVE PERCENT: 6 %
NEUTROPHILS ABSOLUTE: 6.1 K/UL (ref 1.4–6.5)
NEUTROPHILS RELATIVE PERCENT: 79.6 %
NITRITE, URINE: NEGATIVE
PDW BLD-RTO: 14.1 % (ref 11.5–14.5)
PH UA: 6.5 (ref 5–9)
PLATELET # BLD: 348 K/UL (ref 130–400)
POTASSIUM REFLEX MAGNESIUM: 3.9 MEQ/L (ref 3.4–4.9)
PROTEIN UA: NEGATIVE MG/DL
RBC # BLD: 4.8 M/UL (ref 4.2–5.4)
RENAL EPITHELIAL, UA: ABNORMAL /HPF
SODIUM BLD-SCNC: 140 MEQ/L (ref 135–144)
SPECIFIC GRAVITY UA: 1.01 (ref 1–1.03)
TOTAL PROTEIN: 7.7 G/DL (ref 6.3–8)
TROPONIN: <0.01 NG/ML (ref 0–0.01)
URINE REFLEX TO CULTURE: YES
UROBILINOGEN, URINE: 0.2 E.U./DL
WBC # BLD: 7.7 K/UL (ref 4.8–10.8)
WBC UA: ABNORMAL /HPF (ref 0–5)

## 2020-11-26 PROCEDURE — G0378 HOSPITAL OBSERVATION PER HR: HCPCS

## 2020-11-26 PROCEDURE — 6360000002 HC RX W HCPCS: Performed by: INTERNAL MEDICINE

## 2020-11-26 PROCEDURE — 93005 ELECTROCARDIOGRAM TRACING: CPT

## 2020-11-26 PROCEDURE — 80053 COMPREHEN METABOLIC PANEL: CPT

## 2020-11-26 PROCEDURE — 2580000003 HC RX 258: Performed by: INTERNAL MEDICINE

## 2020-11-26 PROCEDURE — 6370000000 HC RX 637 (ALT 250 FOR IP): Performed by: EMERGENCY MEDICINE

## 2020-11-26 PROCEDURE — 1210000000 HC MED SURG R&B

## 2020-11-26 PROCEDURE — 99285 EMERGENCY DEPT VISIT HI MDM: CPT

## 2020-11-26 PROCEDURE — 87086 URINE CULTURE/COLONY COUNT: CPT

## 2020-11-26 PROCEDURE — 96365 THER/PROPH/DIAG IV INF INIT: CPT

## 2020-11-26 PROCEDURE — 81001 URINALYSIS AUTO W/SCOPE: CPT

## 2020-11-26 PROCEDURE — 6360000002 HC RX W HCPCS: Performed by: EMERGENCY MEDICINE

## 2020-11-26 PROCEDURE — 96367 TX/PROPH/DG ADDL SEQ IV INF: CPT

## 2020-11-26 PROCEDURE — 84484 ASSAY OF TROPONIN QUANT: CPT

## 2020-11-26 PROCEDURE — 85025 COMPLETE CBC W/AUTO DIFF WBC: CPT

## 2020-11-26 PROCEDURE — 6370000000 HC RX 637 (ALT 250 FOR IP): Performed by: INTERNAL MEDICINE

## 2020-11-26 PROCEDURE — 36415 COLL VENOUS BLD VENIPUNCTURE: CPT

## 2020-11-26 PROCEDURE — 2580000003 HC RX 258: Performed by: EMERGENCY MEDICINE

## 2020-11-26 RX ORDER — LEVOFLOXACIN 5 MG/ML
500 INJECTION, SOLUTION INTRAVENOUS EVERY 24 HOURS
Status: DISCONTINUED | OUTPATIENT
Start: 2020-11-26 | End: 2020-11-29

## 2020-11-26 RX ORDER — L. ACIDOPHILUS/L.BULGARICUS 1MM CELL
1 TABLET ORAL 2 TIMES DAILY
Status: DISCONTINUED | OUTPATIENT
Start: 2020-11-26 | End: 2020-11-30 | Stop reason: HOSPADM

## 2020-11-26 RX ORDER — DULOXETIN HYDROCHLORIDE 30 MG/1
30 CAPSULE, DELAYED RELEASE ORAL DAILY
Status: ON HOLD | COMMUNITY
End: 2020-11-30 | Stop reason: HOSPADM

## 2020-11-26 RX ORDER — POLYETHYLENE GLYCOL 3350 17 G/17G
17 POWDER, FOR SOLUTION ORAL DAILY PRN
Status: DISCONTINUED | OUTPATIENT
Start: 2020-11-26 | End: 2020-11-30 | Stop reason: HOSPADM

## 2020-11-26 RX ORDER — ACETAMINOPHEN 500 MG
1000 TABLET ORAL ONCE
Status: COMPLETED | OUTPATIENT
Start: 2020-11-26 | End: 2020-11-26

## 2020-11-26 RX ORDER — 0.9 % SODIUM CHLORIDE 0.9 %
1000 INTRAVENOUS SOLUTION INTRAVENOUS ONCE
Status: COMPLETED | OUTPATIENT
Start: 2020-11-26 | End: 2020-11-26

## 2020-11-26 RX ORDER — SODIUM CHLORIDE 9 MG/ML
INJECTION, SOLUTION INTRAVENOUS CONTINUOUS
Status: DISPENSED | OUTPATIENT
Start: 2020-11-26 | End: 2020-11-27

## 2020-11-26 RX ORDER — ALBUTEROL SULFATE 2.5 MG/3ML
2.5 SOLUTION RESPIRATORY (INHALATION) EVERY 4 HOURS PRN
Status: DISCONTINUED | OUTPATIENT
Start: 2020-11-26 | End: 2020-11-30 | Stop reason: HOSPADM

## 2020-11-26 RX ORDER — SODIUM CHLORIDE 0.9 % (FLUSH) 0.9 %
10 SYRINGE (ML) INJECTION PRN
Status: DISCONTINUED | OUTPATIENT
Start: 2020-11-26 | End: 2020-11-30 | Stop reason: HOSPADM

## 2020-11-26 RX ORDER — ACETAMINOPHEN 650 MG/1
650 SUPPOSITORY RECTAL EVERY 6 HOURS PRN
Status: DISCONTINUED | OUTPATIENT
Start: 2020-11-26 | End: 2020-11-30

## 2020-11-26 RX ORDER — MIDODRINE HYDROCHLORIDE 2.5 MG/1
2.5 TABLET ORAL 2 TIMES DAILY WITH MEALS
Status: DISCONTINUED | OUTPATIENT
Start: 2020-11-26 | End: 2020-11-30 | Stop reason: HOSPADM

## 2020-11-26 RX ORDER — SODIUM CHLORIDE 0.9 % (FLUSH) 0.9 %
10 SYRINGE (ML) INJECTION EVERY 12 HOURS SCHEDULED
Status: DISCONTINUED | OUTPATIENT
Start: 2020-11-26 | End: 2020-11-30 | Stop reason: HOSPADM

## 2020-11-26 RX ORDER — ACETAMINOPHEN 325 MG/1
650 TABLET ORAL EVERY 6 HOURS PRN
Status: DISCONTINUED | OUTPATIENT
Start: 2020-11-26 | End: 2020-11-30 | Stop reason: HOSPADM

## 2020-11-26 RX ORDER — ONDANSETRON 4 MG/1
4 TABLET, ORALLY DISINTEGRATING ORAL EVERY 8 HOURS PRN
Status: DISCONTINUED | OUTPATIENT
Start: 2020-11-26 | End: 2020-11-30 | Stop reason: HOSPADM

## 2020-11-26 RX ORDER — MELOXICAM 7.5 MG/1
7.5 TABLET ORAL 2 TIMES DAILY
Status: DISCONTINUED | OUTPATIENT
Start: 2020-11-26 | End: 2020-11-30 | Stop reason: HOSPADM

## 2020-11-26 RX ORDER — ZOLPIDEM TARTRATE 5 MG/1
5 TABLET ORAL NIGHTLY PRN
Status: DISCONTINUED | OUTPATIENT
Start: 2020-11-26 | End: 2020-11-30 | Stop reason: HOSPADM

## 2020-11-26 RX ADMIN — MELOXICAM 7.5 MG: 7.5 TABLET ORAL at 21:04

## 2020-11-26 RX ADMIN — SODIUM CHLORIDE 1000 ML: 9 INJECTION, SOLUTION INTRAVENOUS at 14:18

## 2020-11-26 RX ADMIN — SODIUM CHLORIDE: 9 INJECTION, SOLUTION INTRAVENOUS at 21:07

## 2020-11-26 RX ADMIN — ACETAMINOPHEN 650 MG: 325 TABLET ORAL at 22:23

## 2020-11-26 RX ADMIN — SODIUM CHLORIDE 1000 ML: 9 INJECTION, SOLUTION INTRAVENOUS at 14:14

## 2020-11-26 RX ADMIN — LACTOBACILLUS TAB 1 TABLET: TAB at 21:04

## 2020-11-26 RX ADMIN — CEFTRIAXONE 2 G: 2 INJECTION, POWDER, FOR SOLUTION INTRAMUSCULAR; INTRAVENOUS at 18:48

## 2020-11-26 RX ADMIN — LEVOFLOXACIN 500 MG: 5 INJECTION, SOLUTION INTRAVENOUS at 21:01

## 2020-11-26 RX ADMIN — Medication 10 ML: at 21:07

## 2020-11-26 RX ADMIN — ACETAMINOPHEN 1000 MG: 500 TABLET ORAL at 16:35

## 2020-11-26 RX ADMIN — ZOLPIDEM TARTRATE 5 MG: 5 TABLET ORAL at 23:09

## 2020-11-26 ASSESSMENT — PAIN DESCRIPTION - DESCRIPTORS
DESCRIPTORS: ACHING
DESCRIPTORS: ACHING;CONSTANT;SHARP
DESCRIPTORS: ACHING

## 2020-11-26 ASSESSMENT — PAIN DESCRIPTION - PAIN TYPE
TYPE: CHRONIC PAIN

## 2020-11-26 ASSESSMENT — PAIN DESCRIPTION - ONSET
ONSET: ON-GOING

## 2020-11-26 ASSESSMENT — PAIN SCALES - GENERAL
PAINLEVEL_OUTOF10: 8
PAINLEVEL_OUTOF10: 9

## 2020-11-26 ASSESSMENT — PAIN DESCRIPTION - ORIENTATION: ORIENTATION: LOWER

## 2020-11-26 ASSESSMENT — PAIN DESCRIPTION - PROGRESSION
CLINICAL_PROGRESSION: NOT CHANGED
CLINICAL_PROGRESSION: NOT CHANGED

## 2020-11-26 ASSESSMENT — PAIN - FUNCTIONAL ASSESSMENT: PAIN_FUNCTIONAL_ASSESSMENT: PREVENTS OR INTERFERES SOME ACTIVE ACTIVITIES AND ADLS

## 2020-11-26 ASSESSMENT — PAIN DESCRIPTION - LOCATION
LOCATION: BACK
LOCATION: BACK;GROIN
LOCATION: BACK

## 2020-11-26 ASSESSMENT — PAIN DESCRIPTION - FREQUENCY: FREQUENCY: CONTINUOUS

## 2020-11-26 NOTE — ED NOTES
Family and contact numbers called for Pt pending d/c. Message left at one number, spoke with one family member who states they do not have a car and do not have any one to come get her at this time.      Nirmala Petty RN  11/26/20 [id number]

## 2020-11-26 NOTE — ED NOTES
Urine specimen obtained via bedpan. Urine green colored. Pt states she gets UTI's on a regular basis and requested for urine to be checked.        Pham Gallo RN  11/26/20 [id number]

## 2020-11-26 NOTE — ED PROVIDER NOTES
HPI:  11/26/20, Time: 1:35 PM JULIO Wellington is a 59 y.o. female presenting to the ED for dizzy and had fall at home, beginning 1 hour ago. The complaint has been persistent, moderate in severity, and worsened by standing. No fever no chills no nausea no vomiting. There is been no chest pain or shortness of breath no exertional angina no syncope she states that she was placed on Cymbalta several days ago and this is increased her anxiety level and made her dizzy it has also caused decreased appetite. ROS:   Pertinent positives and negatives are stated within HPI, all other systems reviewed and are negative.  --------------------------------------------- PAST HISTORY ---------------------------------------------  Past Medical History:  has a past medical history of Bowen's disease, Chronic back pain, Chronic kidney disease, COPD (chronic obstructive pulmonary disease) ([address] Irritable bowel syndrome with constipation, Lumbosacral spondylosis without myelopathy- Dr. Tiffany Gomes, Osteopenia, Sciatica, and Venous insufficiency. Past Surgical History:  has a past surgical history that includes Hysterectomy (7/14/11); other surgical history; hernia repair; Kidney stone surgery; Foreign Body Removal (Right, 04/21/15); Colonoscopy; Cystocopy (N/A, 2014); skin biopsy; Tonsillectomy; Sigmoidoscopy (N/A, 10/19/2016); and pr colon ca scrn not hi rsk ind (N/A, 4/19/2017). Social History:  reports that she has been smoking cigarettes. She has a 10.00 pack-year smoking history. She has never used smokeless tobacco. She reports current alcohol use of about 5.0 standard drinks of alcohol per week. She reports that she does not use drugs. Family History: family history includes Arthritis in her father; Heart Disease in her mother; Other in her mother. The patients home medications have been reviewed. Allergies: Macrobid [nitrofurantoin monohydrate macrocrystals]; Nitrofurantoin;  Bactrim [sulfamethoxazole-trimethoprim]; and Pcn [penicillins]    ---------------------------------------------------PHYSICAL EXAM--------------------------------------     Constitutional/General: Alert and oriented x3, well appearing, non toxic in NAD  Head: Normocephalic and atraumatic  Eyes: PERRL, EOMI no nystagmus no papilledema  Ears reveal no hemotympanum no congestion   mouth: Oropharynx clear, handling secretions, no trismus  Neck: Supple, full ROM, non tender to palpation in the midline, no stridor, no crepitus, no meningeal signs  Pulmonary: Lungs clear to auscultation bilaterally, no wheezes, rales, or rhonchi. Not in respiratory distress  Cardiovascular:  Regular rate. Regular rhythm. No murmurs, gallops, or rubs. 2+ distal pulses  Chest: no chest wall tenderness  Back exam reveals no midline tenderness of the thoracic no lumbar spine  Abdomen: Soft. Non tender. Non distended. +BS. No rebound, guarding, or rigidity. No pulsatile masses appreciated. Musculoskeletal: Moves all extremities x 4. Warm and well perfused, no clubbing, cyanosis, or edema. Capillary refill <3 seconds  Skin: warm and dry. No rashes. Neurologic: GCS 15, CN 2-12 grossly intact, no focal deficits, symmetric strength 5/5 in the upper and lower extremities bilaterally  Psych: Normal Affect    -------------------------------------------------- RESULTS -------------------------------------------------  I have personally reviewed all laboratory and imaging results for this patient. Results are listed below.      LABS:  Results for orders placed or performed during the hospital encounter of 11/26/20   CBC Auto Differential   Result Value Ref Range    WBC 7.7 4.8 - 10.8 K/uL    RBC 4.80 4.20 - 5.40 M/uL    Hemoglobin 14.1 12.0 - 16.0 g/dL    Hematocrit 42.7 37.0 - 47.0 %    MCV 89.0 82.0 - 100.0 fL    MCH 29.4 27.0 - 31.3 pg    MCHC 33.1 33.0 - 37.0 %    RDW 14.1 11.5 - 14.5 %    Platelets 793 033 - 077 K/uL    Neutrophils % 79.6 % Lymphocytes % 13.8 %    Monocytes % 6.0 %    Eosinophils % 0.4 %    Basophils % 0.2 %    Neutrophils Absolute 6.1 1.4 - 6.5 K/uL    Lymphocytes Absolute 1.1 1.0 - 4.8 K/uL    Monocytes Absolute 0.5 0.2 - 0.8 K/uL    Eosinophils Absolute 0.0 0.0 - 0.7 K/uL    Basophils Absolute 0.0 0.0 - 0.2 K/uL   Comprehensive Metabolic Panel w/ Reflex to MG   Result Value Ref Range    Sodium 140 135 - 144 mEq/L    Potassium reflex Magnesium 3.9 3.4 - 4.9 mEq/L    Chloride 100 95 - 107 mEq/L    CO2 27 20 - 31 mEq/L    Anion Gap 13 9 - 15 mEq/L    Glucose 109 (H) 70 - 99 mg/dL    BUN 23 8 - 23 mg/dL    CREATININE 0.60 0.50 - 0.90 mg/dL    GFR Non-African American >60.0 >60    GFR  >60.0 >60    Calcium 10.5 (H) 8.5 - 9.9 mg/dL    Total Protein 7.7 6.3 - 8.0 g/dL    Alb 4.8 (H) 3.5 - 4.6 g/dL    Total Bilirubin 0.4 0.2 - 0.7 mg/dL    Alkaline Phosphatase 97 40 - 130 U/L    ALT 19 0 - 33 U/L    AST 20 0 - 35 U/L    Globulin 2.9 2.3 - 3.5 g/dL   Troponin   Result Value Ref Range    Troponin <0.010 0.000 - 0.010 ng/mL   Urine Reflex to Culture    Specimen: Urine, clean catch   Result Value Ref Range    Color, UA GREEN (A) Straw/Yellow    Clarity, UA Clear Clear    Glucose, Ur Negative Negative mg/dL    Bilirubin Urine MODERATE (A) Negative    Ketones, Urine 40 (A) Negative mg/dL    Specific Gravity, UA 1.015 1.005 - 1.030    Blood, Urine TRACE (A) Negative    pH, UA 6.5 5.0 - 9.0    Protein, UA Negative Negative mg/dL    Urobilinogen, Urine 0.2 <2.0 E.U./dL    Nitrite, Urine Negative Negative    Leukocyte Esterase, Urine TRACE (A) Negative    Urine Reflex to Culture Yes    Microscopic Urinalysis   Result Value Ref Range    WBC, UA 10-20 (A) 0 - 5 /HPF    Epithelial Cells, UA 3-5 /HPF    Renal Epithelial, UA 0-2 /HPF    Bacteria, UA MODERATE (A) Negative /HPF   EKG 12 Lead   Result Value Ref Range    Ventricular Rate 92 BPM    Atrial Rate 92 BPM    P-R Interval 142 ms    QRS Duration 78 ms    Q-T Interval 354 ms    QTc Calculation (Ponce) 437 ms    P Axis 66 degrees    R Axis -45 degrees    T Axis 53 degrees       RADIOLOGY:  Interpreted by Radiologist.  No orders to display         EKG Interpretation  Interpreted by emergency department physician    Rhythm: normal sinus   Rate: normal  Axis: normal  Conduction: normal  ST Segments: no acute change  T Waves: no acute change    Clinical Impression: no acute changes  Comparison to prior EKG: stable as compared to patient's most recent EKG      ------------------------- NURSING NOTES AND VITALS REVIEWED ---------------------------   The nursing notes within the ED encounter and vital signs as below have been reviewed by myself. BP (!) 142/71   Pulse 85   Temp 98.8 °F (37.1 °C) (Oral)   Resp 17   Ht 5' 5\" (1.651 m)   Wt 135 lb (61.2 kg)   LMP  (LMP Unknown)   SpO2 96%   BMI 22.47 kg/m²   Oxygen Saturation Interpretation: Normal    The patients available past medical records and past encounters were reviewed. ------------------------------ ED COURSE/MEDICAL DECISION MAKING----------------------  Medications   cefTRIAXone (ROCEPHIN) 2 g IVPB in D5W 50ml minibag (has no administration in time range)   0.9 % sodium chloride bolus (0 mLs Intravenous Stopped 11/26/20 1606)   0.9 % sodium chloride bolus (0 mLs Intravenous Stopped 11/26/20 1510)   acetaminophen (TYLENOL) tablet 1,000 mg (1,000 mg Oral Given 11/26/20 1635)             Medical Decision Making:    Patient was not orthostatic EKG reveals normal sinus rhythm the patient displays a high level of anxiety. She was advised to stop Cymbalta and to follow-up with her PCP  She was orthostatic. I have ordered two liters of N.S. and will repEAT ORTHOSTATICS  After receiving 2 L of normal saline and the baseline blood pressure was improved she was still moderately orthostatic.   Initially her systolic pressure was 201 and dropped to a systolic of 60 after receiving 2 L her systolic blood pressure was 160 and decreased to 135mmhg    Was given Rocephin for UTI. Prior allergies were reviewed      Re-Evaluations:             Re-evaluation. Patients symptoms are slightly improved after receiving 2 L of normal saline however she is still orthostatic and feels mildly dizzy      Consultations:               A phone consult placed to the hospitalist to admit the patient          This patient's ED course included: re-evaluation prior to disposition, cardiac monitoring, continuous pulse oximetry, complex medical decision making and emergency management and a personal history and physicial eaxmination    This patient has remained hemodynamically stable, improved and been closely monitored during their ED course. Counseling: The emergency provider has spoken with the patient and discussed todays results, in addition to providing specific details for the plan of care and counseling regarding the diagnosis and prognosis. Questions are answered at this time and they are agreeable with the plan.       --------------------------------- IMPRESSION AND DISPOSITION ---------------------------------    IMPRESSION  1. Fall, initial encounter    2. Dizziness    3. Syncope and collapse    4. Orthostatic hypotension    5. Acute cystitis without hematuria        DISPOSITION  [facility]  Patient condition is good        NOTE: This report was transcribed using voice recognition software.  Every effort was made to ensure accuracy; however, inadvertent computerized transcription errors may be present          Majo Aldana MD  11/26/20 Kj Moody MD  11/26/20 [address], [place]  11/26/20 1717

## 2020-11-26 NOTE — ED TRIAGE NOTES
Pt from home for c/o fall at home and landed on her buttocks from a standing position. Pt began Cymbalta 4 days ago and has c/o feeling dizzy since beginning the medication. Pt wanted a bedpan upon arrival.  Pt plan of care explained to Pt during exam at bedside by Dr. Steve Orellana.

## 2020-11-26 NOTE — ED NOTES
Family called again and message left for Pt needing ride home. Pt given dinner tray. Appetite good.      Hellen Melo RN  11/26/20 7618

## 2020-11-27 ENCOUNTER — APPOINTMENT (OUTPATIENT)
Dept: CT IMAGING | Age: 65
DRG: 690 | End: 2020-11-27
Payer: MEDICARE

## 2020-11-27 PROBLEM — N12 PYELONEPHRITIS: Status: ACTIVE | Noted: 2020-11-27

## 2020-11-27 LAB
ANION GAP SERPL CALCULATED.3IONS-SCNC: 10 MEQ/L (ref 9–15)
BASOPHILS ABSOLUTE: 0 K/UL (ref 0–0.2)
BASOPHILS RELATIVE PERCENT: 0.5 %
BUN BLDV-MCNC: 13 MG/DL (ref 8–23)
CALCIUM SERPL-MCNC: 8.8 MG/DL (ref 8.5–9.9)
CHLORIDE BLD-SCNC: 109 MEQ/L (ref 95–107)
CO2: 23 MEQ/L (ref 20–31)
CORTISOL - AM: 12.6 UG/DL (ref 6.2–19.4)
CREAT SERPL-MCNC: 0.45 MG/DL (ref 0.5–0.9)
EOSINOPHILS ABSOLUTE: 0 K/UL (ref 0–0.7)
EOSINOPHILS RELATIVE PERCENT: 1 %
GFR AFRICAN AMERICAN: >60
GFR NON-AFRICAN AMERICAN: >60
GLUCOSE BLD-MCNC: 153 MG/DL (ref 70–99)
HCT VFR BLD CALC: 32.4 % (ref 37–47)
HEMOGLOBIN: 10.9 G/DL (ref 12–16)
LYMPHOCYTES ABSOLUTE: 1.2 K/UL (ref 1–4.8)
LYMPHOCYTES RELATIVE PERCENT: 25.8 %
MCH RBC QN AUTO: 29.5 PG (ref 27–31.3)
MCHC RBC AUTO-ENTMCNC: 33.8 % (ref 33–37)
MCV RBC AUTO: 87.4 FL (ref 82–100)
MONOCYTES ABSOLUTE: 0.6 K/UL (ref 0.2–0.8)
MONOCYTES RELATIVE PERCENT: 13.7 %
NEUTROPHILS ABSOLUTE: 2.6 K/UL (ref 1.4–6.5)
NEUTROPHILS RELATIVE PERCENT: 59 %
PDW BLD-RTO: 14.3 % (ref 11.5–14.5)
PLATELET # BLD: 287 K/UL (ref 130–400)
POTASSIUM REFLEX MAGNESIUM: 3.6 MEQ/L (ref 3.4–4.9)
RBC # BLD: 3.7 M/UL (ref 4.2–5.4)
SODIUM BLD-SCNC: 142 MEQ/L (ref 135–144)
WBC # BLD: 4.5 K/UL (ref 4.8–10.8)

## 2020-11-27 PROCEDURE — 82533 TOTAL CORTISOL: CPT

## 2020-11-27 PROCEDURE — 80048 BASIC METABOLIC PNL TOTAL CA: CPT

## 2020-11-27 PROCEDURE — 85025 COMPLETE CBC W/AUTO DIFF WBC: CPT

## 2020-11-27 PROCEDURE — 1210000000 HC MED SURG R&B

## 2020-11-27 PROCEDURE — 97530 THERAPEUTIC ACTIVITIES: CPT

## 2020-11-27 PROCEDURE — 2580000003 HC RX 258: Performed by: INTERNAL MEDICINE

## 2020-11-27 PROCEDURE — 73700 CT LOWER EXTREMITY W/O DYE: CPT

## 2020-11-27 PROCEDURE — 2500000003 HC RX 250 WO HCPCS: Performed by: INTERNAL MEDICINE

## 2020-11-27 PROCEDURE — 36415 COLL VENOUS BLD VENIPUNCTURE: CPT

## 2020-11-27 PROCEDURE — 97162 PT EVAL MOD COMPLEX 30 MIN: CPT

## 2020-11-27 PROCEDURE — 74177 CT ABD & PELVIS W/CONTRAST: CPT

## 2020-11-27 PROCEDURE — 97166 OT EVAL MOD COMPLEX 45 MIN: CPT

## 2020-11-27 PROCEDURE — 6370000000 HC RX 637 (ALT 250 FOR IP): Performed by: INTERNAL MEDICINE

## 2020-11-27 PROCEDURE — 6360000004 HC RX CONTRAST MEDICATION: Performed by: INTERNAL MEDICINE

## 2020-11-27 PROCEDURE — 6360000002 HC RX W HCPCS: Performed by: INTERNAL MEDICINE

## 2020-11-27 RX ADMIN — LACTOBACILLUS TAB 1 TABLET: TAB at 20:32

## 2020-11-27 RX ADMIN — LACTOBACILLUS TAB 1 TABLET: TAB at 08:00

## 2020-11-27 RX ADMIN — ACETAMINOPHEN 650 MG: 325 TABLET ORAL at 12:43

## 2020-11-27 RX ADMIN — BARIUM SULFATE 450 ML: 20 SUSPENSION ORAL at 09:05

## 2020-11-27 RX ADMIN — MIDODRINE HYDROCHLORIDE 2.5 MG: 2.5 TABLET ORAL at 07:30

## 2020-11-27 RX ADMIN — MELOXICAM 7.5 MG: 7.5 TABLET ORAL at 20:32

## 2020-11-27 RX ADMIN — LEVOFLOXACIN 500 MG: 5 INJECTION, SOLUTION INTRAVENOUS at 20:33

## 2020-11-27 RX ADMIN — IOPAMIDOL 100 ML: 755 INJECTION, SOLUTION INTRAVENOUS at 11:06

## 2020-11-27 RX ADMIN — MELOXICAM 7.5 MG: 7.5 TABLET ORAL at 08:00

## 2020-11-27 RX ADMIN — ACETAMINOPHEN 650 MG: 325 TABLET ORAL at 06:29

## 2020-11-27 RX ADMIN — Medication 10 ML: at 20:33

## 2020-11-27 RX ADMIN — ONDANSETRON 4 MG: 4 TABLET, ORALLY DISINTEGRATING ORAL at 21:54

## 2020-11-27 RX ADMIN — MIDODRINE HYDROCHLORIDE 2.5 MG: 2.5 TABLET ORAL at 16:47

## 2020-11-27 ASSESSMENT — PAIN DESCRIPTION - ONSET: ONSET: ON-GOING

## 2020-11-27 ASSESSMENT — PAIN SCALES - GENERAL
PAINLEVEL_OUTOF10: 8
PAINLEVEL_OUTOF10: 9
PAINLEVEL_OUTOF10: 8
PAINLEVEL_OUTOF10: 9

## 2020-11-27 ASSESSMENT — PAIN DESCRIPTION - PAIN TYPE
TYPE: ACUTE PAIN
TYPE: CHRONIC PAIN
TYPE: CHRONIC PAIN
TYPE: ACUTE PAIN;CHRONIC PAIN

## 2020-11-27 ASSESSMENT — PAIN DESCRIPTION - ORIENTATION
ORIENTATION: RIGHT;LOWER
ORIENTATION: RIGHT
ORIENTATION: LOWER
ORIENTATION: RIGHT

## 2020-11-27 ASSESSMENT — PAIN DESCRIPTION - LOCATION
LOCATION: BACK
LOCATION: LEG
LOCATION: LEG;GROIN
LOCATION: BACK
LOCATION: HIP;LEG

## 2020-11-27 ASSESSMENT — PAIN DESCRIPTION - FREQUENCY
FREQUENCY: CONTINUOUS

## 2020-11-27 ASSESSMENT — PAIN - FUNCTIONAL ASSESSMENT: PAIN_FUNCTIONAL_ASSESSMENT: PREVENTS OR INTERFERES SOME ACTIVE ACTIVITIES AND ADLS

## 2020-11-27 ASSESSMENT — PAIN DESCRIPTION - PROGRESSION
CLINICAL_PROGRESSION: NOT CHANGED
CLINICAL_PROGRESSION: NOT CHANGED

## 2020-11-27 ASSESSMENT — PAIN DESCRIPTION - DESCRIPTORS
DESCRIPTORS: SHARP
DESCRIPTORS: SHARP
DESCRIPTORS: SHOOTING
DESCRIPTORS: ACHING;SHARP

## 2020-11-27 NOTE — PROGRESS NOTES
Occupational Therapy   Occupational Therapy Initial Assessment  Date: 2020   Patient Name: Laureano Claudio  MRN: [id number]     : 1955    Date of Service: 2020    Discharge Recommendations:  Home with assist PRN, Home with Home health OT       Assessment   Performance deficits / Impairments: Decreased functional mobility ; Decreased ADL status; Decreased safe awareness;Decreased endurance;Decreased balance  Assessment: Pt is a 63y/o female PLOF lives home with family, was I/MI with ADL whom presents to [facility]* Dizziness, syncope and collapse, acute cystitis without hematuria, orthostatic hypotension. Pt demo's the above resulting perf def/impair and would benefit from OT skilled services during this hospitalization to maximize strength/end and safety/I with ADL for safe d/c transition. Prognosis: Good  Decision Making: Medium Complexity  History: multi comorb  Exam: 5 perf def/impair  OT Education: OT Role;Plan of Care;Transfer Training  REQUIRES OT FOLLOW UP: Yes  Safety Devices  Safety Devices in place: Yes  Type of devices: All fall risk precautions in place           Patient Diagnosis(es): The primary encounter diagnosis was Fall, initial encounter. Diagnoses of Dizziness, Syncope and collapse, Orthostatic hypotension, and Acute cystitis without hematuria were also pertinent to this visit. has a past medical history of Bowen's disease, Chronic back pain, Chronic kidney disease, COPD (chronic obstructive pulmonary disease) ([address] Irritable bowel syndrome with constipation, Lumbosacral spondylosis without myelopathy- Dr. Luci Conrad, Osteopenia, Sciatica, and Venous insufficiency. has a past surgical history that includes Hysterectomy (11); other surgical history; hernia repair; Kidney stone surgery; Foreign Body Removal (Right, 04/21/15); Colonoscopy; Cystocopy (N/A, ); skin biopsy; Tonsillectomy; Sigmoidoscopy (N/A, 10/19/2016); and pr colon ca scrn not hi rsk ind (N/A, 2017). Restrictions  Restrictions/Precautions  Restrictions/Precautions: Fall Risk  Required Braces or Orthoses?: No    Subjective   General  Chart Reviewed: Yes  Patient assessed for rehabilitation services?: Yes  Family / Caregiver Present: No  Diagnosis: Dizziness, syncope and collapse, acute cystitis without hematuria, orthostatic hypotension  Subjective  Subjective: Pt agreeable to OT eval/tx  Pain Assessment  Pain Level: 8  Social/Functional History  Social/Functional History  Lives With: Other (comment)(Ex-, dtr, and 13y/o granddaughter)  Type of Home: House  Home Layout: Two level, Bed/Bath upstairs(8 steps to bed & bath L HR ascending)  Home Access: Stairs to enter with rails  Entrance Stairs - Number of Steps: 3  Entrance Stairs - Rails: Both(able to reach both at the same time)  Bathroom Shower/Tub: Walk-in shower, Shower chair with back  [employer]: Handicap height  Bathroom Equipment: Grab bars in shower, Shower chair, Hand-held shower  Bathroom Accessibility: Walker accessible  Home Equipment: [employer] [place] nxtControl Help From: Family  ADL Assistance: Needs assistance  Bath: Minimal assistance(Dtr washes pt's hair)  Dressing: Minimal assistance(Dtr assists UB dressing since pt hurt her R shl)  Homemaking Responsibilities: No(Family does all homemaking tasks)  Ambulation Assistance: Independent(w/o AD most of the time, cane prn)  Transfer Assistance: Independent  Active : No  Occupation: On disability  Type of occupation: [employer], House cleaning,  centers  Leisure & Hobbies: TV       Objective        Orientation  Overall Orientation Status: Within Functional Limits  Observation/Palpation  Observation: 2 IV sites LUE, Telemetry monitor     ADL  Feeding: Independent  Grooming: Modified independent   UE Bathing: Minimal assistance(wash hair/back)  LE Bathing: Contact guard assistance  UE Dressing: Minimal assistance  LE Dressing: Contact guard assistance  Toileting: Contact guard assistance  Additional Comments: PLOF dtr assisted pt with UB dressing and bathing  Tone RUE  RUE Tone: Normotonic  Tone LUE  LUE Tone: Normotonic  Coordination  Movements Are Fluid And Coordinated: Yes  Coordination and Movement description: Gross motor impairments;Decreased speed;Right UE;Left UE     Bed mobility  Rolling to Left: Supervision  Supine to Sit: Supervision  Transfers  Stand Step Transfers: Contact guard assistance  Sit to stand: Contact guard assistance  Stand to sit: Contact guard assistance                       LUE AROM (degrees)  LUE AROM : WFL  Left Hand AROM (degrees)  Left Hand AROM: WFL  RUE AROM (degrees)  RUE AROM : Exceptions  RUE General AROM: Fx'd R humerus in Aug of 2020, limited shl ROM ~0-80* scaption  Right Hand AROM (degrees)  Right Hand AROM: WFL  LUE Strength  Gross LUE Strength: Exceptions to [facility]  L Shoulder Flex: 3+/5  RUE Strength  Gross RUE Strength: Exceptions to [facility]  R Shoulder Flex: 3/5     Hand Dominance  Hand Dominance: Right             Plan   Plan  Times per week: 3-6x/wk  Times per day: Daily  Plan weeks: <1- med pt  Current Treatment Recommendations: Strengthening, ROM, Balance Training, Functional Mobility Training, Endurance Training, Stair training, Neuromuscular Re-education, Pain Management, Safety Education & Training, Patient/Caregiver Education & Training, Self-Care / ADL, Home Management Training          Goals  Short term goals  Time Frame for Short term goals: 3-5 days- med pt  Short term goal 1: I BUE HEP  Short term goal 2: MI toileting  Short term goal 3: MI LB dressing and bathing  Short term goal 4: MI fxl ADL xfers  Long term goals  Time Frame for Long term goals : Same as STGs  Long term goal 1: Same as STGs  Long term goal 2: Same as STGs  Patient Goals   Patient goals :  To return home safely       Therapy Time   Individual Concurrent Group Co-treatment   Time In  10:45         Time Out  11:25         Minutes  Krishna Jimenez OT

## 2020-11-27 NOTE — H&P
Hospital Medicine  History and Physical    Patient:  Rosy Hankins  MRN: [id number]    CHIEF COMPLAINT:    Chief Complaint   Patient presents with   Ankit Loser Fall     Fall at home. Patient states that she landed on her buttocks from a standing position. Patient denies any LOC and pain. Patient c/o dizziness. History Obtained From:  Patient, EMR  Primary Care Physician: LINSEY Ribera - CNP    HISTORY OF PRESENT ILLNESS:   The patient is a 59 y.o. female with PMH of depression and anxiety. Patient is stated that her psychiatrist started her on Cymbalta 4 days ago and since then she has been having nausea, loss of appetite, generalized weakness. She presented to the emergency room and was found to have UTI and orthostatic hypotension. Patient denies any fever or pain however upon further questioning and examination it appears that she has discomfort in the right mid and lower abdomen. No rebound. No hematemesis or melena. No cough or congestion. No chest pain or palpitation. Past Medical History:      Diagnosis Date    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 Saulo Conway Osteopenia     Sciatica     Venous insufficiency        Past Surgical History:      Procedure Laterality Date    COLONOSCOPY      CYSTOSCOPY N/A 2014    x2    FOREIGN BODY REMOVAL Right 04/21/15    PARTIAL, GROIN    HERNIA REPAIR      double hernia oct.  2 2013    HYSTERECTOMY  7/14/11    bso Yvette Darby    KIDNEY STONE SURGERY      x2    OTHER SURGICAL HISTORY      laser surgery facundo dx    [place] COLON CA SCRN NOT HI RSK IND N/A 4/19/2017    COLONOSCOPY performed by Leonila Caceres MD at [address] N/A 10/19/2016    ANAL PROCTO SIGMOIDOSCOPY RIGID / excision perineal nodule performed by Rafael Aburto MD at [facility]         Medications Prior to Admission:    Prior to Admission medications    Medication Sig Start Date End Date Taking? Authorizing Provider   estradiol (ESTRACE) 0.1 MG/GM vaginal cream Place vaginally 4/29/20  Yes Historical Provider, MD   Meth-Hyo-M Bl-Na Phos-Ph Sal (URIBEL) 118 MG CAPS TAKE 1 CAPSULE BY MOUTH 4 TIMES DAILY AS NEEDED FOR BLADDER PAIN URGENCY 11/16/20  Yes LINSEY Wylie CNP   Probiotic Acidophilus [facility]) TABS Take 1 tablet by mouth 2 times daily 11/16/20 12/16/20 Yes LINSEY Conn CNP   psyllium (METAMUCIL) 0.52 g capsule Take 1 capsule by mouth daily 11/16/20  Yes LINSEY Wylie CNP   ondansetron (ZOFRAN ODT) 4 MG disintegrating tablet Take 1 tablet by mouth every 8 hours as needed for Nausea 11/14/20  Yes Toya Underwood MD   clobetasol (TEMOVATE) 0.05 % cream Apply topically 2 times daily. 10/8/20  Yes LINSEY Wylie CNP   meloxicam (MOBIC) 7.5 MG tablet TAKE 1 TABLET BY MOUTH TWICE DAILY 9/22/20  Yes Historical Provider, MD   lidocaine 4 % external patch Place 1 patch onto the skin daily   Yes Historical Provider, MD   albuterol sulfate  (90 Base) MCG/ACT inhaler Inhale 2 puffs into the lungs every 6 hours as needed for Wheezing 7/6/20  Yes LINSEY Conn CNP   albuterol sulfate HFA (PROAIR HFA) 108 (90 Base) MCG/ACT inhaler Inhale 2 puffs into the lungs every 6 hours as needed for Wheezing or Shortness of Breath 4/30/19  Yes LINSEY Wylie CNP   calcium-vitamin D (OSCAL 500/200 D-3) 500-200 MG-UNIT per tablet Take 1 tablet by mouth 2 times daily. Yes Historical Provider, MD   therapeutic multivitamin-minerals (THERAGRAN-M) tablet Take 1 tablet by mouth daily.      Yes Historical Provider, MD   DULoxetine (CYMBALTA) 30 MG extended release capsule Take 30 mg by mouth daily Indications: began taking 11/22/2020    Historical Provider, MD   ZTLIDO 1.8 % PTCH APPLY UP TO 2 PATCHES OVER PAINFUL AREAS OF SPINE FOR 12 HOURS THEN REMOVE FOR 12 HOURS 10/1/20   Historical Provider, MD   mirabegron (MYRBETRIQ) 50 MG TB24 Lot: [id number] Exp: 4/2022 10/7/20   Edna Tello MD   midodrine (PROAMATINE) 2.5 MG tablet Take 1 tablet by mouth 2 times daily (with meals) 7/28/20   Jono Lockhart MD   naloxone 4 MG/0.1ML LIQD nasal spray 1 spray by Nasal route as needed for Opioid Reversal 7/23/20   Marilu Saldana MD   Masks (FACE MASK EARLOOP-STYLE) MISC 1 Device by Does not apply route once for 1 dose 4/3/20 11/12/20  LINSEY Jarrett - CNP       Allergies:  Macrobid [nitrofurantoin monohydrate macrocrystals]; Nitrofurantoin; Bactrim [sulfamethoxazole-trimethoprim]; and Pcn [penicillins]    Social History:   TOBACCO:   reports that she has been smoking cigarettes. She has a 10.00 pack-year smoking history. She has never used smokeless tobacco.  ETOH:   reports current alcohol use of about 5.0 standard drinks of alcohol per week. Family History:       Problem Relation Age of Onset    Heart Disease Mother     Other Mother         dementia    Arthritis Father        REVIEW OF SYSTEMS:  Ten systems reviewed and negative except for stated in HPI    Physical Exam:    Vitals: /64   Pulse 84   Temp 97.9 °F (36.6 °C) (Oral)   Resp 20   Ht 5' 5\" (1.651 m)   Wt 136 lb 8 oz (61.9 kg)   LMP  (LMP Unknown)   SpO2 96%   BMI 22.71 kg/m²   General appearance: alert, appears stated age and cooperative, no acute distress  Skin: Skin color, texture, turgor normal. No rashes or lesions  HEENT: Head: Normocephalic, no lesions, without obvious abnormality.   Neck: no adenopathy, no carotid bruit, no JVD, supple, symmetrical, trachea midline and thyroid not enlarged, symmetric, no tenderness/mass/nodules  Lungs: clear to auscultation bilaterally  Heart: regular rate and rhythm, S1, S2 normal, no murmur, click, rub or gallop  Abdomen: Mild to moderate tenderness in the right abdomen, no rebound, no weakness or numbness. No bowel or bladder retention or incontinence. *Chronic hip pain as per patient for few years. She sees Dr. Marcos Childers. *Anemia, no evidence of acute blood loss. Hemoglobin drop secondary to aggressive hydration. Patient would need anemia work-up in the outpatient setting including but not limited to GI, GYN evaluation as well as age-appropriate cancer screening. *Depression and anxiety. Plan:  I had accepted to admit the patient to the medical floor. Pending urine culture I would continue Levaquin at this time. Continue IV fluid for the next 24 hours. Check a cortisol level. If cortisol level is borderline low I would proceed with Cortrosyn stim test.    I requested the CT abdomen and pelvis as well as CT hip to take a look at the right side of the abdomen, lumbar, sacral and hip. Patient status is dynamic and evolutionary therefore the aforementioned assessment and plan may or may not be complete, final or conclusive. Additional work-up, investigation, therapeutic intervention and consultation will be determined based on the clinical progression and a follow-up test result. Some of the needed work-up, investigation and therapeutic intervention could be completed in the outpatient setting.         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 High Risk Readmission Screening Tool Score Noted.      Emergency Contact:

## 2020-11-27 NOTE — DISCHARGE INSTR - COC
Continuity of Care Form    Patient Name: Gurvinder Bean   :  1955  MRN:  [id number]    Admit date:  2020  Discharge date:  ***    Code Status Order: Full Code   Advance Directives:   Advance Care Flowsheet Documentation     Date/Time Healthcare Directive Type of Healthcare Directive Copy in [address] Agent's Name Healthcare Agent's Phone Number    20  No, patient does not have an advance directive for healthcare treatment -- -- -- -- --          Admitting Physician:  Wesley Calle MD  PCP: Edmond Faust APRN - CNP    Discharging Nurse: [facility] Unit/Room#: 0210/0210-01  Discharging Unit Phone Number: ***    Emergency Contact:   Extended Emergency Contact Information  Primary Emergency Contact: Azul Roman, Sylvia Del Toro Phone: [phone]  Relation: Child  Secondary Emergency Contact: [address] [place] [address] Phone: [phone]  Relation: Parent    Past Surgical History:  Past Surgical History:   Procedure Laterality Date    COLONOSCOPY      CYSTOSCOPY N/A 2014    x2    FOREIGN BODY REMOVAL Right 04/21/15    PARTIAL, GROIN    HERNIA REPAIR      double hernia oct. 2 2013    HYSTERECTOMY  11    bso Robert Webber    KIDNEY STONE SURGERY      x2    OTHER SURGICAL HISTORY      laser surgery facundo dx    [place] COLON CA SCRN NOT HI RSK IND N/A 2017    COLONOSCOPY performed by Lion Justice MD at [address] N/A 10/19/2016    ANAL PROCTO SIGMOIDOSCOPY RIGID / excision perineal nodule performed by Aida Poole MD at [address] [place] [address]         Immunization History: There is no immunization history on file for this patient.     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    Drug-induced constipation K59.03    Chronic pelvic pain in female R10.2, G89.29    Abdominal pain, chronic, generalized R10.84, G89.29    2-part disp fx of surgical neck of right humerus, init S42.221A    Orthostatic hypotension I95.1    UTI (urinary tract infection) N39.0    Pyelonephritis N12       Isolation/Infection:   Isolation          No Isolation        Patient Infection Status     None to display          Nurse Assessment:  Last Vital Signs: /64   Pulse 84   Temp 97.9 °F (36.6 °C) (Oral)   Resp 20   Ht 5' 5\" (1.651 m)   Wt 136 lb 8 oz (61.9 kg)   LMP  (LMP Unknown)   SpO2 96%   BMI 22.71 kg/m²     Last documented pain score (0-10 scale): Pain Level: 8  Last Weight:   Wt Readings from Last 1 Encounters:   20 136 lb 8 oz (61.9 kg)     Mental Status:  {IP PT MENTAL STATUS:42522}    IV Access:  { JEFF IV ACCESS:857471251}    Nursing Mobility/ADLs:  Walking   {CHP DME QLUS:542212396}  Transfer  {P DME ZOIA:177946021}  Bathing  {CHP DME LMOM:969051250}  Dressing  {CHP DME NCAM:457084980}  Toileting  {CHP DME LKIQ:118223869}  Feeding  {P DME ZPGO:517112566}  Med Admin  {P DME APCD:074291011}  Med Delivery   { JEFF MED Delivery:693113511}    Wound Care Documentation and Therapy:  Incision 10/19/16 Perineum (Active)   Number of days: 1500        Elimination:  Continence:   · Bowel: {YES / N}  · Bladder: {YES / UP:42718}  Urinary Catheter: {Urinary Catheter:371037518}   Colostomy/Ileostomy/Ileal Conduit: {YES / AE:98096}       Date of Last BM: ***    Intake/Output Summary (Last 24 hours) at 2020 1605  Last data filed at 2020 1606  Gross per 24 hour   Intake 1000 ml   Output --   Net 1000 ml     I/O last 3 completed shifts: In:  [IV Piggyback:]  Out: -     Safety Concerns:     [address] Concerns:014417694}    Impairments/Disabilities:      [address] Impairments/Disabilities:518397847}    Nutrition Therapy:  Current Nutrition Therapy:   [address] JEFF Diet List:604574574}    Routes of Feeding: {CHP DME Other Feedings:228275228}  Liquids: {Slp liquid thickness:86099}  Daily Fluid Restriction: {CHP DME Yes amt example:527389734}  Last Modified Barium Swallow with Video (Video Swallowing Test): {Done Not Done OQNU:273043873}    Treatments at the Time of Hospital Discharge:   Respiratory Treatments: ***  Oxygen Therapy:  {Therapy; copd oxygen:47642}  Ventilator:    { CC Vent AQTB:335796061}    Rehab Therapies: {THERAPEUTIC INTERVENTION:3131960452}  Weight Bearing Status/Restrictions: [address]  Weight Bearin}  Other Medical Equipment (for information only, NOT a DME order):  {EQUIPMENT:946686923}  Other Treatments: ***    Patient's personal belongings (please select all that are sent with patient):  {CHP DME Belongings:862131094}    RN SIGNATURE:  {Esignature:170185110}    CASE MANAGEMENT/SOCIAL WORK SECTION    Inpatient Status Date: 20    Readmission Risk Assessment Score:  Readmission Risk              Risk of Unplanned Readmission:        13           Discharging to Facility/ Agency   · Name: [facility]   · Address: [address] [place] [address]  · Phone: [phone]  · Fax: [phone]    / signature: Electronically signed by MARION Angelo on 2020 at 4:05 PM      PHYSICIAN SECTION    Prognosis: {Prognosis:9513120773}    Condition at Discharge: [address] Patient Condition:922305397}    Rehab Potential (if transferring to Rehab): {Prognosis:0918391760}    Recommended Labs or Other Treatments After Discharge: ***    Physician Certification: I certify the above information and transfer of Ana Freeman  is necessary for the continuing treatment of the diagnosis listed and that she requires {Admit to Appropriate Level of Care:75299} for {GREATER/LESS:616911590} 30 days.      Update Admission H&P: {CHP DME Changes in CYVKW:077824995}    PHYSICIAN SIGNATURE:  {Esignature:434203073}

## 2020-11-27 NOTE — PROGRESS NOTES
Rosalia Boggs Initial Social Service Discharge Assessment    Met with Patient to discuss discharge plan. PCP: Jose Carpenter, APRN - CNP                                  Date of Last Visit: \"I talked to her over the phone 3 weeks ago\"    If no PCP, list provided? N/A    Discharge Planning    Living Arrangements: Patient reports currently residing with ex-     Who do you live with? \"My ex-, my daughter, me and my grand-daughter\" Patient at home \"my daughter gets uppity with me. Her mouth. I have chronic pain and they don't understand. \"  Patient reports \"they didn't want to take me up here to the hospital.  They think I just want drugs\"  Patient reports that Dr. Jennie Rebolledo is current pain management doctor and reports having an appointment on Mon. 11/30. Who helps you with your care:  Patient reports \"my daughter\" aid with \"just my shirt because I have a broken collar bone. \"  Patient goes on to report \" I was putting on my own makeup and get around\"    If lives at home:     Do you have any barriers navigating in your home? yes - Patient reports that stairs can be difficult to manage at time     Patient can perform ADL? Yes- daughter is reported to aid with putting on and taking off shirt. Patient goes on to report that daughter aids with the cooking and grocery shopping     Current Services (outpatient and in home) :  None    Dialysis: No    Is transportation available to get to your appointments? Yes- ex- is reported to assist with transportation     DME Equipment:  yes - cane and shower chair and grab bar    Respiratory equipment: None    Respiratory provider:  no     Pharmacy:  [address] [place] [address] Road in [facility] to afford cost of medication: Yes    Patient agreeable to [facility]? Yes, [address]     Patient agreeable to SNF/Rehab? N/A    Other discharge needs identified?       Patient reports history of depression and reports that she has an appointment with The Electronically signed by MARION Daniel on 11/27/2020 at 3:39 PM

## 2020-11-27 NOTE — PROGRESS NOTES
Reviewed: Yes  Patient assessed for rehabilitation services?: Yes  Additional Pertinent Hx: Pt admitted to Med Surg duet o Dizziness, syncopy, collapse, UTI and orthostatic hypotension  Family / Caregiver Present: No  Referring Practitioner: Dr Arnol Caban  Referral Date : 11/27/20  Diagnosis: Dizziness, syncopy, collaspe, UTI and orthostatic hypotension  Subjective  Subjective: Pt reports right leg \"sciatic\" pain 9/10  Pain Screening  Patient Currently in Pain: Yes  Pain Assessment  Pain Assessment: 0-10  Pain Level: 9  Pain Type: Acute pain  Pain Location: Leg  Pain Orientation: Right  Pain Descriptors: Shooting  Vital Signs  Patient Currently in Pain: Yes       Orientation     Social/Functional History  Social/Functional History  Lives With: Other (comment)(Ex-, dtr, and 15y/o granddaughter)  Type of Home: House  Home Layout: Two level, Bed/Bath upstairs(8 steps to bed & bath L HR ascending)  Home Access: Stairs to enter with rails  Entrance Stairs - Number of Steps: 3  Entrance Stairs - Rails: Both(able to reach both at the same time)  Bathroom Shower/Tub: Walk-in shower, Shower chair with back  [employer]: Handicap height  Bathroom Equipment: Grab bars in shower, Shower chair, Hand-held shower  Bathroom Accessibility: Walker accessible  Home Equipment: [address] Help From: Family  ADL Assistance: Needs assistance  Bath: Minimal assistance(Dtr washes pt's hair)  Dressing: Minimal assistance(Dtr assists UB dressing since pt hurt her R shl)  Homemaking Responsibilities: No(Family does all homemaking tasks)  Ambulation Assistance: Independent(w/o AD most of the time, cane prn)  Transfer Assistance: Independent  Active : No  Occupation: On disability  Type of occupation: [employer], House cleaning,  centers  Leisure & Hobbies: TV  Cognition        Objective          AROM RLE (degrees)  RLE AROM: WNL  AROM LLE (degrees)  LLE AROM : WNL  AROM RUE (degrees)  RUE General AROM: Limited right shoulder ROM due to humerus fx in Aug 2020  AROM LUE (degrees)  LUE AROM : WNL  Strength RLE  Strength RLE: WFL  Strength LLE  Strength LLE: WFL  Strength RUE  Strength RUE: WFL  Strength LUE  Strength LUE: WFL           Transfers  Sit to Stand: Stand by assistance  Stand to sit: Contact guard assistance  Bed to Chair: Contact guard assistance  Ambulation  Ambulation?: Yes  Ambulation 1  Surface: level tile  Device: No Device  Assistance: Contact guard assistance  Quality of Gait: Pt ambulated a few steps from the bed to the recliner with CGA  Gait Deviations: Slow ;Decreased step length  Distance: 5 steps  Comments: Pt had no reports of dizziness with sitting bedside, standing or walking              Plan   Plan  Times per week: 5-7  Times per day: Daily  Plan weeks: 1-3 MEd surg and home with HHC and PRN assitance  Current Treatment Recommendations: Strengthening, Transfer Training, Endurance Training, ROM, Balance Training, Gait Training, Home Exercise Program, Functional Mobility Training, Stair training    G-Code       OutComes Score                                                  AM-PAC Score             Goals  Short term goals  Time Frame for Short term goals: 1-3 days  Short term goal 1: Indep with bed mobility  Short term goal 2: Indep wth transfers  Short term goal 3: Pt able to ambulate with or without an AD for >125'x 1 with supervision  Short term goal 4: Pt able to tolerate 10 reps of ther ex  Short term goal 5: Pt able to ambulate up/down 5 steps with supervision with HR  Long term goals  Time Frame for Long term goals : Same as STGs  Patient Goals   Patient goals :  To return home safely       Therapy Time   Individual Concurrent Group Co-treatment   Time In  11:15am         Time Out  12:00pm         Minutes  45 min                  CARRIE Guaman#[id number]

## 2020-11-28 LAB
ANION GAP SERPL CALCULATED.3IONS-SCNC: 9 MEQ/L (ref 9–15)
BASOPHILS ABSOLUTE: 0 K/UL (ref 0–0.2)
BASOPHILS RELATIVE PERCENT: 0.7 %
BUN BLDV-MCNC: 8 MG/DL (ref 8–23)
CALCIUM SERPL-MCNC: 9.3 MG/DL (ref 8.5–9.9)
CHLORIDE BLD-SCNC: 107 MEQ/L (ref 95–107)
CO2: 25 MEQ/L (ref 20–31)
CREAT SERPL-MCNC: 0.4 MG/DL (ref 0.5–0.9)
EOSINOPHILS ABSOLUTE: 0.1 K/UL (ref 0–0.7)
EOSINOPHILS RELATIVE PERCENT: 1.4 %
GFR AFRICAN AMERICAN: >60
GFR NON-AFRICAN AMERICAN: >60
GLUCOSE BLD-MCNC: 116 MG/DL (ref 70–99)
HCT VFR BLD CALC: 33.1 % (ref 37–47)
HEMOGLOBIN: 11.2 G/DL (ref 12–16)
LYMPHOCYTES ABSOLUTE: 1.5 K/UL (ref 1–4.8)
LYMPHOCYTES RELATIVE PERCENT: 30.8 %
MAGNESIUM: 2.1 MG/DL (ref 1.7–2.4)
MCH RBC QN AUTO: 29.2 PG (ref 27–31.3)
MCHC RBC AUTO-ENTMCNC: 33.7 % (ref 33–37)
MCV RBC AUTO: 86.5 FL (ref 82–100)
MONOCYTES ABSOLUTE: 0.6 K/UL (ref 0.2–0.8)
MONOCYTES RELATIVE PERCENT: 11.5 %
NEUTROPHILS ABSOLUTE: 2.8 K/UL (ref 1.4–6.5)
NEUTROPHILS RELATIVE PERCENT: 55.6 %
PDW BLD-RTO: 14.4 % (ref 11.5–14.5)
PHOSPHORUS: 3.1 MG/DL (ref 2.3–4.8)
PLATELET # BLD: 272 K/UL (ref 130–400)
POTASSIUM SERPL-SCNC: 3.3 MEQ/L (ref 3.4–4.9)
RBC # BLD: 3.82 M/UL (ref 4.2–5.4)
SODIUM BLD-SCNC: 141 MEQ/L (ref 135–144)
URINE CULTURE, ROUTINE: NORMAL
WBC # BLD: 5 K/UL (ref 4.8–10.8)

## 2020-11-28 PROCEDURE — 97110 THERAPEUTIC EXERCISES: CPT

## 2020-11-28 PROCEDURE — 83735 ASSAY OF MAGNESIUM: CPT

## 2020-11-28 PROCEDURE — 1210000000 HC MED SURG R&B

## 2020-11-28 PROCEDURE — 97116 GAIT TRAINING THERAPY: CPT

## 2020-11-28 PROCEDURE — 84100 ASSAY OF PHOSPHORUS: CPT

## 2020-11-28 PROCEDURE — 2580000003 HC RX 258: Performed by: INTERNAL MEDICINE

## 2020-11-28 PROCEDURE — 80048 BASIC METABOLIC PNL TOTAL CA: CPT

## 2020-11-28 PROCEDURE — 6360000002 HC RX W HCPCS: Performed by: INTERNAL MEDICINE

## 2020-11-28 PROCEDURE — 36415 COLL VENOUS BLD VENIPUNCTURE: CPT

## 2020-11-28 PROCEDURE — 6370000000 HC RX 637 (ALT 250 FOR IP): Performed by: INTERNAL MEDICINE

## 2020-11-28 PROCEDURE — 85025 COMPLETE CBC W/AUTO DIFF WBC: CPT

## 2020-11-28 RX ORDER — PHENAZOPYRIDINE HYDROCHLORIDE 100 MG/1
200 TABLET, FILM COATED ORAL 2 TIMES DAILY
Status: DISCONTINUED | OUTPATIENT
Start: 2020-11-28 | End: 2020-11-30 | Stop reason: HOSPADM

## 2020-11-28 RX ORDER — POTASSIUM BICARBONATE 25 MEQ/1
25 TABLET, EFFERVESCENT ORAL 2 TIMES DAILY
Status: COMPLETED | OUTPATIENT
Start: 2020-11-28 | End: 2020-11-28

## 2020-11-28 RX ORDER — SODIUM CHLORIDE 9 MG/ML
INJECTION, SOLUTION INTRAVENOUS CONTINUOUS
Status: DISCONTINUED | OUTPATIENT
Start: 2020-11-28 | End: 2020-11-29

## 2020-11-28 RX ORDER — ESTRADIOL 0.1 MG/G
2 CREAM VAGINAL NIGHTLY
Status: DISCONTINUED | OUTPATIENT
Start: 2020-11-28 | End: 2020-11-30 | Stop reason: HOSPADM

## 2020-11-28 RX ADMIN — ACETAMINOPHEN 650 MG: 325 TABLET ORAL at 00:35

## 2020-11-28 RX ADMIN — POLYETHYLENE GLYCOL 3350 17 G: 17 POWDER, FOR SOLUTION ORAL at 17:23

## 2020-11-28 RX ADMIN — POTASSIUM BICARBONATE 25 MEQ: 977.5 TABLET, EFFERVESCENT ORAL at 20:10

## 2020-11-28 RX ADMIN — ESTRADIOL 2 G: 0.1 CREAM VAGINAL at 20:51

## 2020-11-28 RX ADMIN — MELOXICAM 7.5 MG: 7.5 TABLET ORAL at 20:10

## 2020-11-28 RX ADMIN — ACETAMINOPHEN 650 MG: 325 TABLET ORAL at 16:00

## 2020-11-28 RX ADMIN — SODIUM CHLORIDE: 9 INJECTION, SOLUTION INTRAVENOUS at 09:45

## 2020-11-28 RX ADMIN — LACTOBACILLUS TAB 1 TABLET: TAB at 08:03

## 2020-11-28 RX ADMIN — Medication 10 ML: at 08:12

## 2020-11-28 RX ADMIN — ZOLPIDEM TARTRATE 5 MG: 5 TABLET ORAL at 00:34

## 2020-11-28 RX ADMIN — MELOXICAM 7.5 MG: 7.5 TABLET ORAL at 08:03

## 2020-11-28 RX ADMIN — PHENAZOPYRIDINE HYDROCHLORIDE 200 MG: 100 TABLET ORAL at 11:46

## 2020-11-28 RX ADMIN — MIDODRINE HYDROCHLORIDE 2.5 MG: 2.5 TABLET ORAL at 17:42

## 2020-11-28 RX ADMIN — LEVOFLOXACIN 500 MG: 5 INJECTION, SOLUTION INTRAVENOUS at 20:11

## 2020-11-28 RX ADMIN — LACTOBACILLUS TAB 1 TABLET: TAB at 20:10

## 2020-11-28 RX ADMIN — MIDODRINE HYDROCHLORIDE 2.5 MG: 2.5 TABLET ORAL at 08:03

## 2020-11-28 RX ADMIN — ACETAMINOPHEN 650 MG: 325 TABLET ORAL at 09:50

## 2020-11-28 RX ADMIN — ZOLPIDEM TARTRATE 5 MG: 5 TABLET ORAL at 21:02

## 2020-11-28 RX ADMIN — PHENAZOPYRIDINE HYDROCHLORIDE 200 MG: 100 TABLET ORAL at 20:10

## 2020-11-28 RX ADMIN — POTASSIUM BICARBONATE 25 MEQ: 977.5 TABLET, EFFERVESCENT ORAL at 09:45

## 2020-11-28 ASSESSMENT — PAIN DESCRIPTION - LOCATION
LOCATION: LEG
LOCATION: OTHER (COMMENT)

## 2020-11-28 ASSESSMENT — PAIN SCALES - GENERAL
PAINLEVEL_OUTOF10: 8
PAINLEVEL_OUTOF10: 9
PAINLEVEL_OUTOF10: 5
PAINLEVEL_OUTOF10: 9
PAINLEVEL_OUTOF10: 9
PAINLEVEL_OUTOF10: 10
PAINLEVEL_OUTOF10: 9

## 2020-11-28 ASSESSMENT — PAIN DESCRIPTION - PROGRESSION
CLINICAL_PROGRESSION: NOT CHANGED
CLINICAL_PROGRESSION: GRADUALLY WORSENING
CLINICAL_PROGRESSION: NOT CHANGED

## 2020-11-28 ASSESSMENT — PAIN DESCRIPTION - ONSET
ONSET: ON-GOING
ONSET: ON-GOING

## 2020-11-28 ASSESSMENT — PAIN DESCRIPTION - DESCRIPTORS
DESCRIPTORS: BURNING
DESCRIPTORS: ACHING

## 2020-11-28 ASSESSMENT — PAIN DESCRIPTION - PAIN TYPE
TYPE: CHRONIC PAIN
TYPE: ACUTE PAIN

## 2020-11-28 ASSESSMENT — PAIN DESCRIPTION - ORIENTATION: ORIENTATION: RIGHT

## 2020-11-28 ASSESSMENT — PAIN DESCRIPTION - FREQUENCY
FREQUENCY: INTERMITTENT
FREQUENCY: CONTINUOUS

## 2020-11-28 NOTE — PROGRESS NOTES
that she has a lot of pain and cramping going on but is agreeable to therapy. Pain Screening  Patient Currently in Pain: Yes  Pain Assessment  Pain Assessment: 0-10  Pain Level: 9  Vital Signs  Patient Currently in Pain: Yes  Oxygen Therapy  SpO2: 98 %  Pulse Oximeter Device Mode: Intermittent  Pulse Oximeter Device Location: Right;Finger       Orientation     Cognition      Objective      Transfers  Sit to Stand: Stand by assistance  Stand to sit: Contact guard assistance  Bed to Chair: Contact guard assistance  Ambulation  Ambulation?: Yes  Ambulation 1  Surface: level tile  Device: Rolling Walker  Assistance: Supervision;Stand by assistance  Gait Deviations: Decreased step height;Decreased step length  Distance: 25 feet to bathroom. then 30sec, 45sec, 60sec march at chair for safety  Comments: Pt had no reports of dizziness with sitting bedside, standing or walking     Balance  Posture: Fair  Sitting - Static: Fair;+  Sitting - Dynamic: Fair  Standing - Static: Fair  Standing - Dynamic: Fair      AROM RUE (degrees)  RUE General AROM: Limited right shoulder ROM due to humerus fx in Aug 2020                    G-Code     OutComes Score                                                     AM-PAC Score             Goals  Short term goals  Time Frame for Short term goals: 1-3 days  Short term goal 1: Indep with bed mobility  Short term goal 2: Indep wth transfers  Short term goal 3: Pt able to ambulate with or without an AD for >125'x 1 with supervision  Short term goal 4: Pt able to tolerate 10 reps of ther ex  Short term goal 5: Pt able to ambulate up/down 5 steps with supervision with HR  Long term goals  Time Frame for Long term goals : Same as STGs  Patient Goals   Patient goals :  To return home safely    Plan    Plan  Times per week: 5-7  Times per day: Daily  Plan weeks: 1-3 MEd surg and home with [address] and PRN assitance  Current Treatment Recommendations: Strengthening, Transfer Training, Endurance Training, ROM, Balance Training, Gait Training, Home Exercise Program, Functional Mobility Training, Stair training     Therapy Time   Individual Concurrent Group Co-treatment   Time In  1130         Time Out  1200         Minutes  [facility]  License and [address] Number: [id number]

## 2020-11-28 NOTE — FLOWSHEET NOTE
0813: Patient stated she really would like her estrogen cream but her family refuses to bring it up to her or clean clothes. Patient refused outfit change and did not want to wear a gown. Patient is a standby assist to restroom and tolerated well. Patient is escorted back to bed and is eating breakfast at this time with no other request at this time. Patient also refused lovenox injection. This RN educated patient on purpose of lovenox, patient stated she did not want it at this time and will consider taking it at a later date.  Electronically signed by Tunde Chacon RN on 11/28/2020 at 8:15 AM

## 2020-11-28 NOTE — PLAN OF CARE
Problem: Falls - Risk of:  Goal: Will remain free from falls  Description: Will remain free from falls  Outcome: Ongoing  Goal: Absence of physical injury  Description: Absence of physical injury  Outcome: Ongoing     Problem: Pain:  Description: Pain management should include both nonpharmacologic and pharmacologic interventions. Goal: Pain level will decrease  Description: Pain level will decrease  Outcome: Ongoing  Goal: Control of acute pain  Description: Control of acute pain  Outcome: Ongoing  Goal: Control of chronic pain  Description: Control of chronic pain  Outcome: Ongoing     Problem: Skin Integrity:  Goal: Will show no infection signs and symptoms  Description: Will show no infection signs and symptoms  Outcome: Ongoing  Goal: Absence of new skin breakdown  Description: Absence of new skin breakdown  Outcome: Ongoing     Problem: DAILY CARE  Goal: Daily care needs are met  Outcome: Ongoing     Problem: KNOWLEDGE DEFICIT  Goal: Patient/S.O. demonstrates understanding of disease process, treatment plan, medications, and discharge instructions.   Outcome: Ongoing     Problem: DISCHARGE BARRIERS  Goal: Patient's continuum of care needs are met  Outcome: Ongoing     Problem: Sensory:  Goal: General experience of comfort will improve  Description: General experience of comfort will improve  Outcome: Ongoing     Problem: Urinary Elimination:  Goal: Signs and symptoms of infection will decrease  Description: Signs and symptoms of infection will decrease  Outcome: Ongoing  Goal: Ability to reestablish a normal urinary elimination pattern will improve - after catheter removal  Description: Ability to reestablish a normal urinary elimination pattern will improve  Outcome: Ongoing  Goal: Complications related to the disease process, condition or treatment will be avoided or minimized  Description: Complications related to the disease process, condition or treatment will be avoided or minimized  Outcome: Ongoing
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
## 2020-11-28 NOTE — PROGRESS NOTES
Patient is feeling better. Improvement of the right mid and the lower abdomen pain and discomfort. Chronic low back pain. No chest pain. No cough or congestion. No fever or chills. No hematemesis or melena. General appearance: alert, appears stated age and cooperative, no acute distress  Skin: Skin color, texture, turgor normal. No rashes or lesions  HEENT: Head: Normocephalic, no lesions, without obvious abnormality. Neck: no adenopathy, no carotid bruit, no JVD, supple, symmetrical, trachea midline and thyroid not enlarged, symmetric, no tenderness/mass/nodules  Lungs: clear to auscultation bilaterally  Heart: regular rate and rhythm, S1, S2 normal, no murmur, click, rub or gallop  Abdomen:  Improvement and resolution of the right mid and the lower abdomen discomfort. Extremities: extremities normal, atraumatic, no cyanosis or edema  Neurologic: Mental status: Alert, oriented, patient has a flat affect. She appears to be emotionless. Low tone and volume of speech. Dry speech content. No active delusion. Somewhat poor historian and I had to drag information out of her. Somewhat nonspecific stating that she has had the back and groin pain for 3 years and she sees PCP and the pain management. No lower extremities motor deficit. No sensory deficit. Assessment and plan:     *Pyelonephritis is suspected. Patient has UTI with right sided abdominal discomfort that had improved with the intravenous antibiotic suspecting ( Not confirmed ) pyelonephritis. CT scan did not show any other acute intra-abdominal pathology to explain her right-sided abdominal discomfort.     *Orthostatic hypotension. I believe that the patient has a chronic hypotension. She is on midodrine. She may have autonomic dysfunction. She is not diabetic.     Cortisol level is 12 essentially making a possibility of adrenal insufficiency as a cause of orthostatic hypotension unlikely.     *Dehydration manifested a clinically as well as by an elevated the ketones in the urine.     *Chronic lower back pain for the last 3 years as per patient for which she sees pain management, unknown etiology. No new weakness or numbness. No bowel or bladder retention or incontinence. CT did not reveal any significant urgent lumbar pathology.     *Chronic hip pain as per patient for few years. She sees Dr. Priscilla Lowry. CT showed degenerative changes. No other significant or urgent hip pathology seen on CT.     *Anemia, no evidence of acute blood loss. Hemoglobin drop secondary to aggressive hydration. Patient would need anemia work-up in the outpatient setting including but not limited to GI, GYN evaluation as well as age-appropriate cancer screening.     *Depression and anxiety.     Plan:  Pending urine culture I would continue Levaquin at this time. Continue IV fluid for the next 24 hours. Continue to monitor patient for additional 24 hours. Pending urine culture. Potentially patient could be discharged tomorrow if stable. Patient status is dynamic and evolutionary therefore the aforementioned assessment and plan may or may not be complete, final or conclusive. Additional work-up, investigation, therapeutic intervention and consultation will be determined based on the clinical progression and a follow-up test result. Some of the needed work-up, investigation and therapeutic intervention could be completed in the outpatient setting.

## 2020-11-29 PROCEDURE — 1210000000 HC MED SURG R&B

## 2020-11-29 PROCEDURE — 6370000000 HC RX 637 (ALT 250 FOR IP): Performed by: INTERNAL MEDICINE

## 2020-11-29 PROCEDURE — 99222 1ST HOSP IP/OBS MODERATE 55: CPT | Performed by: ORTHOPAEDIC SURGERY

## 2020-11-29 PROCEDURE — 93010 ELECTROCARDIOGRAM REPORT: CPT | Performed by: INTERNAL MEDICINE

## 2020-11-29 PROCEDURE — 6360000002 HC RX W HCPCS: Performed by: INTERNAL MEDICINE

## 2020-11-29 PROCEDURE — 2580000003 HC RX 258: Performed by: INTERNAL MEDICINE

## 2020-11-29 RX ORDER — LEVOFLOXACIN 5 MG/ML
250 INJECTION, SOLUTION INTRAVENOUS EVERY 24 HOURS
Status: DISCONTINUED | OUTPATIENT
Start: 2020-11-29 | End: 2020-11-30 | Stop reason: HOSPADM

## 2020-11-29 RX ORDER — FLUCONAZOLE 100 MG/1
100 TABLET ORAL DAILY
Status: DISCONTINUED | OUTPATIENT
Start: 2020-11-29 | End: 2020-11-30 | Stop reason: HOSPADM

## 2020-11-29 RX ADMIN — POLYETHYLENE GLYCOL 3350 17 G: 17 POWDER, FOR SOLUTION ORAL at 06:58

## 2020-11-29 RX ADMIN — ACETAMINOPHEN 650 MG: 325 TABLET ORAL at 05:13

## 2020-11-29 RX ADMIN — PHENAZOPYRIDINE HYDROCHLORIDE 200 MG: 100 TABLET ORAL at 20:00

## 2020-11-29 RX ADMIN — MELOXICAM 7.5 MG: 7.5 TABLET ORAL at 20:00

## 2020-11-29 RX ADMIN — ESTRADIOL 2 G: 0.1 CREAM VAGINAL at 20:00

## 2020-11-29 RX ADMIN — PHENAZOPYRIDINE HYDROCHLORIDE 200 MG: 100 TABLET ORAL at 08:27

## 2020-11-29 RX ADMIN — ACETAMINOPHEN 650 MG: 325 TABLET ORAL at 12:07

## 2020-11-29 RX ADMIN — LACTOBACILLUS TAB 1 TABLET: TAB at 20:01

## 2020-11-29 RX ADMIN — LEVOFLOXACIN 250 MG: 5 INJECTION, SOLUTION INTRAVENOUS at 20:02

## 2020-11-29 RX ADMIN — ZOLPIDEM TARTRATE 5 MG: 5 TABLET ORAL at 20:38

## 2020-11-29 RX ADMIN — ACETAMINOPHEN 650 MG: 325 TABLET ORAL at 18:25

## 2020-11-29 RX ADMIN — Medication 10 ML: at 08:28

## 2020-11-29 RX ADMIN — MELOXICAM 7.5 MG: 7.5 TABLET ORAL at 08:27

## 2020-11-29 RX ADMIN — MIDODRINE HYDROCHLORIDE 2.5 MG: 2.5 TABLET ORAL at 08:28

## 2020-11-29 RX ADMIN — SODIUM CHLORIDE: 9 INJECTION, SOLUTION INTRAVENOUS at 04:54

## 2020-11-29 RX ADMIN — Medication 10 ML: at 20:01

## 2020-11-29 RX ADMIN — MIDODRINE HYDROCHLORIDE 2.5 MG: 2.5 TABLET ORAL at 17:14

## 2020-11-29 RX ADMIN — LACTOBACILLUS TAB 1 TABLET: TAB at 08:28

## 2020-11-29 RX ADMIN — FLUCONAZOLE 100 MG: 100 TABLET ORAL at 10:18

## 2020-11-29 ASSESSMENT — PAIN SCALES - GENERAL
PAINLEVEL_OUTOF10: 6
PAINLEVEL_OUTOF10: 6
PAINLEVEL_OUTOF10: 5
PAINLEVEL_OUTOF10: 10
PAINLEVEL_OUTOF10: 10
PAINLEVEL_OUTOF10: 8

## 2020-11-29 ASSESSMENT — PAIN DESCRIPTION - PROGRESSION
CLINICAL_PROGRESSION: NOT CHANGED

## 2020-11-29 ASSESSMENT — PAIN DESCRIPTION - LOCATION: LOCATION: BACK
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
## 2020-11-29 NOTE — PROGRESS NOTES
Duplicate PT and OT orders eval and tx received this date. Pt already evaluated end of last week. Orders cleared.     Mike Bhat, [address] [facility],[address]

## 2020-11-29 NOTE — PROGRESS NOTES
Patient continues to report having chronic lower back and right hip pain and discomfort. Increased intensity of the pain over the last 24 to 48 hours, may be from laying down as per patient. No new neurological deficit. Patient reports having vaginal itching and burning. Patient requested to resume her estrogen vaginal cream.    General appearance: alert, appears stated age and cooperative, no acute distress  Skin: Skin color, texture, turgor normal. No rashes or lesions  HEENT: Head: Normocephalic, no lesions, without obvious abnormality. Neck: no adenopathy, no carotid bruit, no JVD, supple, symmetrical, trachea midline and thyroid not enlarged, symmetric, no tenderness/mass/nodules  Lungs: clear to auscultation bilaterally  Heart: regular rate and rhythm, S1, S2 normal, no murmur, click, rub or gallop  Abdomen:  Improvement and resolution of the right mid and the lower abdomen discomfort. Extremities: extremities normal, atraumatic, no cyanosis or edema  Neurologic: Mental status: Alert, oriented, patient has a flat affect.  She appears to be emotionless.  Low tone and volume of speech.  Dry speech content.  No active delusion.  Somewhat poor historian and I had to drag information out of her.  Somewhat nonspecific stating that she has had the back and groin pain for 3 years and she sees PCP and the pain management. No lower extremities motor deficit. No sensory deficit.     Assessment and plan:      *Pyelonephritis is suspected. Patient has UTI with right sided abdominal discomfort that had improved with the intravenous antibiotic suspecting ( Not confirmed ) pyelonephritis. CT scan did not show any other acute intra-abdominal pathology to explain her right-sided abdominal discomfort.   Significant improvement of the abdominal pain.     *Orthostatic hypotension.  I believe that the patient has a chronic hypotension.  She is on midodrine.  She may have autonomic dysfunction.  She is not diabetic.   Cortisol level is 12 essentially making a possibility of adrenal insufficiency as a cause of orthostatic hypotension unlikely.     *Dehydration and volume loss, resolved.     *Chronic lower back pain for the last 3 years as per patient for which she sees pain management, unknown etiology.  No new weakness or numbness.  No bowel or bladder retention or incontinence. CT did not reveal any significant urgent lumbar pathology. Worsening pain over the last 24 to 48 hours.     *Chronic hip pain as per patient for few years. Veronica Willson sees Dr. Courtney Sánchez. CT showed degenerative changes. No other significant or urgent hip pathology seen on CT.     *Anemia, no evidence of acute blood loss.  Hemoglobin drop secondary to aggressive hydration.  Patient would need anemia work-up in the outpatient setting including but not limited to GI, GYN evaluation as well as age-appropriate cancer screening.     *Depression and anxiety. *Vaginitis. Chronic. Patient uses estrogen cream.    *Lack of social support. Patient stated that her family is getting sick of her and trying not to pick her up to come back home. Plan:   Continue IV antibiotic pending urine culture report. Given the persistent and worsening lower back pain and hip pain I would recommend orthopedic and pain management consultation. The patient may need to have additional work-up and/or investigation such as MRI imaging to be determined by both  specialists if needed. As of today there is no indication for an urgent MRI imaging but may need to be done prior to discharge. Case management and social work consultation to evaluate her social status, family support, functional status and to determine if she qualifies to be admitted to the skilled section for short course of PT/OT and also to build up her social network in preparation for discharge back to the community. Patient reports having vaginitis for couple of months if not longer.   She uses estrogen cream. She may have vaginal yeast infection. She is not sexually active. Trial of Diflucan. Patient will need GYN evaluation. She will need a comprehensive vaginal and pelvic examination. This preferably should be done by GYN specialist.  This could be arranged to be done in the outpatient setting. Once again patient has multiple complex medical issues involving multiple organs in the setting of depression and the flat affect. She would definitely need additional work-up, investigation and therapeutic intervention. Some of which could be done in the inpatient setting and the rest can be done post discharge. I will be off service starting this evening. The case will be handled by my partner.

## 2020-11-29 NOTE — CONSULTS
Patient: Rosy Hankins  Unit/Bed:0210/0210-01  YOB: 1955  MRN: [id number]  Acct: [de-identified]   Admitting Diagnosis: UTI (urinary tract infection) [N39.0]  Pyelonephritis [N12]  Admit Date:  11/26/2020  Hospital Day: 2      Chief Complaint:       History of Present Illness:    Patient admitted on 11/26/2020 when she had a fall at home. The patient was having persistent pain and discomfort with standing and ambulation. The patient had no other issues or shortness of breath or chest pain. The patient was brought in and admitted to the medical service with a diagnosis of dizziness syncope orthostatic hypotension and acute cystitis without hematuria. Orthopedic consultation was obtained as during her stay she developed back and hip pain with no intervening trauma. She denies any bowel or bladder symptoms. Patient denies any history of such. The patient has had work-up including x-rays of the hip and the lumbar spine previously. During this hospitalization the patient is also had a CT of the abdomen as well as a CT of the hip and pelvis. He has received epidural injections per her own report. She states last one was about a month ago.     Allergies   Allergen Reactions    Macrobid [Nitrofurantoin Monohydrate Macrocrystals] Swelling    Nitrofurantoin Swelling    Bactrim [Sulfamethoxazole-Trimethoprim]     Pcn [Penicillins] Swelling     Rash and swelling of tongue c hospitalization yrs ago; no resp distress       Current Facility-Administered Medications   Medication Dose Route Frequency Provider Last Rate Last Dose    levoFLOXacin (LEVAQUIN) 250 MG/50ML infusion 250 mg  250 mg Intravenous Q24H Cali Price MD        fluconazole (DIFLUCAN) tablet 100 mg  100 mg Oral Daily Cali Price MD   100 mg at 11/29/20 1018    estradiol (ESTRACE) vaginal cream 2 g  2 g Vaginal Nightly Cali Price MD   2 g at 11/28/20 2051    phenazopyridine (PYRIDIUM) tablet 200 mg  200 mg Oral BID Cali Price MD   200 mg at 11/29/20 0827    albuterol (PROVENTIL) nebulizer solution 2.5 mg  2.5 mg Nebulization Q4H PRN Diana Rosado MD        meloxicam (MOBIC) tablet 7.5 mg  7.5 mg Oral BID Diana Rosado MD   7.5 mg at 11/29/20 0827    lactobacillus acidophilus [facility]) 1 tablet  1 tablet Oral BID Diana Rosado MD   1 tablet at 11/29/20 0828    ondansetron (ZOFRAN-ODT) disintegrating tablet 4 mg  4 mg Oral Q8H PRN Cali Price MD   4 mg at 11/27/20 2154    mirabegron (MYRBETRIQ) extended release tablet 50 mg  50 mg Oral Daily Cali Price MD        midodrine (PROAMATINE) tablet 2.5 mg  2.5 mg Oral BID WC Cali Price MD   2.5 mg at 11/29/20 1400    sodium chloride flush 0.9 % injection 10 mL  10 mL Intravenous 2 times per day Diana Rosado MD   10 mL at 11/29/20 1608    sodium chloride flush 0.9 % injection 10 mL  10 mL Intravenous PRN Cali Price MD        acetaminophen (TYLENOL) tablet 650 mg  650 mg Oral Q6H PRN Cali Price MD   650 mg at 11/29/20 1207    Or    acetaminophen (TYLENOL) suppository 650 mg  650 mg Rectal Q6H PRN Cali Price MD        polyethylene glycol (GLYCOLAX) packet 17 g  17 g Oral Daily PRN Cali Price MD   17 g at 11/29/20 0658    enoxaparin (LOVENOX) injection 40 mg  40 mg Subcutaneous Daily Cali Price MD        zolpidem (AMBIEN) tablet 5 mg  5 mg Oral Nightly PRN Diana Rosado MD   5 mg at 11/28/20 2102       PMHx:  Past Medical History:   Diagnosis Date    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 Anika Butler Osteopenia     Sciatica     Venous insufficiency        PSHx:  Past Surgical History:   Procedure Laterality Date    COLONOSCOPY      CYSTOSCOPY N/A 2014    x2    FOREIGN BODY REMOVAL Right 04/21/15    PARTIAL, GROIN    HERNIA REPAIR double hernia oct. 2 2013    HYSTERECTOMY  7/14/11    bso Yina Lee    KIDNEY STONE SURGERY      x2    OTHER SURGICAL HISTORY      laser surgery facundo dx    NV COLON CA SCRN NOT HI RSK IND N/A 4/19/2017    COLONOSCOPY performed by Lynn Rasmussen MD at [address] N/A 10/19/2016    ANAL PROCTO SIGMOIDOSCOPY RIGID / excision perineal nodule performed by Makenna Michaels MD at [facility]         Social Hx:  Social History     Socioeconomic History    Marital status:      Spouse name: None    Number of children: None    Years of education: None    Highest education level: None   Occupational History    Occupation: cook   Social Needs    Financial resource strain: None    Food insecurity     Worry: None     Inability: None    Transportation needs     Medical: None     Non-medical: None   Tobacco Use    Smoking status: Current Every Day Smoker     Packs/day: 0.50     Years: 20.00     Pack years: 10.00     Types: Cigarettes    Smokeless tobacco: Never Used    Tobacco comment: relaxes me   Substance and Sexual Activity    Alcohol use:  Yes     Alcohol/week: 5.0 standard drinks     Types: 5 Cans of beer per week     Comment: social    Drug use: No    Sexual activity: Not Currently     Partners: Male   Lifestyle    Physical activity     Days per week: None     Minutes per session: None    Stress: None   Relationships    Social connections     Talks on phone: None     Gets together: None     Attends Restorationism service: None     Active member of club or organization: None     Attends meetings of clubs or organizations: None     Relationship status: None    Intimate partner violence     Fear of current or ex partner: None     Emotionally abused: None     Physically abused: None     Forced sexual activity: None   Other Topics Concern    None   Social History Narrative    ** Merged History Encounter **            Family Hx:  Family History Problem Relation Age of Onset    Heart Disease Mother     Other Mother         dementia    Arthritis Father        Review ofSystems:   Review of Systems  Review of systems is reviewed from the chart in the ED note as below:    ROS:   Pertinent positives and negatives are stated within HPI, all other systems reviewed and are negative.  --------------------------------------------- PAST HISTORY ---------------------------------------------  Past Medical History:  has a past medical history of Bowen's disease, Chronic back pain, Chronic kidney disease, COPD (chronic obstructive pulmonary disease) ([address] Irritable bowel syndrome with constipation, Lumbosacral spondylosis without myelopathy- Dr. Martha Arciniega, Osteopenia, Sciatica, and Venous insufficiency.     Past Surgical History:  has a past surgical history that includes Hysterectomy (7/14/11); other surgical history; hernia repair; Kidney stone surgery; Foreign Body Removal (Right, 04/21/15); Colonoscopy; Cystocopy (N/A, 2014); skin biopsy; Tonsillectomy; Sigmoidoscopy (N/A, 10/19/2016); and pr colon ca scrn not hi rsk ind (N/A, 4/19/2017).    Social History:  reports that she has been smoking cigarettes. She has a 10.00 pack-year smoking history. She has never used smokeless tobacco. She reports current alcohol use of about 5.0 standard drinks of alcohol per week. She reports that she does not use drugs.     Family History: family history includes Arthritis in her father; Heart Disease in her mother; Other in her mother.      Physical Examination:    BP (!) 154/73   Pulse 72   Temp 97.7 °F (36.5 °C) (Oral)   Resp 18   Ht 5' 5\" (1.651 m)   Wt 136 lb 8 oz (61.9 kg)   LMP  (LMP Unknown)   SpO2 100%   BMI 22.71 kg/m²    Physical Exam     Constitutional/General: Alert and oriented x3, well appearing, non toxic in NAD  Head: Normocephalic and atraumatic  Eyes: PERRL, EOMI  Neck: Supple, full ROM, non tender to palpation in the midline, no stridor, no crepitus, no meningeal signs  Pulmonary: Lungs clear to auscultation bilaterally, no wheezes, rales, or rhonchi. Not in respiratory distress  Cardiovascular:  Regular rate. Regular rhythm. No murmurs, gallops, or rubs. 2+ distal pulses  Chest: no chest wall tenderness  Exam reveals no midline tenderness of the thoracic spine there is tenderness of the lumbar paraspinal muscles and over L3 and L4 no foot drop no saddle paresthesias  Abdomen: Soft. Non tender. Non distended. +BS. No rebound, guarding, or rigidity. No pulsatile masses appreciated. Musculoskeletal: Patient's motor is intact to the lower extremities. He is includes hip flexion abduction abduction, knee flexion extension, dorsiflexion and plantar flexion of the ankle, EHL. Patient also has gross sensory intact to bilateral lower extremities. Patient does have a little pain in the low numbness running down the back of the thigh with sciatic stretch testing. She has negative femoral stretch test.  Reflexes are symmetrical at the patella and Achilles reflexes 1+ bilaterally. She has negative clonus noted and downgoing Babinski's. She does have some mild back pain and paraspinal pain but not a significant amount.   Neurologic: GCS 15, CN 2-12 grossly intact, no focal deficits, symmetric strength 5/5 in the upper and lower extremities bilaterally  Psych: Normal Affect    LABS:  CBC:   Lab Results   Component Value Date    WBC 5.0 11/28/2020    RBC 3.82 11/28/2020    HGB 11.2 11/28/2020    HCT 33.1 11/28/2020    MCV 86.5 11/28/2020    MCH 29.2 11/28/2020    MCHC 33.7 11/28/2020    RDW 14.4 11/28/2020     11/28/2020    MPV 8.3 10/13/2014     CBC with Differential:   Lab Results   Component Value Date    WBC 5.0 11/28/2020    RBC 3.82 11/28/2020    HGB 11.2 11/28/2020    HCT 33.1 11/28/2020     11/28/2020    MCV 86.5 11/28/2020    MCH 29.2 11/28/2020    MCHC 33.7 11/28/2020    RDW 14.4 11/28/2020    LYMPHOPCT 30.8 11/28/2020    MONOPCT 11.5 11/28/2020 BASOPCT 0.7 11/28/2020    MONOSABS 0.6 11/28/2020    LYMPHSABS 1.5 11/28/2020    EOSABS 0.1 11/28/2020    BASOSABS 0.0 11/28/2020     CMP:    Lab Results   Component Value Date     11/28/2020    K 3.3 11/28/2020    K 3.6 11/27/2020     11/28/2020    CO2 25 11/28/2020    BUN 8 11/28/2020    CREATININE 0.40 11/28/2020    GFRAA >60.0 11/28/2020    LABGLOM >60.0 11/28/2020    GLUCOSE 116 11/28/2020    PROT 7.7 11/26/2020    LABALBU 4.8 11/26/2020    CALCIUM 9.3 11/28/2020    BILITOT 0.4 11/26/2020    ALKPHOS 97 11/26/2020    AST 20 11/26/2020    ALT 19 11/26/2020     BMP:    Lab Results   Component Value Date     11/28/2020    K 3.3 11/28/2020    K 3.6 11/27/2020     11/28/2020    CO2 25 11/28/2020    BUN 8 11/28/2020    LABALBU 4.8 11/26/2020    CREATININE 0.40 11/28/2020    CALCIUM 9.3 11/28/2020    GFRAA >60.0 11/28/2020    LABGLOM >60.0 11/28/2020    GLUCOSE 116 11/28/2020     Magnesium:    Lab Results   Component Value Date    MG 2.1 11/28/2020     Troponin:    Lab Results   Component Value Date    TROPONINI <0.010 11/26/2020     No results for input(s): PROBNP in the last 72 hours. No results for input(s): INR in the last 72 hours. RADIOLOGY:    XRAY HIP:    Impression    1. Degenerative changes are seen in the spine most significantly at L5-S1.    2. No acute fracture or dislocation is seen in the pelvis or hips. XRAY LUMBAR SPINE:    Impression    1. Degenerative changes are seen in the spine most significantly at L5-S1.    2. No acute fracture or dislocation is seen in the pelvis or hips. CT HIP:    No acute osseous abnormality.         Mild degenerative changes of the right hip.                   All CT scans at this facility use dose modulation, iterative reconstruction, and/or weight based dosing when appropriate to reduce radiation dose to as low as reasonably achievable.         CT Abdomen:    No acute intra-abdominal process.         Stool is present throughout the colon. Correlation for constipation recommended.              All CT scans at this facility use dose modulation, iterative reconstruction, and/or weight based dosing when appropriate to reduce radiation dose to as low as reasonably achievable. Evaluation of the patient including the chart the numerous studies and examination of the note I spent over 80 minutes with the patient in combination with the necessary reviews. Assessment:    Active Hospital Problems    Diagnosis Date Noted    Pyelonephritis [N12] 11/27/2020    UTI (urinary tract infection) [N39.0] 11/26/2020     Lumbar spondylosis and degenarative arthritis  Sciatica mild right sided    Plan:    No indication for any further orthopaedic work up as NVI and no radiculopathy or myelopathy. Recommend pain management and work up. May benefit from antiinflammatories and outpatient pt. If not improved with such can get outpatient work up with MRI and spine specialist if indecated. Signing off for this admission.         Electronically signed by Iron Lange MD on 11/29/2020 at 5:02 PM

## 2020-11-30 ENCOUNTER — TELEPHONE (OUTPATIENT)
Dept: FAMILY MEDICINE CLINIC | Age: 65
End: 2020-11-30

## 2020-11-30 VITALS
TEMPERATURE: 97.1 F | OXYGEN SATURATION: 98 % | SYSTOLIC BLOOD PRESSURE: 115 MMHG | HEART RATE: 71 BPM | BODY MASS INDEX: 22.74 KG/M2 | RESPIRATION RATE: 18 BRPM | DIASTOLIC BLOOD PRESSURE: 66 MMHG | WEIGHT: 136.5 LBS | HEIGHT: 65 IN

## 2020-11-30 PROBLEM — M16.11 ARTHRITIS OF RIGHT HIP: Status: ACTIVE | Noted: 2020-11-30

## 2020-11-30 PROBLEM — M51.36 DEGENERATIVE DISC DISEASE, LUMBAR: Status: ACTIVE | Noted: 2020-11-30

## 2020-11-30 PROBLEM — M47.816 LUMBAR ARTHROPATHY: Status: ACTIVE | Noted: 2020-11-30

## 2020-11-30 LAB
ANION GAP SERPL CALCULATED.3IONS-SCNC: 11 MEQ/L (ref 9–15)
BASOPHILS ABSOLUTE: 0 K/UL (ref 0–0.2)
BASOPHILS RELATIVE PERCENT: 0.7 %
BUN BLDV-MCNC: 14 MG/DL (ref 8–23)
C-REACTIVE PROTEIN, HIGH SENSITIVITY: 0.5 MG/L (ref 0–5)
CALCIUM SERPL-MCNC: 9.6 MG/DL (ref 8.5–9.9)
CHLORIDE BLD-SCNC: 106 MEQ/L (ref 95–107)
CO2: 26 MEQ/L (ref 20–31)
CREAT SERPL-MCNC: 0.52 MG/DL (ref 0.5–0.9)
EOSINOPHILS ABSOLUTE: 0.1 K/UL (ref 0–0.7)
EOSINOPHILS RELATIVE PERCENT: 2.4 %
GFR AFRICAN AMERICAN: >60
GFR NON-AFRICAN AMERICAN: >60
GLUCOSE BLD-MCNC: 110 MG/DL (ref 70–99)
HCT VFR BLD CALC: 33.9 % (ref 37–47)
HEMOGLOBIN: 11.4 G/DL (ref 12–16)
LYMPHOCYTES ABSOLUTE: 1.7 K/UL (ref 1–4.8)
LYMPHOCYTES RELATIVE PERCENT: 35.7 %
MCH RBC QN AUTO: 29.4 PG (ref 27–31.3)
MCHC RBC AUTO-ENTMCNC: 33.5 % (ref 33–37)
MCV RBC AUTO: 87.7 FL (ref 82–100)
MONOCYTES ABSOLUTE: 0.5 K/UL (ref 0.2–0.8)
MONOCYTES RELATIVE PERCENT: 10.1 %
NEUTROPHILS ABSOLUTE: 2.5 K/UL (ref 1.4–6.5)
NEUTROPHILS RELATIVE PERCENT: 51.1 %
PDW BLD-RTO: 14.8 % (ref 11.5–14.5)
PLATELET # BLD: 275 K/UL (ref 130–400)
POTASSIUM SERPL-SCNC: 4.2 MEQ/L (ref 3.4–4.9)
RBC # BLD: 3.86 M/UL (ref 4.2–5.4)
SODIUM BLD-SCNC: 143 MEQ/L (ref 135–144)
WBC # BLD: 4.8 K/UL (ref 4.8–10.8)

## 2020-11-30 PROCEDURE — 86141 C-REACTIVE PROTEIN HS: CPT

## 2020-11-30 PROCEDURE — 97110 THERAPEUTIC EXERCISES: CPT

## 2020-11-30 PROCEDURE — 97116 GAIT TRAINING THERAPY: CPT

## 2020-11-30 PROCEDURE — 85025 COMPLETE CBC W/AUTO DIFF WBC: CPT

## 2020-11-30 PROCEDURE — 80048 BASIC METABOLIC PNL TOTAL CA: CPT

## 2020-11-30 PROCEDURE — 97530 THERAPEUTIC ACTIVITIES: CPT

## 2020-11-30 PROCEDURE — 6370000000 HC RX 637 (ALT 250 FOR IP): Performed by: INTERNAL MEDICINE

## 2020-11-30 PROCEDURE — 36415 COLL VENOUS BLD VENIPUNCTURE: CPT

## 2020-11-30 RX ORDER — MELOXICAM 7.5 MG/1
7.5 TABLET ORAL 2 TIMES DAILY
Qty: 56 TABLET | Refills: 0 | Status: SHIPPED | OUTPATIENT
Start: 2020-11-30 | End: 2021-01-20

## 2020-11-30 RX ORDER — SENNA PLUS 8.6 MG/1
1 TABLET ORAL 2 TIMES DAILY
Status: DISCONTINUED | OUTPATIENT
Start: 2020-11-30 | End: 2020-11-30 | Stop reason: HOSPADM

## 2020-11-30 RX ORDER — PREGABALIN 25 MG/1
50 CAPSULE ORAL 2 TIMES DAILY
Status: DISCONTINUED | OUTPATIENT
Start: 2020-11-30 | End: 2020-11-30 | Stop reason: HOSPADM

## 2020-11-30 RX ORDER — LIDOCAINE 4 G/G
3 PATCH TOPICAL DAILY
Status: DISCONTINUED | OUTPATIENT
Start: 2020-11-30 | End: 2020-11-30 | Stop reason: HOSPADM

## 2020-11-30 RX ORDER — MAGNESIUM CARB/ALUMINUM HYDROX 105-160MG
296 TABLET,CHEWABLE ORAL ONCE
Status: COMPLETED | OUTPATIENT
Start: 2020-11-30 | End: 2020-11-30

## 2020-11-30 RX ORDER — PREGABALIN 50 MG/1
50 CAPSULE ORAL 2 TIMES DAILY
Qty: 56 CAPSULE | Refills: 3 | Status: SHIPPED | OUTPATIENT
Start: 2020-11-30 | End: 2020-11-30

## 2020-11-30 RX ORDER — LEVOFLOXACIN 250 MG/1
250 TABLET ORAL DAILY
Qty: 5 TABLET | Refills: 0 | Status: SHIPPED | OUTPATIENT
Start: 2020-11-30 | End: 2020-12-05

## 2020-11-30 RX ORDER — PREGABALIN 50 MG/1
50 CAPSULE ORAL 2 TIMES DAILY
Qty: 56 CAPSULE | Refills: 0 | Status: SHIPPED | OUTPATIENT
Start: 2020-11-30 | End: 2021-01-20

## 2020-11-30 RX ORDER — FLUCONAZOLE 100 MG/1
100 TABLET ORAL DAILY
Qty: 2 TABLET | Refills: 0 | Status: SHIPPED | OUTPATIENT
Start: 2020-12-01 | End: 2020-12-01

## 2020-11-30 RX ORDER — SENNA PLUS 8.6 MG/1
1 TABLET ORAL 2 TIMES DAILY
Qty: 60 TABLET | Refills: 0 | Status: SHIPPED | OUTPATIENT
Start: 2020-11-30 | End: 2020-12-07

## 2020-11-30 RX ADMIN — FLUCONAZOLE 100 MG: 100 TABLET ORAL at 09:16

## 2020-11-30 RX ADMIN — PHENAZOPYRIDINE HYDROCHLORIDE 200 MG: 100 TABLET ORAL at 09:15

## 2020-11-30 RX ADMIN — LACTOBACILLUS TAB 1 TABLET: TAB at 09:16

## 2020-11-30 RX ADMIN — SENNOSIDES 8.6 MG: 8.6 TABLET, FILM COATED ORAL at 12:07

## 2020-11-30 RX ADMIN — POLYETHYLENE GLYCOL 3350 17 G: 17 POWDER, FOR SOLUTION ORAL at 09:15

## 2020-11-30 RX ADMIN — MELOXICAM 7.5 MG: 7.5 TABLET ORAL at 09:16

## 2020-11-30 RX ADMIN — MIDODRINE HYDROCHLORIDE 2.5 MG: 2.5 TABLET ORAL at 09:16

## 2020-11-30 RX ADMIN — Medication 296 ML: at 12:08

## 2020-11-30 ASSESSMENT — PAIN SCALES - WONG BAKER
WONGBAKER_NUMERICALRESPONSE: 2
WONGBAKER_NUMERICALRESPONSE: 2

## 2020-11-30 ASSESSMENT — ENCOUNTER SYMPTOMS
EYES NEGATIVE: 1
BACK PAIN: 1
RESPIRATORY NEGATIVE: 1
ALLERGIC/IMMUNOLOGIC NEGATIVE: 1
GASTROINTESTINAL NEGATIVE: 1

## 2020-11-30 ASSESSMENT — PAIN SCALES - GENERAL
PAINLEVEL_OUTOF10: 3
PAINLEVEL_OUTOF10: 9
PAINLEVEL_OUTOF10: 5

## 2020-11-30 ASSESSMENT — PAIN DESCRIPTION - LOCATION: LOCATION: HIP

## 2020-11-30 ASSESSMENT — PAIN DESCRIPTION - PROGRESSION: CLINICAL_PROGRESSION: NOT CHANGED

## 2020-11-30 ASSESSMENT — PAIN DESCRIPTION - PAIN TYPE: TYPE: CHRONIC PAIN

## 2020-11-30 NOTE — PROGRESS NOTES
Physical Therapy  Facility/Department: [facility] MED SURG UNIT  Daily Treatment Note  NAME: Mehreen Adams  : 1955  MRN: [id number]    Date of Service: 2020    Discharge Recommendations:  Home with assist PRN, Home with Home health PT        Assessment   Body structures, Functions, Activity limitations: Decreased functional mobility ; Decreased endurance;Decreased strength;Decreased balance; Increased pain  Assessment: Issued and performed HEP prior to DC. Pt. limited by 9/10 pain in RLE and back. Pt. however ambulated and performed steps with no inc in pain. Pt. reporting walking with cane at home but likes [employer] with high pain. Educated can self purchase ie  Brittanyobinna Morgan. Pt. requesting to talk with  prior to D/C informed  of patient's request.    Treatment Diagnosis: Dizziness, syncope, collapse, UTI and orthostatic hypertension  Prognosis: Good  REQUIRES PT FOLLOW UP: Yes     Patient Diagnosis(es): The primary encounter diagnosis was Fall, initial encounter. Diagnoses of Dizziness, Syncope and collapse, Orthostatic hypotension, Acute cystitis without hematuria, Arthritis of right hip, Lumbar arthropathy, Degenerative disc disease, lumbar, and Facet arthropathy, lumbar were also pertinent to this visit. has a past medical history of Bowen's disease, Chronic back pain, Chronic kidney disease, COPD (chronic obstructive pulmonary disease) ([address] Irritable bowel syndrome with constipation, Lumbosacral spondylosis without myelopathy- Dr. Ryan Ace, Osteopenia, Sciatica, and Venous insufficiency. has a past surgical history that includes Hysterectomy (11); other surgical history; hernia repair; Kidney stone surgery; Foreign Body Removal (Right, 04/21/15); Colonoscopy; Cystocopy (N/A, ); skin biopsy; Tonsillectomy; Sigmoidoscopy (N/A, 10/19/2016); and pr colon ca scrn not hi rsk ind (N/A, 2017).     Restrictions  Restrictions/Precautions  Restrictions/Precautions: Fall Plan  Times per week: 5-7  Times per day: Daily  Plan weeks: 1-3 MEd surg and home with Rosie Coyne and PRN assitance  Current Treatment Recommendations: Strengthening, Transfer Training, Endurance Training, ROM, Balance Training, Gait Training, Home Exercise Program, Functional Mobility Training, Stair training     Therapy Time   Individual Concurrent Group Co-treatment   Time In  335         Time Out  405         Minutes  [address] [place], [place]  License and [address] Number: [id number]

## 2020-11-30 NOTE — PROGRESS NOTES
Occupational Therapy  Facility/Department: [facility] MED SURG UNIT  Daily Treatment Note  NAME: Glenn Iqbal  : 1955  MRN: [id number]    Date of Service: 2020    Discharge Recommendations:  Home with assist PRN, Home with Home health OT       Assessment   Performance deficits / Impairments: Decreased functional mobility ; Decreased ADL status; Decreased safe awareness;Decreased endurance;Decreased balance  Assessment: Pt is going home today. Pt provided with BUE HEP with review. Pt with no further questions. Prognosis: Good  REQUIRES OT FOLLOW UP: Yes         Patient Diagnosis(es): The primary encounter diagnosis was Fall, initial encounter. Diagnoses of Dizziness, Syncope and collapse, Orthostatic hypotension, Acute cystitis without hematuria, Arthritis of right hip, Lumbar arthropathy, Degenerative disc disease, lumbar, and Facet arthropathy, lumbar were also pertinent to this visit. has a past medical history of Bowen's disease, Chronic back pain, Chronic kidney disease, COPD (chronic obstructive pulmonary disease) ([address] Irritable bowel syndrome with constipation, Lumbosacral spondylosis without myelopathy- Dr. Cristina Godoy, Osteopenia, Sciatica, and Venous insufficiency. has a past surgical history that includes Hysterectomy (11); other surgical history; hernia repair; Kidney stone surgery; Foreign Body Removal (Right, 04/21/15); Colonoscopy; Cystocopy (N/A, ); skin biopsy; Tonsillectomy; Sigmoidoscopy (N/A, 10/19/2016); and pr colon ca scrn not hi rsk ind (N/A, 2017).     Restrictions  Restrictions/Precautions  Restrictions/Precautions: Fall Risk  Required Braces or Orthoses?: No  Subjective   General  Chart Reviewed: Yes  Patient assessed for rehabilitation services?: Yes  Family / Caregiver Present: No  Diagnosis: Dizziness, syncope and collapse, acute cystitis without hematuria, orthostatic hypotension  Subjective  Subjective: Pt agreeable to OT, pt going home today  Pain Assessment  Pain Assessment: 0-10  Pain Level: 9  Pain Type: Chronic pain  Pain Location: Hip(sciatica per pt)  Vital Signs  Patient Currently in Pain: Yes      Objective       Functional Mobility  Functional - Mobility Device: Rolling Walker  Activity: To/From therapy gym  Assist Level: Stand by assistance(/Spv)     Transfers  Sit to stand: Supervision  Stand to sit: Supervision              Additional Activities Comment  Additional Activities: Pt provided with BUE HEP handout with review, pt demo'd and verbalized understanding                          Plan   Plan  Times per week: 3-6x/wk  Times per day: Daily  Plan weeks: <1- med pt  Current Treatment Recommendations: Strengthening, ROM, Balance Training, Functional Mobility Training, Endurance Training, Stair training, Neuromuscular Re-education, Pain Management, Safety Education & Training, Patient/Caregiver Education & Training, Self-Care / ADL, Home Management Training               Goals  Short term goals  Time Frame for Short term goals: 3-5 days- med pt  Short term goal 1: I BUE HEP  Short term goal 2: MI toileting  Short term goal 3: MI LB dressing and bathing  Short term goal 4: MI fxl ADL xfers  Long term goals  Time Frame for Long term goals : Same as STGs  Long term goal 1: Same as STGs  Long term goal 2: Same as STGs  Patient Goals   Patient goals :  To return home safely       Therapy Time   Individual Concurrent Group Co-treatment   Time In  3:35         Time Out  3:50         Minutes  [address] [place] [address]

## 2020-11-30 NOTE — PROGRESS NOTES
Chart reviewed. Patient's care discussed in daily quality rounds. Patient was admitted to [facility] with a diagnosis of UTI. Patient was evaluated by PT/OT and therapies recommending home with [facility] [place] of choice provided. Patient identified [facility] as agency of choice. Patient was also seen by ortho due to reported consistent back pain. Ortho met with patient and noted no indication for any further orthopedic work up and recommending follow up out-patient with spine surgery and  pain management. This  met with patient to discuss any further DC needs. Patient reports plan to return to ex-'s home and reported interest in assisted living facilities in [place] and [facility] [facility]. This  provided patient with contact information for both [facility] and [facility] in the areas desired. This  also provided patient education on Medicaid and provided patient with a Medicaid application. Patient reports that she believes to be over financial limit for Medicaid due to receiving \"disability and survivor's benefits\" each month. Patient reports plan to contact [facility] and [facility] to consider care home placement. In the mean time patient desire for [facility] to follow patient at ex-'s home where patient plans to return. This  contacted [facility] [address] and advised Emery Liu of above. Roberto Wilson reports that [facility] will follow patient upon DC. SS to continue to follow as needed while patient is at [facility]

## 2020-11-30 NOTE — CONSULTS
[facility] inpatient facility    SERVICE DATE:  11/30/2020   SERVICE TIME:  1:36 PM  Admission date/inpatient  REASON FORCONSULT: Management of patient severe generalized pain  REQUESTING PHYSICIAN:  Charity Morrell MD  PRIMARY CARE PHYSICIAN:Marivel Bright, APRN - CNP    Chief Complaint   Patient presents with   [place] at home. Patient states that she landed on her buttocks from a standing position. Patient denies any LOC and pain. Patient c/o dizziness. HISTORY OF PRESENTILLNESS:  Ms. Bhakti Tyler is a 59 y.o. female who presents for [facility] after came to ER suffering from fall, as patient came to the hospital she was also diagnosed by UTI. She complained a lot of generalized back pain, she stated that she has been dizzy since started on Cymbalta by the [facility] center that given for depression. Patient states that since starting Cymbalta has not caused a lot of nausea vomiting loss of appetite generalized weakness being dizzy probably ended with a fall also when she came she had UTI and has orthostatic hypotension. She described pain all over, she has history of chronic back pain she has seen as an outpatient. Patient has general history with anxiety and depression, she is under care of [facility] for depression. She is to see me for chronic pain, she has return back to the office I will start her back on low-dose Z T lidocaine however avoid narcotics due to concern about her overtaking her medication. During today's visit I had spoken to the daughter on the phone to discuss her care with approval from   the patient    I admit prescription reporting system reviewed, patient used to be on Lyrica 75 mg 3 times daily.       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 Bryant Montanez Osteopenia     Sciatica     Venous insufficiency      PAST SURGICAL HISTORY:    Past Surgical History:   Procedure Laterality Date    COLONOSCOPY      CYSTOSCOPY N/A 2014    x2    FOREIGN BODY REMOVAL Right 04/21/15    PARTIAL, GROIN    HERNIA REPAIR      double hernia oct. 2 2013    HYSTERECTOMY  7/14/11    bso Paulo Batty    KIDNEY STONE SURGERY      x2    OTHER SURGICAL HISTORY      laser surgery facundo dx    VA COLON CA SCRN NOT HI RSK IND N/A 4/19/2017    COLONOSCOPY performed by Yuri Myers MD at [address] N/A 10/19/2016    ANAL PROCTO SIGMOIDOSCOPY RIGID / excision perineal nodule performed by Amber Sanders MD at [address] [place] [address] HISTORY:    Family History   Problem Relation Age of Onset    Heart Disease Mother     Other Mother         dementia    Arthritis Father      SOCIALHISTORY:    Social History     Socioeconomic History    Marital status:      Spouse name: Not on file    Number of children: Not on file    Years of education: Not on file    Highest education level: Not on file   Occupational History    Occupation: cook   Social Needs    Financial resource strain: Not on file    Food insecurity     Worry: Not on file     Inability: Not on file   [employer] needs     Medical: Not on file     Non-medical: Not on file   Tobacco Use    Smoking status: Current Every Day Smoker     Packs/day: 0.50     Years: 20.00     Pack years: 10.00     Types: Cigarettes    Smokeless tobacco: Never Used    Tobacco comment: relaxes me   Substance and Sexual Activity    Alcohol use:  Yes     Alcohol/week: 5.0 standard drinks     Types: 5 Cans of beer per week     Comment: social    Drug use: No    Sexual activity: Not Currently     Partners: Male   Lifestyle    Physical activity     Days per week: Not on file     Minutes per session: Not on file    Stress: Not on file   Relationships    Social connections     Talks on phone: Not on file     Gets together: Not on file     Attends Faith service: Not on file     Active member of club or organization: Not on file     Attends meetings of clubs or organizations: Not on file     Relationship status: Not on file    Intimate partner violence     Fear of current or ex partner: Not on file     Emotionally abused: Not on file     Physically abused: Not on file     Forced sexual activity: Not on file   Other Topics Concern    Not on file   Social History Narrative    ** Merged History Encounter **          PSYCHOLOGICAL HISTORY: History of anxiety and depression  MEDICATIONS:  Medications Prior to Admission: estradiol (ESTRACE) 0.1 MG/GM vaginal cream, Place vaginally  Meth-Hyo-M Bl-Na Phos-Ph Sal (URIBEL) 118 MG CAPS, TAKE 1 CAPSULE BY MOUTH 4 TIMES DAILY AS NEEDED FOR BLADDER PAIN URGENCY  Probiotic Acidophilus (FLORANEX) TABS, Take 1 tablet by mouth 2 times daily  psyllium (METAMUCIL) 0.52 g capsule, Take 1 capsule by mouth daily  ondansetron (ZOFRAN ODT) 4 MG disintegrating tablet, Take 1 tablet by mouth every 8 hours as needed for Nausea  clobetasol (TEMOVATE) 0.05 % cream, Apply topically 2 times daily. meloxicam (MOBIC) 7.5 MG tablet, TAKE 1 TABLET BY MOUTH TWICE DAILY  lidocaine 4 % external patch, Place 1 patch onto the skin daily  albuterol sulfate  (90 Base) MCG/ACT inhaler, Inhale 2 puffs into the lungs every 6 hours as needed for Wheezing  albuterol sulfate HFA (PROAIR HFA) 108 (90 Base) MCG/ACT inhaler, Inhale 2 puffs into the lungs every 6 hours as needed for Wheezing or Shortness of Breath  calcium-vitamin D (OSCAL 500/200 D-3) 500-200 MG-UNIT per tablet, Take 1 tablet by mouth 2 times daily.   therapeutic multivitamin-minerals [facility]) tablet, Take 1 tablet by mouth daily. DULoxetine (CYMBALTA) 30 MG extended release capsule, Take 30 mg by mouth daily Indications: began taking 11/22/2020  ZTLIDO 1.8 % PTCH, APPLY UP TO 2 PATCHES OVER PAINFUL AREAS OF SPINE FOR 12 HOURS THEN REMOVE FOR 12 HOURS  mirabegron (MYRBETRIQ) 50 MG TB24, Lot: [id number] Exp: 4/2022  midodrine (PROAMATINE) 2.5 MG tablet, Take 1 tablet by mouth 2 times daily (with meals)  naloxone 4 MG/0.1ML LIQD nasal spray, 1 spray by Nasal route as needed for Opioid Reversal  Masks (FACE MASK EARLOOP-STYLE) MISC, 1 Device by Does not apply route once for 1 dose  [unfilled]    ALLERGIES:  Macrobid [nitrofurantoin monohydrate macrocrystals]; Nitrofurantoin; Bactrim [sulfamethoxazole-trimethoprim]; and Pcn [penicillins]    COMPLETE REVIEW OF SYSTEMS:  As noted in HPI, 12 point ROS reviewed and otherwise negative. Review of Systems   Constitutional: Positive for activity change, appetite change and fatigue. Negative for chills, diaphoresis, fever and unexpected weight change. HENT: Negative. Eyes: Negative. Respiratory: Negative. Cardiovascular: Negative. Gastrointestinal: Negative. Endocrine: Negative. Genitourinary: Positive for decreased urine volume, difficulty urinating, dysuria, frequency and urgency. Negative for dyspareunia, enuresis, flank pain, genital sores, menstrual problem, pelvic pain, vaginal bleeding, vaginal discharge and vaginal pain. Musculoskeletal: Positive for arthralgias, back pain, gait problem, joint swelling, myalgias, neck pain and neck stiffness. Skin: Negative. Allergic/Immunologic: Negative. Neurological: Positive for dizziness, weakness, light-headedness and numbness. Negative for tremors, seizures, syncope, facial asymmetry, speech difficulty and headaches. Hematological: Negative. Psychiatric/Behavioral: Positive for behavioral problems, decreased concentration, dysphoric mood and sleep disturbance.  Negative for agitation, confusion, hallucinations, self-injury and suicidal ideas. The patient is nervous/anxious. The patient is not hyperactive. OBJECTIVE  PHYSICAL EXAM:  /66   Pulse 71   Temp 97.1 °F (36.2 °C) (Oral)   Resp 18   Ht 5' 5\" (1.651 m)   Wt 136 lb 8 oz (61.9 kg)   LMP  (LMP Unknown)   SpO2 98%   BMI 22.71 kg/m²   Body mass index is 22.71 kg/m². CONSTITUTIONAL: Patient alert awake, generally at her baseline lethargic, moderately anxious  EYES:  vision intact  ENT:  normocepalic, without obvious abnormality, atraumatic  NECK:  supple, symmetrical, trachea midline, skin normal and no stridor  BACK: Moderate tenderness across the lower back, right hip region  LUNGS:  no increased work of breathing  CARDIOVASCULAR:  regular rate and rhythm  ABDOMEN: Soft nontender nondistended  MUSCULOSKELETAL: Moderate tenderness across the bilateral lumbar spine area sacroiliac joint area, moderate pain across the right hip  NEUROLOGIC: Mental's exam seem to be intact, mild anxiety, most recent seem to be intact, not neurological central deficit  SKIN:  no bruising or bleeding      DATA:   Diagnostic tests reviewed for today's visit:    All labs and imaging results reviewed. Lab Results   Component Value Date    WBC 4.8 11/30/2020    HGB 11.4 11/30/2020    HCT 33.9 11/30/2020    MCV 87.7 11/30/2020     11/30/2020     11/30/2020    K 4.2 11/30/2020    K 3.6 11/27/2020     11/30/2020    CO2 26 11/30/2020    BUN 14 11/30/2020    CREATININE 0.52 11/30/2020    CALCIUM 9.6 11/30/2020    PHOS 3.1 11/28/2020    ALKPHOS 97 11/26/2020    ALT 19 11/26/2020    AST 20 11/26/2020    BILITOT 0.4 11/26/2020    LABALBU 4.8 11/26/2020     No results for input(s): Marely Hand, CANSU, COCAIMETSCRU, OPIATESCREENURINE, OXYCODONEUR, DSCOMMENT in the last 72 hours. Invalid input(s): PHENCYCLIDINESCREENURINE. LABMETH. PROPOX   Ct Abdomen Pelvis Wo Contrast Additional Contrast? None    Result Date: 11/14/2020  EXAMINATION:  CT ABDOMEN PELVIS WO CONTRAST HISTORY:   rt  flank  pain  for  hydro Alan Hernadez TECHNIQUE: Non-IV contrast imaging of the abdomen and pelvis was performed using standard technique, scanning from just above the dome of the diaphragm to the symphysis pubis. Including coronal and sagittal reconstructions. Unenhanced imaging is limited for the evaluation of some intra-abdominal and pelvic pathology. Contrast: IV: None Oral:  None. All CT scans at this facility use dose modulation, iterative reconstruction, and/or weight based dosing when appropriate to reduce radiation dose to as low as reasonably achievable. COMPARISON: CT 11/26/2019 RESULT: Abdomen / Pelvis: Liver: Unremarkable. Biliary: Gallbladder unremarkable. Pancreas: Unremarkable. Spleen: No splenomegaly. Adrenals: No mass. Kidneys: Again, 5 mm calculus within a dilated right upper pole calyx, unchanged from prior. No new calculi. No hydronephrosis. No suspicious renal lesions. GI Tract: No bowel dilation. Lymph Nodes: No lymphadenopathy. Mesentery/peritoneum/retroperitoneum: No ascites or mass. Vasculature: Moderate arterial atherosclerotic disease without aneurysm. Pelvis: No mass or ascites. Bladder decompressed. Hysterectomy. Small fat-containing periumbilical hernia. Bones: No acute osseous findings. Degenerative changes. Osteopenia. Dextroscoliosis. Soft tissues: Unremarkable. Lower thorax: Unremarkable. No significant interval change from 11/26/2019. Small calculus within dilated upper pole calyx on the right, similar to prior. No hydronephrosis. ==========     Xr Lumbar Spine (2-3 Views)    Result Date: 11/20/2020  COMPARISON: May 27, 2015, lumbar spine; right hip May 24, 2017 HISTORY:    pain PATIENT NAME: Raul Fran: TECHNIQUE: XR LUMBAR SPINE (2-3 VIEWS), XR HIP 2-3 VW W PELVIS RIGHT FINDINGS: Lumbar spine: A slight levoscoliosis. The spine is osteopenic. The vertebral body heights are preserved.  Mild intervertebral hydronephrosis. Urinary bladder is well distended. The uterus is surgically absent Abdominal aorta is nonaneurysmal  end demonstrates mild atherosclerotic calcification . No retroperitoneal or abdominal/pelvic lymphadenopathy. No small bowel obstruction. No overt colonic mass or pericolonic inflammation. No findings of acute appendicitis. Stool is present throughout the colon. No free fluid or free air. No acute fracture or malalignment. Degenerative disc disease at L5-S1. Mild facet arthropathy at L4-5 and L5-S1. No high-grade spinal canal or neuroforaminal stenosis identified by CT. No acute intra-abdominal process. Stool is present throughout the colon. Correlation for constipation recommended. All CT scans at this facility use dose modulation, iterative reconstruction, and/or weight based dosing when appropriate to reduce radiation dose to as low as reasonably achievable. Ct Hip Right Wo Contrast    Result Date: 11/27/2020  EXAM: CT HIP RIGHT WO CONTRAST HISTORY: Rib hip pain. Right low back pain. TECHNIQUE: Multiple contiguous axial images were obtained without. Multiplanar reformats were obtained. COMPARISON: Hip radiographs from May 24, 2017 FINDINGS: No acute fracture or dislocation. Mild superior hip joint space narrowing. Tiny subcortical cyst formation of the anterior superior acetabulum. No hip joint effusion. Visualized myotendinous structures of the right hip coronal appear intact by CT no soft  tissue mass or fluid collection. No acute osseous abnormality. Mild degenerative changes of the right hip. All CT scans at this facility use dose modulation, iterative reconstruction, and/or weight based dosing when appropriate to reduce radiation dose to as low as reasonably achievable.     Xr Hip 2-3 Vw W Pelvis Right    Result Date: 11/20/2020  COMPARISON: May 27, 2015, lumbar spine; right hip May 24, 2017 HISTORY:    pain PATIENT NAME: Radha Severe: TECHNIQUE: XR LUMBAR SPINE (2-3 VIEWS), XR HIP 2-3 VW W PELVIS RIGHT FINDINGS: Lumbar spine: A slight levoscoliosis. The spine is osteopenic. The vertebral body heights are preserved. Mild intervertebral disc space is seen from L1 to L4. Moderate intervertebral disc space narrowing is seen at L5-S1. Facet arthropathy is seen at L4 and L5. There is slight anterolisthesis of L4-L5. The sacroiliac joints are maintained. Vascular calcifications are seen in the abdominal aorta. Right hip and pelvis: The pelvic ring is intact. Sacroiliac joints are maintained. The joint spaces of the hips are symmetrical and are preserved. No acute fracture or dislocation is visualized. Densities seen in the pelvis are thought to be phleboliths. 1. Degenerative changes are seen in the spine most significantly at L5-S1. 2. No acute fracture or dislocation is seen in the pelvis or hips. [place] [address] Problems    Diagnosis Date Noted    Abdominal pain, chronic, generalized [R10.84, G89.29] 04/18/2018     Priority: High    Arthritis of right hip [M16.11] 11/30/2020    Degenerative disc disease, lumbar [M51.36] 11/30/2020    Lumbar arthropathy [M47.816] 11/30/2020    Pyelonephritis [N12] 11/27/2020    UTI (urinary tract infection) [N39.0] 11/26/2020    Drug-induced constipation [K59.03] 01/19/2018    Lumbosacral spondylosis without myelopathy- Dr. Madhav Hartmann [N10.967] 10/23/2015    Right-sided low back pain with sciatica [M54.41] 05/18/2015    Groin pain [R10.30] 04/08/2015    Right hip pain [M25.551] 02/11/2014       54-year-old female who is known to me from pain management encounter in the office, she has history of chronic lumbar degenerative disc disease, lumbar spondylosis, right hip arthritis as evidenced on the CAT scan done on the back and the hip earlier today. She has a fall, possibly due to dehydration, UTI, orthostatic hypotension, she had no loss of conscious, possibly side effect of Cymbalta which has stopped.   Patient has no reported head injury. Today have long discussion with her daughter who has several concerns about the patient over taking what ever she can get into it when she meant the pain however the patient is not on opioid for that reason, she was on Lyrica 75 mg 3 times daily which has seemed to be continued until recently and ran out of the prescription, according to the discussion seemed that the record was prescribed for neuropathy and was helping the patient. I have evaluated the patient and swell as her condition, weight addressing his benefit I do believe Lyrica at low-dose 50 mg twice daily would be helping patient neuropathy and helping the patient to seek further and prescribed pain control. I do believe that the patient has a clear reason that she could be in pain due to her arthritic pathology on the spine and hip however introduction of low-dose Lyrica would be helpful at 50 g twice daily together with Tylenol and other low-dose muscle relaxant, to be given to the patient and handled through the daughter who stated that she would be giving her medication on 5 days. RECOMMENDATION:  SEE ORDERS    Discussed starting Lyrica 50-minute twice daily, prescription for Lyrica will be provided home    Prescription for lidocaine, either the generic or commercial 1 will be provided to the patient seem to be helping her. Discussion for the results of the CAT scan done, recommendation for hip injection/right hip injection and lumbar facet injection as an outpatient    Outpatient referral physical therapy    Communication with primary team, patient can be discharged once medically stable to follow-up as outpatient please note the discussion and recommendation above also communicated the plan of care with patient and reconciled medication for discharge planning.     Lyrica prescription sent to the pharmacy, explained to the patient respiratory to  prescribed medication    SIGNATURE: Malik Alberts MD PATIENT NAME: Belinda Sanchez Sue   DATE: November 30, 2020 MRN: [id number]   TIME: 1:36 PM PAGER/CONTACT #: [phone]

## 2020-11-30 NOTE — PROGRESS NOTES
Worsening pain over the last 24 to 48 hours.     *Chronic hip pain as per patient for few years.  She sees Dr. Jeffers Lasso showed degenerative changes.  No other significant or urgent hip pathology seen on CT.     *Anemia, no evidence of acute blood loss.  Hemoglobin drop secondary to aggressive hydration.  Patient would need anemia work-up in the outpatient setting including but not limited to GI, GYN evaluation as well as age-appropriate cancer screening.     *Depression and anxiety.     *Vaginitis. Chronic. Patient uses estrogen cream.     *Lack of social support. Patient stated that her family is getting sick of her and trying not to pick her up to come back home. *Constipation.     Plan:   Continue IV antibiotic pending urine culture report.     Given the persistent and worsening lower back pain and hip pain I would recommend orthopedic and pain management consultation. The patient may need to have additional work-up and/or investigation such as MRI imaging to be determined by both  specialists if needed. As of today there is no indication for an urgent MRI imaging but may need to be done prior to discharge. Patient was seen by orthopedic physician. He did not recommend any additional work-up, investigation or treatment but outpatient follow-up with the spine surgery. Pending evaluation by Dr. Corina Gerard who has known that the patient for a long time. Magnesium citrate for constipation as well as Senokot.     Case management and social work consultation to evaluate her social status, family support, functional status and to determine if she qualifies to be admitted to the skilled section for short course of PT/OT and also to build up her social network in preparation for discharge back to the community.     Patient reports having vaginitis for couple of months if not longer. She uses estrogen cream.  She may have vaginal yeast infection. She is not sexually active. Trial of Diflucan.   Patient will need GYN evaluation. She will need a comprehensive vaginal and pelvic examination. This preferably should be done by GYN specialist.  This could be arranged to be done in the outpatient setting.     Once again patient has multiple complex medical issues involving multiple organs in the setting of depression and the flat affect. She would definitely need additional work-up, investigation and therapeutic intervention. Some of which could be done in the inpatient setting and the rest can be done post discharge. Patient did not qualify to be admitted to the skilled section for physical and Occupational Therapy. Patient walked in the hallway.     Potentially patient could be discharged tomorrow if cleared by Dr. Madhav Hartmann and social work team.

## 2020-12-01 ENCOUNTER — CARE COORDINATION (OUTPATIENT)
Dept: CASE MANAGEMENT | Age: 65
End: 2020-12-01

## 2020-12-01 ENCOUNTER — TELEPHONE (OUTPATIENT)
Dept: FAMILY MEDICINE CLINIC | Age: 65
End: 2020-12-01

## 2020-12-01 PROCEDURE — 1111F DSCHRG MED/CURRENT MED MERGE: CPT | Performed by: NURSE PRACTITIONER

## 2020-12-01 NOTE — TELEPHONE ENCOUNTER
Virginia Liter to let her know that you would follow her and I asked her about you signing and she said the they can accept your signatures for orders now as of January they do not need a collaborator to do this. cp

## 2020-12-01 NOTE — DISCHARGE SUMMARY
Hospital Medicine Discharge Summary    Royal Wellington  :  1955  MRN:  [id number]    Admit date:  2020  Discharge date:  2020    Admitting Physician:  No admitting provider for patient encounter. Primary Care Physician:  LINSEY Guerin CNP      Discharge Diagnoses:      *Pyelonephritis, UTI: Continue antibiotic.     *Orthostatic hypotension.  I believe that the patient has a chronic hypotension.  She is on midodrine.  She may have autonomic dysfunction.  She is not diabetic.    Cortisol level is 12 essentially making a possibility of adrenal insufficiency as a cause of orthostatic hypotension unlikely. Stable at this time.     *Dehydration and volume loss, resolved.     *Chronic lower back pain for the last 3 years as per patient for which she sees pain management, unknown etiology.  No new weakness or numbness.  No bowel or bladder retention or incontinence.  CT did not reveal any significant urgent lumbar pathology.  Worsening pain over the last 24 to 48 hours.     *Chronic hip pain as per patient for few years.  She sees Dr. Criselda Mott showed degenerative changes.  No other significant or urgent hip pathology seen on CT.     *Anemia, no evidence of acute blood loss.  Hemoglobin drop secondary to aggressive hydration.  Patient would need anemia work-up in the outpatient setting including but not limited to GI, GYN evaluation as well as age-appropriate cancer screening.     *Depression and anxiety.     *Vaginitis.  Chronic.  Patient uses estrogen cream.     *Lack of social support.  Patient stated that her family is getting sick of her and trying not to pick her up to come back home.     *Constipation. * Other nedical conditions not listed here. Hospital Course:   Royal Wellington is a 59 y.o. female that was admitted and treated at [facility] with the frequent urination. She was found to have a UTI.   The patient reported having right mid the on the lower abdomen pain and discomfort clinically suspecting pyelonephritis. I ordered the CAT scan which did not show any obstruction but positive for constipation. Patient to improve care with antibiotic and the stool softeners. Patient reports having to chronic back and hip pain. I ordered CT head which showed no generator change. Lumbar spine noted being on CT abdomen showed also disc degeneration. I consulted orthopedic physician who did not recommend any additional inpatient work-up, investigation or treatment but pain management consultation. It appears that the patient has had this for a few years. She sees Dr. Margi Betancur for that. Her family called multiple times and informed the nursing staff that the patient is always asking for neck pain medications. Family apparently is fed up with her pain and request therefore pain medications. At some point family declined to bring her cloths to change and also declined to pick it up from the hospital.    Dr. Margi Betancur evaluated the patient. The please refer to his note for details. He communicated with me about the patient could be discharged from his understand that point and that he can see it in the office. Due to your questionable social issues as described above and the lack of family support that I engaged social work team to evaluate her home situation and provide me with the fact clarity as well as advice and recommendations. Please refer to social work note for details. Family accepted to take her back home with home health care. Patient reported having vaginal burning. She requested to resume her vaginal estrogen cream that she takes at home. Patient continues to have vaginal burning sensation. I started the patient on Diflucan 100 mg for 3 days suspecting that she may have a fungal infection. Patient reports having some improvement with that. Nonetheless patient would require comprehensive the pelvic and the GYN evaluation.   This will be arranged before discharge. Given the patient's chronic Back and hip pain as well as multiple other medical and social issues I requested the patient to follow-up with spine surgeon Dr. Anton West. Follow-up with the orthopedic surgeon. Follow-up with Dr. Corina Gerard. Follow-up with GYN. Follow-up with GI and is to get to the bottom of her multiple complaints that involve her multiple organs. Patient was cleared to be discharged by pain management and orthopedic team.  Patient they cleared her for discharge by . Patient has multiple medical issues. I do not have clear or strong clinical justification to extend inpatient hospitalization. Patient however would definitely need and require close and frequent monitoring as well as additional work-up, investigation and therapeutic intervention that potentially could take place in the outpatient setting by primary care doctor in collaboration with other specialists. That is complete work-up and investigation in the outpatient setting care, ensure appropriate recovery post discharge and to prevent relapse, decompensation, rehospitalization and other medical implications. Patient was seen by the following consultants while admitted to [facility]:   Consults:  [facility] ED TO CASE MANAGEMENT  IP CONSULT TO PAIN MANAGEMENT  IP CONSULT TO ORTHOPEDIC SURGERY  IP CONSULT TO CASE MANAGEMENT  IP CONSULT TO HOME CARE NEEDS    Significant Diagnostic Studies:    Ct Abdomen Pelvis W Iv Contrast Additional Contrast? Oral    Result Date: 11/27/2020  EXAM:  CT ABDOMEN PELVIS W IV CONTRAST History: Right-sided abdominal pain. Spine pain. Technique: Multiple contiguous axial images were obtained of the abdomen and pelvis from an level of the lung bases through the ischial tuberosities with contrast. Multiplanar reformats were obtained. Delayed images were obtained.  Comparison: CT abdomen pelvis from November 14, 2020 Findings: Lung bases are clear. The liver, gallbladder, spleen, stomach, pancreas, and adrenal glands are within normal limits. The kidneys enhance uniformly. A 1 cm right renal cyst is identified underlying an area of cortical thinning likely secondary to remote insult. This does not appear changed from prior examinations No urinary tract calculi or hydronephrosis. Urinary bladder is well distended. The uterus is surgically absent Abdominal aorta is nonaneurysmal  end demonstrates mild atherosclerotic calcification . No retroperitoneal or abdominal/pelvic lymphadenopathy. No small bowel obstruction. No overt colonic mass or pericolonic inflammation. No findings of acute appendicitis. Stool is present throughout the colon. No free fluid or free air. No acute fracture or malalignment. Degenerative disc disease at L5-S1. Mild facet arthropathy at L4-5 and L5-S1. No high-grade spinal canal or neuroforaminal stenosis identified by CT. No acute intra-abdominal process. Stool is present throughout the colon. Correlation for constipation recommended. All CT scans at this facility use dose modulation, iterative reconstruction, and/or weight based dosing when appropriate to reduce radiation dose to as low as reasonably achievable. Ct Hip Right Wo Contrast    Result Date: 11/27/2020  EXAM: CT HIP RIGHT WO CONTRAST HISTORY: Rib hip pain. Right low back pain. TECHNIQUE: Multiple contiguous axial images were obtained without. Multiplanar reformats were obtained. COMPARISON: Hip radiographs from May 24, 2017 FINDINGS: No acute fracture or dislocation. Mild superior hip joint space narrowing. Tiny subcortical cyst formation of the anterior superior acetabulum. No hip joint effusion. Visualized myotendinous structures of the right hip coronal appear intact by CT no soft  tissue mass or fluid collection. No acute osseous abnormality. Mild degenerative changes of the right hip.  All CT scans at this facility use dose modulation, iterative reconstruction, and/or weight based dosing when appropriate to reduce radiation dose to as low as reasonably achievable. Discharge Medications:       Tomi Stanford   Home Medication Instructions OVZ:[id number]    Printed on:12/01/20 1851   Medication Information                      albuterol sulfate HFA (PROAIR HFA) 108 (90 Base) MCG/ACT inhaler  Inhale 2 puffs into the lungs every 6 hours as needed for Wheezing or Shortness of Breath             calcium-vitamin D (OSCAL 500/200 D-3) 500-200 MG-UNIT per tablet  Take 1 tablet by mouth 2 times daily. clobetasol (TEMOVATE) 0.05 % cream  Apply topically 2 times daily. estradiol (ESTRACE) 0.1 MG/GM vaginal cream  Place vaginally             levoFLOXacin (LEVAQUIN) 250 MG tablet  Take 1 tablet by mouth daily for 5 days             lidocaine 4 % external patch  Place 1 patch onto the skin daily             Masks (FACE MASK EARLOOP-STYLE) MISC  1 Device by Does not apply route once for 1 dose             meloxicam (MOBIC) 7.5 MG tablet  Take 1 tablet by mouth 2 times daily for 28 days             Meth-Hyo-M Bl-Na Phos-Ph Sal (URIBEL) 118 MG CAPS  TAKE 1 CAPSULE BY MOUTH 4 TIMES DAILY AS NEEDED FOR BLADDER PAIN URGENCY             midodrine (PROAMATINE) 2.5 MG tablet  Take 1 tablet by mouth 2 times daily (with meals)             mirabegron (MYRBETRIQ) 50 MG TB24  Lot: [id number] Exp: 4/2022             pregabalin (LYRICA) 50 MG capsule  Take 1 capsule by mouth 2 times daily for 28 days. Probiotic Acidophilus (FLORANEX) TABS  Take 1 tablet by mouth 2 times daily             psyllium (METAMUCIL) 0.52 g capsule  Take 1 capsule by mouth daily             senna (SENOKOT) 8.6 MG tablet  Take 1 tablet by mouth 2 times daily             therapeutic multivitamin-minerals (THERAGRAN-M) tablet  Take 1 tablet by mouth daily.                ZTLIDO 1.8 % PTCH  APPLY UP TO 2 PATCHES OVER PAINFUL AREAS OF SPINE FOR 12 HOURS THEN REMOVE FOR 12 HOURS                 Disposition:   Discharged to [place] [address] at  Home. Any [facility] needs that were indicated and/or required as been addressed and set up by Social Work. Condition at discharge: Pt was medically stable at the time of discharge. Significant improvement in clinical condition compared to initial condition at presentation to hospital    Activity: activity as tolerated, fall precautions. Total time taken for discharging this patient: 40 minutes. Greater than 70% of time was spent focused exclusively on this patient. Time was taken to review chart, discuss plans with consultants, reconciling medications, discussing plan answering questions with patient. Melyssa Carolina  12/1/2020, 6:51 PM  ----------------------------------------------------------------------------------------------------------------------    Sloan Michelle,     Please return to ER or call 911 if you develop any significant signs or symptoms.     I may not have addressed all of your medical illnesses or the abnormal blood work or imaging therefore please ask your PCP, LINSEY Pepe CNP ,  to obtain [facility] record to follow up on all of the abnormal labs, imaging and findings that I have and have not addressed during your hospitalization.      Discharging you from the hospital does not mean that your medical care ends here and now. You may still need additional work up, investigation, monitoring, and treatment to be handled from this point on by outside providers including your PCP, LINSEY Abdul CNP , Specialists and other healthcare providers.      Please review your list of discharge medications prior to resuming medications you might still have at home, as the medications you need to be taking, dosages or how often you must take them may have changed.  For medication questions, contact your retail pharmacy and your PCP, LINSEY Pepe CNP .     ** I STRONGLY RECOMMEND that you follow up with Laura Walker

## 2020-12-01 NOTE — TELEPHONE ENCOUNTER
Erica from [facility] wanted to know if you would follow Mariely Joseph for home care. Please call Erica at [phone]. cp

## 2020-12-01 NOTE — CARE COORDINATION
[facility] Transitions Initial Follow Up Call    Call within 2 business days of discharge: Yes    Patient: Cristobal Munoz Patient : 1955   MRN: [id number]  Reason for Admission: - [place] Fall, Syncope & Collapse, UTI/Cystitis. Discharge Date: 20 RARS: Readmission Risk Score: 16  NR. [facility] ED visits. No CV-19 lab performed this admit. Hosp FU  11:20  Care Transitions      Challenges to be reviewed by the provider   Additional needs identified to be addressed with provider No  none    Discussed COVID-19 related testing which was not done at this time. Test results were not done. Patient informed of results, if available? NA         Method of communication with provider : none    Advance Care Planning:   Does patient have an Advance Directive:  reviewed and current. Was this a readmission? No  Patient stated reason for admission: UTI  Patients top risk factors for readmission: medical condition    Care Transition Nurse (CTN) contacted the patient by telephone to perform post hospital discharge assessment. Verified name and  with patient as identifiers. Provided introduction to self, and explanation of the CTN role. CTN reviewed discharge instructions, medical action plan and red flags with patient who verbalized understanding. Patient given an opportunity to ask questions and does not have any further questions or concerns at this time. Were discharge instructions available to patient? Yes. Reviewed appropriate site of care based on symptoms and resources available to patient including: Reviewed symptoms of wrosening UTI to report. Reviewed symptoms of CV-19 to report/go to ED. . The patient agrees to contact the PCP office for questions related to their healthcare. Medication reconciliation was performed with patient, who verbalizes understanding of administration of home medications. Advised obtaining a 90-day supply of all daily and as-needed medications.      Covid Risk Education    Patient has following risk factors of: Recurrent UTIs. .   Education provided regarding infection prevention, and signs and symptoms of COVID-19 and when to seek medical attention with patient who verbalized understanding. Discussed exposure protocols and quarantine From CDC: Are you at higher risk for severe illness?   and given an opportunity for questions and concerns. The patient agrees to contact the COVID-19 hotline [phone] or PCP office for questions related to COVID-19. For more information on steps you can take to protect yourself, see CDC's How to Protect Yourself     Patient/family/caregiver given information for [employer] and agrees to enroll no  Patient's preferred e-mail: declines  Patient's preferred phone number: declines    Discussed follow-up appointments. If no appointment was previously scheduled, appointment scheduling offered: 12/7 11:20. Is follow up appointment scheduled within 7 days of discharge? Yes  Non-[facility] follow up appointment(s):     Plan for follow-up call in 7-10 days based on severity of symptoms and risk factors. Plan for next call: Pt reports urinary pain and urgency. States improved from initial onset but continues. Using uribel. Completed Flagyl and on Levaquin. Advised on diet and excellent hydration, v/u. States she feels \"drained\" and a little constipated. Had a very small BM earlier. Enc use of fluids, high fiber diet, and daily use of senokot, v/u. Denies any fevers, chills, flu-like symptoms. CTN provided contact information for future needs.     Care Transitions 24 Hour Call    Do you have any ongoing symptoms?:  Yes  Patient-reported symptoms:  Other (Comment: Urinary pain and urgency)  Do you have a copy of your discharge instructions?:  Yes  Do you have all of your prescriptions and are they filled?:  Yes  Have you been contacted by a [employer]?:  No  Have you scheduled your follow up appointment?:  Yes  Were you discharged with any Home Care or Post Acute Services:  No  Do you feel like you have everything you need to keep you well at home?:  Yes  Care Transitions Interventions         Follow Up  Future Appointments   Date Time Provider Italo Garcia   12/4/2020  4:30 PM SCHEDULE, [address]   12/7/2020 11:20 AM MD Jose C Escamilla [address]   12/11/2020 11:30 AM Lee Salas DO [facility] [address] [facility] [address]   12/16/2020 11:30 AM LINSEY Enrique [facility] Yi   12/23/2020  4:15 PM Shamika Yoder MD [facility]

## 2020-12-01 NOTE — TELEPHONE ENCOUNTER
In hospital since thanksgiving (11/26/2020). Bad UTI. Has been in the [facility])  for three days and IV medicine levofloxacin was given. They prescribed her 5 day supply of levofloxacin. Doesn't know what else you can take to help. Please advise.

## 2020-12-01 NOTE — TELEPHONE ENCOUNTER
Patient reports she went to the ER last night for a UTI and they gave her Levofloxacin 250 mg. She states that the medication is not working and her bladder still burns. Please advise.

## 2020-12-02 ENCOUNTER — TELEPHONE (OUTPATIENT)
Dept: FAMILY MEDICINE CLINIC | Age: 65
End: 2020-12-02

## 2020-12-02 NOTE — TELEPHONE ENCOUNTER
Ninfa with [address] called, she is requesting an order for a  to come to the patients home. Please fax to [phone]. Sending this to Dr. Hair Benavidez, your collaborator as well.  Steve Perera

## 2020-12-03 RX ORDER — PSYLLIUM HUSK 0.4 G
0.52 CAPSULE ORAL 2 TIMES DAILY
Qty: 60 CAPSULE | Refills: 2 | Status: SHIPPED | OUTPATIENT
Start: 2020-12-03

## 2020-12-04 ENCOUNTER — NURSE ONLY (OUTPATIENT)
Dept: PRIMARY CARE CLINIC | Age: 65
End: 2020-12-04

## 2020-12-04 ENCOUNTER — HOSPITAL ENCOUNTER (OUTPATIENT)
Age: 65
Setting detail: SPECIMEN
Discharge: HOME | End: 2020-12-04
Payer: MEDICARE

## 2020-12-04 PROCEDURE — U0003 INFECTIOUS AGENT DETECTION BY NUCLEIC ACID (DNA OR RNA); SEVERE ACUTE RESPIRATORY SYNDROME CORONAVIRUS 2 (SARS-COV-2) (CORONAVIRUS DISEASE [COVID-19]), AMPLIFIED PROBE TECHNIQUE, MAKING USE OF HIGH THROUGHPUT TECHNOLOGIES AS DESCRIBED BY CMS-2020-01-R: HCPCS

## 2020-12-04 RX ORDER — PHENAZOPYRIDINE HYDROCHLORIDE 200 MG/1
200 TABLET, FILM COATED ORAL 2 TIMES DAILY PRN
Qty: 60 TABLET | Refills: 2 | Status: SHIPPED | OUTPATIENT
Start: 2020-12-04

## 2020-12-04 NOTE — PROGRESS NOTES
4/3/2020    TELEHEALTH EVALUATION -- Audio/Visual (During SDRHZ-37 public health emergency)    Due to COVID 19 outbreak, patient's office visit was converted to a virtual visit. Patient was contacted and agreed to proceed with a virtual visit via [employer]. me  The risks and benefits of converting to a virtual visit were discussed in light of the current infectious disease epidemic. Patient also understood that insurance coverage and co-pays are up to their individual insurance plans. HPI:    Alma Hernandez (:  1955) has requested an audio/video evaluation for the following concern(s):    Chronic back pain: She states that overall chronic back pain symptoms are unchanged. She continues to take Ultram routinely and the medication has been effective in reducing severity of symptoms. She is due for medication refill but unable to come to the office because of current coronavirus pandemic. Chronic pelvic pain/recurrent UTI/chronic bladder pain: She states that pelvic pain has been overall unchanged as well. She continues to follow with urology routinely and does still take routine antibiotic for recurrent UTI. She does not complain of any symptoms today. No flank pain reported. No fever or chills. COPD: She states that she has not had any recent shortness of breath symptoms but does have some concerns about going outdoors because of the coronavirus pandemic. She feels that she would be more susceptible to getting pneumonia if she got the virus. Does not have any access to facemasks and states that she does have a loved one who is currently in hospice and she would like to go visit that person. Review of Systems   This patient reports no chest pain or pressure. There is no shortness of breath or cough. The patient reports no nausea or vomiting. There is no heartburn or indigestion. There is no diarrhea or constipation. No black, bloody, mucusy or tarry stool noticed.   The patient reports no Cigarettes    Smokeless tobacco: Never Used    Tobacco comment: relaxes me   Substance Use Topics    Alcohol use: Yes     Alcohol/week: 5.0 standard drinks     Types: 5 Cans of beer per week     Comment: social    Drug use: No        PHYSICAL EXAMINATION:  [ INSTRUCTIONS:  \"[x]\" Indicates a positive item  \"[]\" Indicates a negative item  -- DELETE ALL ITEMS NOT EXAMINED]  [x] Alert  [x] Oriented to person/place/time    [x] No apparent distress  [] Toxic appearing    [] Face flushed appearing [] Sclera clear  [] Lips are cyanotic      [x] Breathing appears normal  [] Appears tachypneic      [] Rash on visible skin    [x] Cranial Nerves II-XII grossly intact    [x] Motor grossly intact in visible upper extremities    [] Motor grossly intact in visible lower extremities    [x] Normal Mood  [] Anxious appearing    [] Depressed appearing  [] Confused appearing      [] Poor short term memory  [] Poor long term memory     [] OTHER:      Due to this being a TeleHealth encounter, evaluation of the following organ systems is limited: Vitals/Constitutional/EENT/Resp/CV/GI//MS/Neuro/Skin/Heme-Lymph-Imm. Diagnosis Orders   1. Chronic right-sided low back pain with right-sided sciatica  traMADol (ULTRAM) 50 MG tablet   2. Chronic pelvic pain in female  traMADol (ULTRAM) 50 MG tablet   3. Interstitial cystitis     4. Chronic obstructive pulmonary disease, unspecified COPD type ([facility] [address]  Masks (FACE MASK EARLOOP-STYLE) MISC           Return in about 3 months (around 7/3/2020). 1.  2.  3.  Symptoms have been well managed with routine Ultram taken throughout the day. He has not had any side effects with the medication and it has been effective in reducing severity of symptoms. 4.  Prescription sent for loop facemask to local pharmacy.   She is encouraged to remain indoors as much as possible but because she plans to visit a loved one in hospice at a long-term care facility she is encouraged to wear a facemask and Fluconazole Counseling:  Patient counseled regarding adverse effects of fluconazole including but not limited to headache, diarrhea, nausea, upset stomach, liver function test abnormalities, taste disturbance, and stomach pain.  There is a rare possibility of liver failure that can occur when taking fluconazole.  The patient understands that monitoring of LFTs and kidney function test may be required, especially at baseline. The patient verbalized understanding of the proper use and possible adverse effects of fluconazole.  All of the patient's questions and concerns were addressed.

## 2020-12-06 LAB
SARS-COV-2: NOT DETECTED
SOURCE: NORMAL

## 2020-12-07 ENCOUNTER — VIRTUAL VISIT (OUTPATIENT)
Dept: FAMILY MEDICINE CLINIC | Age: 65
End: 2020-12-07
Payer: MEDICARE

## 2020-12-07 ENCOUNTER — TELEPHONE (OUTPATIENT)
Dept: FAMILY MEDICINE CLINIC | Age: 65
End: 2020-12-07

## 2020-12-07 PROBLEM — N39.41 URGE INCONTINENCE: Status: ACTIVE | Noted: 2020-12-07

## 2020-12-07 PROBLEM — N95.2 ATROPHIC VAGINITIS: Status: ACTIVE | Noted: 2020-12-07

## 2020-12-07 PROCEDURE — 99443 PR PHYS/QHP TELEPHONE EVALUATION 21-30 MIN: CPT | Performed by: FAMILY MEDICINE

## 2020-12-07 ASSESSMENT — ENCOUNTER SYMPTOMS
CHEST TIGHTNESS: 0
SHORTNESS OF BREATH: 0
DIARRHEA: 0
ANAL BLEEDING: 0
NAUSEA: 0
CONSTIPATION: 1
BLOOD IN STOOL: 0
VOMITING: 0
WHEEZING: 0
ABDOMINAL DISTENTION: 0
COUGH: 0
ABDOMINAL PAIN: 0

## 2020-12-07 NOTE — ASSESSMENT & PLAN NOTE
Further evaluation pending per urology office at [facility].   I stressed with patient the importance of keeping her visit as scheduled with the specialist.

## 2020-12-07 NOTE — ASSESSMENT & PLAN NOTE
Patient established with pain management. Chronic follow-up visits in place. I stressed with patient the importance of keeping regular follow-up with the specialist as instructed.

## 2020-12-07 NOTE — ASSESSMENT & PLAN NOTE
Likely contributing to patient's reported irritation/burning in vaginal area on urination. Further evaluation pending through urology office at [facility] in the next week.

## 2020-12-07 NOTE — PROGRESS NOTES
King Chandana is a 59 y.o. female evaluated via telephone on 12/7/2020. Consent:  She and/or health care decision maker is aware that that she may receive a bill for this telephone service, depending on her insurance coverage, and has provided verbal consent to proceed: Yes      Documentation:  I communicated with the patient and/or health care decision maker about recent hospital admission. Details of this discussion including any medical advice provided:     Patient presents for virtual telephone visit for hospital follow-up. She was admitted at [facility] on 11/26/2020 for UTI with pyelonephritis and dehydration. She showed improvement with antibiotic management and aggressive IV hydration. She complained of neck, back, and hip pain while admitted and was instructed to follow-up with pain management (Dr. Edison Clarke) and neurosurgery (Dr. Britney Pace) as an outpatient. She was noted to have anemia but no evidence of acute blood loss. Her drop in hemoglobin was thought to be related to aggressive hydration, but she was suggested to have anemia work-up as an outpatient with GI and GYN. She was restarted on topical vaginal estrogen for atrophic vaginitis and reported vaginal irritation. She was advised to follow-up with gynecology as an outpatient. She was found stable for discharge home on 12/01/2020 to follow-up with primary care pain management, neurosurgery, and gynecology as an outpatient. Patient reports feeling improved overall since hospital discharge. She has completed full course of Levaquin and Flagyl as prescribed from the hospital for antibiotic management. She denies any current dysuria, flank pain, hematuria, urinary frequency, fever/chills/sweats, nausea/vomiting, or diarrhea. She reports continued vaginal irritation that is worse with urination and reports chronically unchanged urinary urgency and episodes of urge incontinence.  She has a F/U visit with urology at [facility] (Dr. Arthur Licona) scheduled in the next week for cystoscopy and further management. She reports continued constipation managed with use of probiotics and fiber supplementation. She is scheduled with gastroenterology for endoscopy on 12/10/2020 to further evaluate. She has visits with Dr. Shahana Naranjo (GYN) on 12/11/2020 and orthopedics (Dr. Love Guadalupe) on 12/23/2020 that were scheduled on hospital discharge. Patient states she will need help with transportation and information regarding where and when these visits were scheduled. Review of Systems   Constitutional: Negative for appetite change, chills, diaphoresis, fatigue, fever and unexpected weight change. Eyes: Negative for visual disturbance. Respiratory: Negative for cough, chest tightness, shortness of breath and wheezing. Cardiovascular: Negative for chest pain, palpitations and leg swelling. No orthopnea, No PND   Gastrointestinal: Positive for constipation. Negative for abdominal distention, abdominal pain, anal bleeding, blood in stool, diarrhea, nausea and vomiting. No heartburn, No melena   Endocrine: Negative for cold intolerance, heat intolerance, polydipsia, polyphagia and polyuria. Genitourinary: Positive for urgency. Negative for dysuria, flank pain, frequency, hematuria and pelvic pain. Vaginal pain: vaginal irritation. Musculoskeletal: Negative for myalgias. Skin: Negative for rash. Neurological: Negative for dizziness, syncope, weakness, light-headedness, numbness and headaches. Problem List Items Addressed This Visit        Digestive    Irritable bowel syndrome with constipation    Drug-induced constipation     Further evaluation with endoscopy scheduled in the 3 next days. Follow-up visit with gastroenterology scheduled shortly thereafter to discuss long-term management. Musculoskeletal and Integument    Degenerative disc disease, lumbar     Patient established with pain management. Chronic follow-up visits in place.   I stressed with patient the importance of keeping regular follow-up with the specialist as instructed. Genitourinary    Interstitial cystitis     Likely contributing to patient's reported irritation/burning in vaginal area on urination. Further evaluation pending through urology office at [facility] in the next week. Pyelonephritis - Primary     Clinically resolved since hospital discharge and course of antibiotics. Patient without flank pain or symptoms of generalized illness. She has persistent urinary symptoms likely related to her interstitial cystitis and atrophic vaginitis along with urge incontinence. Follow-up visit with urogynecology is scheduled in the next week. Atrophic vaginitis     Patient was started on Estrace vaginal cream on discharge from the hospital and has follow-up visit with gynecology scheduled in the next week to discuss further long-term management. Other    Urge incontinence     Further evaluation pending per urology office at [facility]. I stressed with patient the importance of keeping her visit as scheduled with the specialist.                 Follow-up in 2 months with PCP Tayla Monteiro) for chronic disease check    I affirm this is a Patient Initiated Episode with a Patient who has not had a related appointment within my department in the past 7 days or scheduled within the next 24 hours.     Patient identification was verified at the start of the visit: Yes    Total Time: minutes: 21-30 minutes     Patient location: Individual Home  Provider location: Individual Home      Note: not billable if this call serves to triage the patient into an appointment for the relevant concern      LESTER Luque

## 2020-12-07 NOTE — ASSESSMENT & PLAN NOTE
Further evaluation with endoscopy scheduled in the 3 next days. Follow-up visit with gastroenterology scheduled shortly thereafter to discuss long-term management.

## 2020-12-07 NOTE — TELEPHONE ENCOUNTER
Jace Spence,  We can use the taxi vouchers to and from appointments within the [facility] system. We are still using [facility]. You will need to call to schedule at least a week or two in advance with [employer]. I always follow up with the patient after I make arrangements to give them the time and date confirmation.       Please call me with any questions or concerns.  [phone]

## 2020-12-07 NOTE — ASSESSMENT & PLAN NOTE
Patient was started on Estrace vaginal cream on discharge from the hospital and has follow-up visit with gynecology scheduled in the next week to discuss further long-term management.

## 2020-12-07 NOTE — ASSESSMENT & PLAN NOTE
Clinically resolved since hospital discharge and course of antibiotics. Patient without flank pain or symptoms of generalized illness. She has persistent urinary symptoms likely related to her interstitial cystitis and atrophic vaginitis along with urge incontinence. Follow-up visit with urogynecology is scheduled in the next week.

## 2020-12-09 ENCOUNTER — HOSPITAL ENCOUNTER (OUTPATIENT)
Age: 65
Setting detail: SPECIMEN
Discharge: HOME | End: 2020-12-09
Payer: MEDICARE

## 2020-12-09 ENCOUNTER — TELEPHONE (OUTPATIENT)
Dept: GASTROENTEROLOGY | Age: 65
End: 2020-12-09

## 2020-12-09 PROCEDURE — 87086 URINE CULTURE/COLONY COUNT: CPT

## 2020-12-09 PROCEDURE — 87186 SC STD MICRODIL/AGAR DIL: CPT

## 2020-12-09 PROCEDURE — 87077 CULTURE AEROBIC IDENTIFY: CPT

## 2020-12-11 ENCOUNTER — TELEPHONE (OUTPATIENT)
Dept: FAMILY MEDICINE CLINIC | Age: 65
End: 2020-12-11

## 2020-12-11 NOTE — TELEPHONE ENCOUNTER
Agustin Rodriguez called back (after talking with Devon Domingo)  complaining that [facility] told her they cant see labs done at [facility]. I told her the urine sample result wasn't completed. That when we call her with the result she can give us the name & number of the person or persons she wants to receive the urine results. Or she can give [facility] permission to ask us for the results.     Anmol Navarro.

## 2020-12-11 NOTE — TELEPHONE ENCOUNTER
Patient called and stated that she is having sharp pains in back, butt, and stomach, and it hurts when she urinated. She says the red pills she was given helped a little bit, but not much.

## 2020-12-11 NOTE — TELEPHONE ENCOUNTER
Virtual appt with walk in clinic in progress. Urine sample was dropped off on 12/9/2020. Has appt, with urology 12/16/2020.     Fernanda Vivas.

## 2020-12-12 LAB
ORGANISM: ABNORMAL
URINE CULTURE, ROUTINE: ABNORMAL

## 2020-12-14 ENCOUNTER — TELEPHONE (OUTPATIENT)
Dept: FAMILY MEDICINE CLINIC | Age: 65
End: 2020-12-14

## 2020-12-14 NOTE — TELEPHONE ENCOUNTER
There appears to be a urine culture ordered by her urologist. I do not see any other orders.  Please clarify

## 2020-12-14 NOTE — TELEPHONE ENCOUNTER
Patient is aware that she has to follow up with urologist and also Dr. Robi Piña is the one that has to give urine results to the patient. Patient is aware that she has to call Dr. Robi Piña office. I had faxed over to Dr. Robi Piña the urine culture results. mj

## 2020-12-14 NOTE — TELEPHONE ENCOUNTER
Dr. Jean Baptiste Neighbor I spoke with the patient regarding her urine culture results.  She is aware that she has to call her urologist Dr. Elpidio Carson for the results because he was the one who ordered the test.

## 2020-12-14 NOTE — TELEPHONE ENCOUNTER
Patient called in looking for urine sample results. Advised her we are waiting for the results.  She is requesting we fax the results over to her urologist Dr. Claudell Hotter Fax number [phone]

## 2020-12-15 ENCOUNTER — TELEPHONE (OUTPATIENT)
Dept: INFECTIOUS DISEASES | Age: 65
End: 2020-12-15

## 2020-12-15 ENCOUNTER — HOSPITAL ENCOUNTER (OUTPATIENT)
Age: 65
Setting detail: SPECIMEN
Discharge: HOME | End: 2020-12-15
Payer: MEDICARE

## 2020-12-15 PROCEDURE — 81001 URINALYSIS AUTO W/SCOPE: CPT

## 2020-12-15 NOTE — TELEPHONE ENCOUNTER
Patient called and stated Dr. Aleksandar Chowdhury did not receive the results and she would like them faxed over again. She would like a call when this is done.

## 2020-12-15 NOTE — TELEPHONE ENCOUNTER
Patient called earlier this morning states she was told she has a UTi by Urologist, but due to her Allergies unable to treat. Patient has C/o of burning and urgency. Advised will have to Update ID Physician. Later Dr Trish Mast, Urologist called and states they are unable to treat and request ID to review and evaluate. Results faxed. Per rview with Dr Lata Cuevas, she states to obtain a UA w/ Reflux, have patient F/u this Friday. Patient returned call, advised as per Dr Lata Cuevas, she was under impression this was a severe infection and IV Abx were needed. Advised Dr Lata Cuevas will treat once she has a Complete UA with micro and Culture. Patient verbalized understanding, although she is still experiencing back pain and burning.

## 2020-12-16 ENCOUNTER — TELEPHONE (OUTPATIENT)
Dept: INFECTIOUS DISEASES | Age: 65
End: 2020-12-16

## 2020-12-16 LAB
BACTERIA: NEGATIVE /HPF
BILIRUBIN URINE: ABNORMAL
BLOOD, URINE: NEGATIVE
CLARITY: CLEAR
COLOR: ABNORMAL
EPITHELIAL CELLS, UA: NORMAL /HPF (ref 0–5)
GLUCOSE URINE: NEGATIVE MG/DL
HYALINE CASTS: NORMAL /HPF (ref 0–5)
KETONES, URINE: NEGATIVE MG/DL
LEUKOCYTE ESTERASE, URINE: ABNORMAL
NITRITE, URINE: POSITIVE
PH UA: 7 (ref 5–9)
PROTEIN UA: NEGATIVE MG/DL
RBC UA: NORMAL /HPF (ref 0–5)
SPECIFIC GRAVITY UA: 1.01 (ref 1–1.03)
URINE REFLEX TO CULTURE: ABNORMAL
UROBILINOGEN, URINE: 1 E.U./DL
WBC UA: NORMAL /HPF (ref 0–5)

## 2020-12-16 RX ORDER — LINEZOLID 600 MG/1
600 TABLET, FILM COATED ORAL 2 TIMES DAILY
Qty: 20 TABLET | Refills: 0 | Status: SHIPPED | OUTPATIENT
Start: 2020-12-16 | End: 2020-12-26

## 2020-12-18 ENCOUNTER — VIRTUAL VISIT (OUTPATIENT)
Dept: INFECTIOUS DISEASES | Age: 65
End: 2020-12-18
Payer: MEDICARE

## 2020-12-18 PROCEDURE — 99213 OFFICE O/P EST LOW 20 MIN: CPT | Performed by: INTERNAL MEDICINE

## 2020-12-18 RX ORDER — LEVOFLOXACIN 500 MG/1
500 TABLET, FILM COATED ORAL DAILY
Qty: 7 TABLET | Refills: 0 | Status: SHIPPED | OUTPATIENT
Start: 2020-12-18 | End: 2020-12-25

## 2020-12-18 NOTE — PROGRESS NOTES
Subjective:      Patient ID: Morgan Whiting is a 59 y.o. female. HPI   Patient was informed that this is a billable visit. I am in my office, patient is at home/NH. Nell. me/ [employer] was used for communication and exam. Chart was reviewed and labs/ X Rays were discussed with the patient. our practice is making every effort to adhere to current recommendations, including social distancing. For the health and safety of our patients, and to prevent unnecessary exposure, we are currently scheduling telephone appointments. Patient was informed that these appointments are official appointments and will be billed through patient's insurance . Patient confirms this and agreed to the televised visit. Time spend in review of chart and on the IPAD using Doxy. me was 15   minutes. Follow-up recurrent UTI  Was hospitalized recently for acute pyelonephritis  Scheduled for cystoscopy by Gilmar Castellano on Monday    + burning on urination, frequency  Chronic low back pain  No vaginal discharge  On Estrace for atrophic vaginitis  Review of Systems   All other systems reviewed and are negative. Objective:   Physical Exam  Constitutional:       General: She is not in acute distress. HENT:      Head: Normocephalic. Nose: No congestion. Eyes:      General: No scleral icterus. Pulmonary:      Effort: Pulmonary effort is normal. No respiratory distress. Abdominal:      General: There is no distension. Musculoskeletal:         General: No swelling. Skin:     Coloration: Skin is not jaundiced. Findings: No erythema or rash. Neurological:      Mental Status: She is alert and oriented to person, place, and time.    Psychiatric:         Mood and Affect: Mood normal.         Behavior: Behavior normal.         Assessment:      Recurrent UTI        Plan:      Continue Estrace  Check cystoscopy scheduled for Monday with Dr Sigrid Caputo  Levaquin 500 mg daily x 1 week          Gee Vasquez MD

## 2020-12-21 ENCOUNTER — VIRTUAL VISIT (OUTPATIENT)
Dept: GASTROENTEROLOGY | Age: 65
End: 2020-12-21
Payer: MEDICARE

## 2020-12-21 PROBLEM — K59.00 CONSTIPATION: Status: ACTIVE | Noted: 2020-12-21

## 2020-12-21 PROCEDURE — 99212 OFFICE O/P EST SF 10 MIN: CPT | Performed by: NURSE PRACTITIONER

## 2020-12-21 ASSESSMENT — ENCOUNTER SYMPTOMS
DIARRHEA: 0
SHORTNESS OF BREATH: 0
COLOR CHANGE: 0
TROUBLE SWALLOWING: 0
RECTAL PAIN: 0
EYE REDNESS: 0
VOICE CHANGE: 0
CHEST TIGHTNESS: 0
NAUSEA: 0
ABDOMINAL PAIN: 0
WHEEZING: 0
VOMITING: 0
ANAL BLEEDING: 0
ABDOMINAL DISTENTION: 0
BLOOD IN STOOL: 0
PHOTOPHOBIA: 0
EYE PAIN: 0
CONSTIPATION: 1

## 2020-12-21 NOTE — PROGRESS NOTES
2020    TELEHEALTH EVALUATION -- [facility]/Visual [facility]    Due to COVID 19 outbreak, patient's office visit was converted to a virtual visit. Patient was contacted and agreed to proceed with a virtual visit via Telephone Visit, 15 minutes  The risks and benefits of converting to a virtual visit were discussed in light of the current infectious disease epidemic. Patient also understood that insurance coverage and co-pays are up to their individual insurance plans. HPI:    Rosy Hankins (:  1955) has requested an audio/video evaluation for the following concern(s): Follow-up to constipation, patient was previously seen in the office to establish care for chronic 1 year history of constipation in association with weight loss. Was scheduled to undergo recent colonoscopy however had to reschedule due to prep confusion. Patient is here today with current constant complaints of constipation, was previously recommended fiber, MiraLAX, Colace however does not take those on a consistent basis, patient reports a bowel movement every 3 days and takes Dulcolax as needed. Has a colonoscopy scheduled on . No Rectal bleeding or melena ,Nocturnal pain or progressive abdominal pain. Denies unexplained weight loss, fever, or other systemic symptoms. No First-degree relative with  colorectal cancer.  Not on anticoagulants or antiaggregant therapy     Background Patient came in today to establish GI care as follow-up to recent emergency department visit with complaints of constipation, she reports a 1 year history of progressively worsening constipation with an associated 10 pound weight loss. She does report intermittent associated lower abdominal cramping typically relieved by bowel movements no Rectal bleeding or melena ,nocturnal pain or progressive abdominal pain. Denies unexplained weight loss, fever, or other systemic symptoms. No First-degree relative with  colorectal cancer. Not on anticoagulants or antiaggregant therapy. Has tried MiraLAX, Dulcolax, fiber in the past for good relief. recent CT of the abdomen and pelvis essentially unremarkable, recent KUB notable for stool and gas throughout the colon. Recent CBC and CMP noted  Review of Systems   Constitutional: Negative for appetite change, chills, fever and unexpected weight change. HENT: Negative for nosebleeds, tinnitus, trouble swallowing and voice change. Eyes: Negative for photophobia, pain and redness. Respiratory: Negative for chest tightness, shortness of breath and wheezing. Cardiovascular: Negative for chest pain, palpitations and leg swelling. Gastrointestinal: Positive for constipation. Negative for abdominal distention, abdominal pain, anal bleeding, blood in stool, diarrhea, nausea, rectal pain and vomiting. Endocrine: Negative for polydipsia, polyphagia and polyuria. Genitourinary: Negative for difficulty urinating and hematuria. Skin: Negative for color change, pallor and rash. Neurological: Negative for dizziness, speech difficulty and headaches. Psychiatric/Behavioral: Negative for confusion and suicidal ideas. Prior to Visit Medications    Medication Sig Taking?  Authorizing Provider   levoFLOXacin (LEVAQUIN) 500 MG tablet Take 1 tablet by mouth daily for 7 days Yes Christi Elizabeth MD linezolid (ZYVOX) 600 MG tablet Take 1 tablet by mouth 2 times daily for 10 days Yes Benedict Dubin, MD   phenazopyridine (PYRIDIUM) 200 MG tablet Take 1 tablet by mouth 2 times daily as needed for Pain Yes LINSEY Tobias CNP   psyllium (METAMUCIL) 0.52 g capsule Take 1 capsule by mouth 2 times daily Yes LINSEY Tobias CNP   meloxicam (MOBIC) 7.5 MG tablet Take 1 tablet by mouth 2 times daily for 28 days Yes Sujata Brooks MD   pregabalin (LYRICA) 50 MG capsule Take 1 capsule by mouth 2 times daily for 28 days. Yes Sujata Brooks MD   estradiol (ESTRACE) 0.1 MG/GM vaginal cream Place vaginally Yes Historical Provider, MD   ZTLIDO 1.8 % PTCH APPLY UP TO 2 PATCHES OVER PAINFUL AREAS OF SPINE FOR 12 HOURS THEN REMOVE FOR 12 HOURS Yes Historical Provider, MD   Meth-Hyo-M Bl-Na Phos-Ph Sal (URIBEL) 118 MG CAPS TAKE 1 CAPSULE BY MOUTH 4 TIMES DAILY AS NEEDED FOR BLADDER PAIN URGENCY Yes LINSEY Conn CNP   clobetasol (TEMOVATE) 0.05 % cream Apply topically 2 times daily. Yes LINSEY Tobias CNP   lidocaine 4 % external patch Place 1 patch onto the skin daily Yes Historical Provider, MD   midodrine (PROAMATINE) 2.5 MG tablet Take 1 tablet by mouth 2 times daily (with meals) Yes Anna Madden MD   albuterol sulfate HFA (PROAIR HFA) 108 (90 Base) MCG/ACT inhaler Inhale 2 puffs into the lungs every 6 hours as needed for Wheezing or Shortness of Breath Yes LINSEY Tobias CNP   calcium-vitamin D (OSCAL 500/200 D-3) 500-200 MG-UNIT per tablet Take 1 tablet by mouth 2 times daily. Yes Historical Provider, MD       Social History     Tobacco Use    Smoking status: Current Every Day Smoker     Packs/day: 0.50     Years: 20.00     Pack years: 10.00     Types: Cigarettes    Smokeless tobacco: Never Used    Tobacco comment: relaxes me   Substance Use Topics    Alcohol use: Yes     Alcohol/week: 5.0 standard drinks     Types: 5 Cans of beer per week     Comment: social    Drug use:  No Allergies   Allergen Reactions    Macrobid [Nitrofurantoin Monohydrate Macrocrystals] Swelling    Nitrofurantoin Swelling    Bactrim [Sulfamethoxazole-Trimethoprim]     Pcn [Penicillins] Swelling     Rash and swelling of tongue c hospitalization yrs ago; no resp distress   ,   Past Medical History:   Diagnosis Date    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 Amish Miles Osteopenia     Sciatica     Urge incontinence 12/7/2020    Venous insufficiency    ,   Past Surgical History:   Procedure Laterality Date    COLONOSCOPY      CYSTOSCOPY N/A 2014    x2    FOREIGN BODY REMOVAL Right 04/21/15    PARTIAL, GROIN    HERNIA REPAIR      double hernia oct. 2 2013    HYSTERECTOMY  7/14/11    bso Wendelin Hunting    KIDNEY STONE SURGERY      x2    OTHER SURGICAL HISTORY      laser surgery facundo dx    ID COLON CA SCRN NOT HI RSK IND N/A 4/19/2017    COLONOSCOPY performed by Christie Ortega MD at [address] N/A 10/19/2016    ANAL PROCTO SIGMOIDOSCOPY RIGID / excision perineal nodule performed by Lary Faustin MD at [address] [place] [address]     ,   Social History     Tobacco Use    Smoking status: Current Every Day Smoker     Packs/day: 0.50     Years: 20.00     Pack years: 10.00     Types: Cigarettes    Smokeless tobacco: Never Used    Tobacco comment: relaxes me   Substance Use Topics    Alcohol use:  Yes     Alcohol/week: 5.0 standard drinks     Types: 5 Cans of beer per week     Comment: social    Drug use: No   ,   Family History   Problem Relation Age of Onset    Heart Disease Mother     Other Mother         dementia    Arthritis Father     Colon Cancer Neg Hx     Celiac Disease Neg Hx     Crohn's Disease Neg Hx PHYSICAL EXAMINATION: Deferred due to telephone visit  [ Ev Monteneg[place]:  \"[x]\" Indicates a positive item  \"[]\" Indicates a negative item  -- DELETE ALL ITEMS NOT EXAMINED]  [] Alert  [] Oriented to person/place/time    [] No apparent distress  [] Toxic appearing    [] Face flushed appearing [] Sclera clear  [] Lips are cyanotic      [] Breathing appears normal  [] Appears tachypneic      [] Rash on visible skin    [] Cranial Nerves II-XII grossly intact    [] Motor grossly intact in visible upper extremities    [] Motor grossly intact in visible lower extremities    [] Normal Mood  [] Anxious appearing    [] Depressed appearing  [] Confused appearing      [] Poor short term memory  [] Poor long term memory    [] OTHER:      Due to this being a TeleHealth encounter, evaluation of the following organ systems is limited: Vitals/Constitutional/EENT/Resp/CV/GI//MS/Neuro/Skin/Heme-Lymph-Imm. ASSESSMENT/PLAN:  1. Constipation  1 year history of progressively worsening constipation with associated abdominal cramping always associated with bowel movements and an unintentional 10 pound weight loss. Carries a diagnosis of IBS-C with opioid-induced constipation as well, however her symptoms have previously been well managed since 2017. Has tried MiraLAX, Dulcolax and fiber in the past for good relief however has not been consistently taking these recently on a regular basis. Denies rectal bleeding or melena, nocturnal pain or progressive abdominal pain, denies fever, or other systemic symptoms. No first-degree relative with CRC and not on any anticoagulation or antiaggregate therapy. Recent CT of the abdomen and pelvis essentially unremarkable recent KUB notable for stool and gas throughout the colon, recent CBC and CMP noted. Last colonoscopy 2017 by Dr. Ayanna Earl notable for severe diverticulosis.    -MiraLAX 17 g twice daily titrate to response  -Colace 100 mg once to twice daily titrate to response -Daily fiber supplement  -Adequate water and daily physical activity  -Keep scheduled appointment for colonoscopy on January 6, 2020 with Dr. Sarah Ellis  2. Associated medical conditions including but not limited to. ... History of opioid-induced constipation and IBS C, anal fissure, chronic pain, osteoporosis and kidney stones        Return in about 4 weeks (around 1/18/2021), or if symptoms worsen or fail to improve, for colonoscopy, post procedure results. An  electronic signature was used to authenticate this note. --LINSEY Leonardo - CNP on 12/21/2020 at 9:32 AM        Pursuant to the emergency declaration under the [facility] waiver authority and the [employer] Act, this Virtual  Visit was conducted, with patient's consent, to reduce the patient's risk of exposure to COVID-19 and provide continuity of care for an established patient.

## 2020-12-23 ENCOUNTER — TELEPHONE (OUTPATIENT)
Dept: INFECTIOUS DISEASES | Age: 65
End: 2020-12-23

## 2020-12-24 NOTE — TELEPHONE ENCOUNTER
I spoke to patient regarding if Dr. Christina Wong had called her about her urine culture results, and patient stated that yes they did went over her results.  I gave patient the new info about her urologist new referral.mj

## 2020-12-24 NOTE — TELEPHONE ENCOUNTER
1st attempt to reach patient.  Called patient @ [phone]   and left message on machine for patient to return call during normal business hours of 8:30 AM and 5 PM @ [phone] option 2.mj

## 2020-12-26 PROBLEM — N39.0 UTI (URINARY TRACT INFECTION): Status: RESOLVED | Noted: 2020-11-26 | Resolved: 2020-12-26

## 2020-12-28 ENCOUNTER — TELEPHONE (OUTPATIENT)
Dept: FAMILY MEDICINE CLINIC | Age: 65
End: 2020-12-28

## 2020-12-28 NOTE — TELEPHONE ENCOUNTER
Pt calling back states that she is using estrogen cream and using repends moisturizing cream. She is wondering if she can use both at the same time or if she should use them on opposite days?

## 2020-12-28 NOTE — TELEPHONE ENCOUNTER
Patient is asking what she can do to relieve throbbing irritation of vagina during the day. As estrogen cream is prescribed as nightly. Patient additionally states:  Patient was discharged by gyn office. Patient is moving to [facility] end of January. Please Advise.     Thank Simone Ramso.

## 2020-12-30 ENCOUNTER — NURSE ONLY (OUTPATIENT)
Dept: PRIMARY CARE CLINIC | Age: 65
End: 2020-12-30

## 2020-12-30 ENCOUNTER — TELEPHONE (OUTPATIENT)
Dept: FAMILY MEDICINE CLINIC | Age: 65
End: 2020-12-30

## 2020-12-30 ENCOUNTER — HOSPITAL ENCOUNTER (OUTPATIENT)
Age: 65
Setting detail: SPECIMEN
Discharge: HOME | End: 2020-12-30
Payer: MEDICARE

## 2020-12-30 DIAGNOSIS — Z01.818 PREOP TESTING: ICD-10-CM

## 2020-12-30 DIAGNOSIS — N30.10 INTERSTITIAL CYSTITIS: Primary | ICD-10-CM

## 2020-12-30 DIAGNOSIS — N39.0 RECURRENT UTI: ICD-10-CM

## 2020-12-30 NOTE — TELEPHONE ENCOUNTER
Patient called in because she needs another referral for urologist Dr. Rosalind Thompson is unable to see her due to previous cancellations   please advise

## 2020-12-31 ENCOUNTER — TELEPHONE (OUTPATIENT)
Dept: INFECTIOUS DISEASES | Age: 65
End: 2020-12-31

## 2020-12-31 NOTE — TELEPHONE ENCOUNTER
Patient called earlier today, 12/31/20, states she still has heavy burning. Found no UA completed as requested from last week. Patient states she completed the Levaquin medication and the Heavy burning sensation has returned. Patient confirms she has a GYN appt next week. Reported SnS to Dr Kenzie Bridges, she states have patient drink plenty of fluids, have a UA to reflux and Cx completed. Will address any infection once resulted. At this point no other meds will be ordered. Relayed update back to Patient, she verbalized understanding.

## 2021-01-01 LAB
SARS-COV-2: NOT DETECTED
SOURCE: NORMAL

## 2021-01-04 ENCOUNTER — HOSPITAL ENCOUNTER (OUTPATIENT)
Age: 66
Setting detail: SPECIMEN
Discharge: HOME | End: 2021-01-04
Payer: COMMERCIAL

## 2021-01-04 DIAGNOSIS — N39.0 RECURRENT UTI: Primary | ICD-10-CM

## 2021-01-04 PROCEDURE — 81001 URINALYSIS AUTO W/SCOPE: CPT

## 2021-01-05 ENCOUNTER — VIRTUAL VISIT (OUTPATIENT)
Dept: INFECTIOUS DISEASES | Age: 66
End: 2021-01-05
Payer: MEDICARE

## 2021-01-05 DIAGNOSIS — D04.9 BOWEN DISEASE: ICD-10-CM

## 2021-01-05 DIAGNOSIS — N76.0 ACUTE VAGINITIS: ICD-10-CM

## 2021-01-05 DIAGNOSIS — N39.0 RECURRENT UTI: Primary | ICD-10-CM

## 2021-01-05 LAB
BACTERIA: NEGATIVE /HPF
BILIRUBIN URINE: NEGATIVE
BLOOD, URINE: NEGATIVE
CLARITY: ABNORMAL
COLOR: ABNORMAL
EPITHELIAL CELLS, UA: ABNORMAL /HPF (ref 0–5)
GLUCOSE URINE: NEGATIVE MG/DL
KETONES, URINE: NEGATIVE MG/DL
LEUKOCYTE ESTERASE, URINE: NEGATIVE
NITRITE, URINE: POSITIVE
PH UA: 6.5 (ref 5–9)
PROTEIN UA: NEGATIVE MG/DL
RBC UA: ABNORMAL /HPF (ref 0–2)
SPECIFIC GRAVITY UA: 1 (ref 1–1.03)
URINE REFLEX TO CULTURE: ABNORMAL
UROBILINOGEN, URINE: 1 E.U./DL
WBC UA: ABNORMAL /HPF (ref 0–5)

## 2021-01-05 PROCEDURE — 1090F PRES/ABSN URINE INCON ASSESS: CPT | Performed by: INTERNAL MEDICINE

## 2021-01-05 PROCEDURE — 99214 OFFICE O/P EST MOD 30 MIN: CPT | Performed by: INTERNAL MEDICINE

## 2021-01-05 PROCEDURE — G8420 CALC BMI NORM PARAMETERS: HCPCS | Performed by: INTERNAL MEDICINE

## 2021-01-05 PROCEDURE — 4040F PNEUMOC VAC/ADMIN/RCVD: CPT | Performed by: INTERNAL MEDICINE

## 2021-01-05 PROCEDURE — 4004F PT TOBACCO SCREEN RCVD TLK: CPT | Performed by: INTERNAL MEDICINE

## 2021-01-05 PROCEDURE — G8400 PT W/DXA NO RESULTS DOC: HCPCS | Performed by: INTERNAL MEDICINE

## 2021-01-05 PROCEDURE — 1123F ACP DISCUSS/DSCN MKR DOCD: CPT | Performed by: INTERNAL MEDICINE

## 2021-01-05 PROCEDURE — 3017F COLORECTAL CA SCREEN DOC REV: CPT | Performed by: INTERNAL MEDICINE

## 2021-01-05 PROCEDURE — G8484 FLU IMMUNIZE NO ADMIN: HCPCS | Performed by: INTERNAL MEDICINE

## 2021-01-05 PROCEDURE — G8428 CUR MEDS NOT DOCUMENT: HCPCS | Performed by: INTERNAL MEDICINE

## 2021-01-05 NOTE — PROGRESS NOTES
2021    TELEHEALTH EVALUATION -- Audio/Visual (During AUFIV-11 public health emergency)      Yovani Finney (:  1955) has requested an audio/video evaluation for the following concern(s):    UTI     Due to the COVID-19 outbreak, patient's office visit was converted to a virtual visit. Patient was contacted and agreed to proceed with a virtual visit with me via Doxy. me with my location at the office. Patient reports that they are located at home. The risks and benefits of converting to a virtual visit were discussed in light of the current infectious disease epidemic. Services were provided through an audio/video synchronous discussion virtually to substitute for in person clinic visit. THIS virtual visit was conducted via interactive/real-time audio/video. Due to this being a TeleHealth encounter, evaluation of the following organ systems is limited: Vitals/Constitutional/EENT/Resp/CV/GI//MS/Neuro/Skin/Heme-Lymph-Imm.}    Pursuant to the emergency declaration under the [address] [place] [address], [facility] waiver authority and the [employer] Act, this Virtual Visit was conducted, with patient's consent, to reduce the patient's risk of exposure to COVID-19 and provide care for the patient. Yovani Finney  1955  female  Medical Record Number: [id number]       HPI:  Patient with hx of Bowens disease, presenting for dysuria and UTIs. 2021: UA 6-9 WBC and mostly epithelial cells without evidence of bacteria. 10/26/20 - UA negative. 2020 - UA + LE and cloudy urine. Nitrites negative, UC negative. (Currently on Cipro but still having burning, frequency, spasms, and pain.)  2020 - citrobacter and e faecalis ( treated with Cipro but symptoms still not relieved )   2020 - Enterococcus faecalis ( relieved of  symptoms with tx with zyvox x 5 days)  3/17/20: UA only small amount of LE.  Number of children: Not on file    Years of education: Not on file    Highest education level: Not on file   Occupational History    Occupation: ashli   Social Needs    Financial resource strain: Not on file    Food insecurity     Worry: Not on file     Inability: Not on file   [employer] needs     Medical: Not on file     Non-medical: Not on file   Tobacco Use    Smoking status: Current Every Day Smoker     Packs/day: 0.50     Years: 20.00     Pack years: 10.00     Types: Cigarettes    Smokeless tobacco: Never Used    Tobacco comment: relaxes me   Substance and Sexual Activity    Alcohol use:  Yes     Alcohol/week: 5.0 standard drinks     Types: 5 Cans of beer per week     Comment: social    Drug use: No    Sexual activity: Not Currently     Partners: Male   Lifestyle    Physical activity     Days per week: Not on file     Minutes per session: Not on file    Stress: Not on file   Relationships    Social connections     Talks on phone: Not on file     Gets together: Not on file     Attends Oriental orthodox service: Not on file     Active member of club or organization: Not on file     Attends meetings of clubs or organizations: Not on file     Relationship status: Not on file    Intimate partner violence     Fear of current or ex partner: Not on file     Emotionally abused: Not on file     Physically abused: Not on file     Forced sexual activity: Not on file   Other Topics Concern    Not on file   Social History Narrative    ** Merged History Encounter **            Family History   Problem Relation Age of Onset    Heart Disease Mother     Other Mother         dementia    Arthritis Father     Colon Cancer Neg Hx     Celiac Disease Neg Hx     Crohn's Disease Neg Hx        Current Outpatient Medications on File Prior to Visit   Medication Sig Dispense Refill    phenazopyridine (PYRIDIUM) 200 MG tablet Take 1 tablet by mouth 2 times daily as needed for Pain 60 tablet 2  psyllium (METAMUCIL) 0.52 g capsule Take 1 capsule by mouth 2 times daily 60 capsule 2    meloxicam (MOBIC) 7.5 MG tablet Take 1 tablet by mouth 2 times daily for 28 days 56 tablet 0    pregabalin (LYRICA) 50 MG capsule Take 1 capsule by mouth 2 times daily for 28 days. 56 capsule 0    estradiol (ESTRACE) 0.1 MG/GM vaginal cream Place vaginally      ZTLIDO 1.8 % PTCH APPLY UP TO 2 PATCHES OVER PAINFUL AREAS OF SPINE FOR 12 HOURS THEN REMOVE FOR 12 HOURS      Meth-Hyo-M Bl-Na Phos-Ph Sal (URIBEL) 118 MG CAPS TAKE 1 CAPSULE BY MOUTH 4 TIMES DAILY AS NEEDED FOR BLADDER PAIN URGENCY 120 capsule 1    clobetasol (TEMOVATE) 0.05 % cream Apply topically 2 times daily. 60 g 1    lidocaine 4 % external patch Place 1 patch onto the skin daily      midodrine (PROAMATINE) 2.5 MG tablet Take 1 tablet by mouth 2 times daily (with meals) 90 tablet 3    albuterol sulfate HFA (PROAIR HFA) 108 (90 Base) MCG/ACT inhaler Inhale 2 puffs into the lungs every 6 hours as needed for Wheezing or Shortness of Breath 1 Inhaler 3    calcium-vitamin D (OSCAL 500/200 D-3) 500-200 MG-UNIT per tablet Take 1 tablet by mouth 2 times daily. No current facility-administered medications on file prior to visit. Since we cannot conduct an in-person exam, the following were addressed with the patient to the best of my capability via virtual visit:   Patient does not perceive any new visual deficits  No diaphoresis or flushing in the face  Patient is able to flex and extend her neck with ease. Patient can inhale and exhale without any difficulty and chest seems to be expanding symmetrically. No conversational dyspnea. Patient does not feel any palpitations   Patient's  abdomen is not protruberant beyond their normal size. No new swelling of their joints.   No observed neurological changes or slurred speech when speaking to the patient    No new skin rash or ulcers      Labs: I have reviewed all lab results by electronic record, including most recent CBC, metabolic panel, and pertinent abnormalities were addressed from an infectious disease perspective. WBC trends are being monitored. Lab Results   Component Value Date     11/30/2020    K 4.2 11/30/2020    K 3.6 11/27/2020     11/30/2020    CO2 26 11/30/2020    BUN 14 11/30/2020    CREATININE 0.52 11/30/2020    GLUCOSE 110 11/30/2020    CALCIUM 9.6 11/30/2020      Lab Results   Component Value Date    WBC 4.8 11/30/2020    HGB 11.4 (L) 11/30/2020    HCT 33.9 (L) 11/30/2020    MCV 87.7 11/30/2020     11/30/2020       Radiology:   I have reviewed imaging results per electronic record and most pertinent abnormalities are being addressed from an infectious disease standpoint. Assessment:  Vaginal yeast infection/vaginitis - patient is on estrogen cream and this needs to be followed by a PCP or gyn. Recurrent UTIs - UA negative. Wears a Depends form time to time for incontinence. Hygiene discussed regarding frequent incontinence underwear or pad changes, cranberry supplement, and washing. She has colonoscopy scheduled tomorrow and is doing colon prep now. Multiple allergies to meds. dysuria  Perineal and perianal lesions with path + Bowen's Disease. Needs to be followed by gyn    Plan:  No more abx. Follow up with PCP  Follow up with Gyn. Reviewed all ways to help prevent UTI again and encouraged compliance with this.      Time spent today in combination of reviewing patient's chart, medications, labs, pictures when pertinent, provider communication as necessary, counseling patient, care/coordination not otherwise reported here, and patient face to face virtual visit >25 min.   >50% of that time spent counseling and coordination of patient care Patient was also appropriately counseled on preventive measures such as vaccinations, the importance of annual exam with their PCP, and the importance of health maintenance by dietary and exercise regimens. All questions were answered from an ID perspective and to the best of my ability.           Apple Swain D.O.

## 2021-01-05 NOTE — TELEPHONE ENCOUNTER
Patient stated that she will have to samantha her insurance first to see if her insurance will be fine with the network. Patient is going to call me back with the information. lam

## 2021-01-06 ENCOUNTER — HOSPITAL ENCOUNTER (OUTPATIENT)
Age: 66
Setting detail: OUTPATIENT SURGERY
Discharge: HOME | End: 2021-01-06
Attending: INTERNAL MEDICINE | Admitting: INTERNAL MEDICINE
Payer: MEDICARE

## 2021-01-06 ENCOUNTER — ANESTHESIA EVENT (OUTPATIENT)
Dept: ENDOSCOPY | Age: 66
End: 2021-01-06
Payer: MEDICARE

## 2021-01-06 ENCOUNTER — ANCILLARY PROCEDURE (OUTPATIENT)
Dept: ENDOSCOPY | Age: 66
End: 2021-01-06
Attending: INTERNAL MEDICINE
Payer: MEDICARE

## 2021-01-06 ENCOUNTER — ANESTHESIA (OUTPATIENT)
Dept: ENDOSCOPY | Age: 66
End: 2021-01-06
Payer: MEDICARE

## 2021-01-06 VITALS
HEART RATE: 79 BPM | TEMPERATURE: 98.1 F | BODY MASS INDEX: 22.49 KG/M2 | OXYGEN SATURATION: 96 % | RESPIRATION RATE: 16 BRPM | SYSTOLIC BLOOD PRESSURE: 153 MMHG | DIASTOLIC BLOOD PRESSURE: 73 MMHG | HEIGHT: 65 IN | WEIGHT: 135 LBS

## 2021-01-06 VITALS — OXYGEN SATURATION: 98 % | DIASTOLIC BLOOD PRESSURE: 77 MMHG | SYSTOLIC BLOOD PRESSURE: 133 MMHG

## 2021-01-06 PROCEDURE — 6360000002 HC RX W HCPCS: Performed by: NURSE ANESTHETIST, CERTIFIED REGISTERED

## 2021-01-06 PROCEDURE — 2580000003 HC RX 258: Performed by: INTERNAL MEDICINE

## 2021-01-06 PROCEDURE — 7100000011 HC PHASE II RECOVERY - ADDTL 15 MIN: Performed by: INTERNAL MEDICINE

## 2021-01-06 PROCEDURE — 3609027000 HC COLONOSCOPY: Performed by: INTERNAL MEDICINE

## 2021-01-06 PROCEDURE — 45385 COLONOSCOPY W/LESION REMOVAL: CPT | Performed by: INTERNAL MEDICINE

## 2021-01-06 PROCEDURE — 2580000003 HC RX 258

## 2021-01-06 PROCEDURE — 6370000000 HC RX 637 (ALT 250 FOR IP): Performed by: INTERNAL MEDICINE

## 2021-01-06 PROCEDURE — 3700000001 HC ADD 15 MINUTES (ANESTHESIA): Performed by: INTERNAL MEDICINE

## 2021-01-06 PROCEDURE — 7100000010 HC PHASE II RECOVERY - FIRST 15 MIN: Performed by: INTERNAL MEDICINE

## 2021-01-06 PROCEDURE — 2580000003 HC RX 258: Performed by: NURSE ANESTHETIST, CERTIFIED REGISTERED

## 2021-01-06 PROCEDURE — 3700000000 HC ANESTHESIA ATTENDED CARE: Performed by: INTERNAL MEDICINE

## 2021-01-06 PROCEDURE — 2500000003 HC RX 250 WO HCPCS: Performed by: NURSE ANESTHETIST, CERTIFIED REGISTERED

## 2021-01-06 PROCEDURE — 2709999900 HC NON-CHARGEABLE SUPPLY: Performed by: INTERNAL MEDICINE

## 2021-01-06 RX ORDER — TRAMADOL HYDROCHLORIDE 50 MG/1
25 TABLET ORAL EVERY 6 HOURS PRN
COMMUNITY

## 2021-01-06 RX ORDER — SIMETHICONE 20 MG/.3ML
EMULSION ORAL PRN
Status: DISCONTINUED | OUTPATIENT
Start: 2021-01-06 | End: 2021-01-06 | Stop reason: ALTCHOICE

## 2021-01-06 RX ORDER — LIDOCAINE HYDROCHLORIDE 20 MG/ML
INJECTION, SOLUTION INFILTRATION; PERINEURAL PRN
Status: DISCONTINUED | OUTPATIENT
Start: 2021-01-06 | End: 2021-01-06 | Stop reason: SDUPTHER

## 2021-01-06 RX ORDER — PROPOFOL 10 MG/ML
INJECTION, EMULSION INTRAVENOUS PRN
Status: DISCONTINUED | OUTPATIENT
Start: 2021-01-06 | End: 2021-01-06 | Stop reason: SDUPTHER

## 2021-01-06 RX ORDER — 0.9 % SODIUM CHLORIDE 0.9 %
500 INTRAVENOUS SOLUTION INTRAVENOUS ONCE
Status: DISCONTINUED | OUTPATIENT
Start: 2021-01-06 | End: 2021-01-06 | Stop reason: HOSPADM

## 2021-01-06 RX ORDER — SODIUM CHLORIDE 9 MG/ML
INJECTION, SOLUTION INTRAVENOUS
Status: DISCONTINUED
Start: 2021-01-06 | End: 2021-01-06 | Stop reason: HOSPADM

## 2021-01-06 RX ORDER — SODIUM CHLORIDE 9 MG/ML
INJECTION, SOLUTION INTRAVENOUS CONTINUOUS PRN
Status: DISCONTINUED | OUTPATIENT
Start: 2021-01-06 | End: 2021-01-06 | Stop reason: SDUPTHER

## 2021-01-06 RX ORDER — MAGNESIUM HYDROXIDE 1200 MG/15ML
LIQUID ORAL PRN
Status: DISCONTINUED | OUTPATIENT
Start: 2021-01-06 | End: 2021-01-06 | Stop reason: ALTCHOICE

## 2021-01-06 RX ADMIN — PROPOFOL 240 MG: 10 INJECTION, EMULSION INTRAVENOUS at 10:02

## 2021-01-06 RX ADMIN — SODIUM CHLORIDE: 9 INJECTION, SOLUTION INTRAVENOUS at 09:40

## 2021-01-06 RX ADMIN — SODIUM CHLORIDE 500 ML: 9 INJECTION, SOLUTION INTRAVENOUS at 09:18

## 2021-01-06 RX ADMIN — LIDOCAINE HYDROCHLORIDE 20 MG: 20 INJECTION, SOLUTION INFILTRATION; PERINEURAL at 10:02

## 2021-01-06 RX ADMIN — Medication 500 ML: at 09:18

## 2021-01-06 ASSESSMENT — PULMONARY FUNCTION TESTS
PIF_VALUE: 0

## 2021-01-06 ASSESSMENT — PAIN - FUNCTIONAL ASSESSMENT: PAIN_FUNCTIONAL_ASSESSMENT: 0-10

## 2021-01-06 ASSESSMENT — COPD QUESTIONNAIRES: CAT_SEVERITY: NO INTERVAL CHANGE

## 2021-01-06 NOTE — H&P
LINSEY Bright CNP   lidocaine 4 % external patch Place 1 patch onto the skin daily   Yes Historical Provider, MD   midodrine (PROAMATINE) 2.5 MG tablet Take 1 tablet by mouth 2 times daily (with meals) 7/28/20  Yes Ferrell Kocher, MD   albuterol sulfate HFA (PROAIR HFA) 108 (90 Base) MCG/ACT inhaler Inhale 2 puffs into the lungs every 6 hours as needed for Wheezing or Shortness of Breath 4/30/19  Yes LINSEY Iverson CNP   calcium-vitamin D (OSCAL 500/200 D-3) 500-200 MG-UNIT per tablet Take 1 tablet by mouth 2 times daily. Yes Historical Provider, MD   meloxicam (MOBIC) 7.5 MG tablet Take 1 tablet by mouth 2 times daily for 28 days 11/30/20 12/28/20  Ga Gutierrez MD   pregabalin (LYRICA) 50 MG capsule Take 1 capsule by mouth 2 times daily for 28 days. 11/30/20 12/28/20  Ga Gutierrez MD   Meth-Hyo-M Bl-Na Phos-Ph Sal (URIBEL) 118 MG CAPS TAKE 1 CAPSULE BY MOUTH 4 TIMES DAILY AS NEEDED FOR BLADDER PAIN URGENCY 11/16/20   LINSEY Iverson CNP       Allergies:    Allergies   Allergen Reactions    Macrobid [Nitrofurantoin Monohydrate Macrocrystals] Swelling    Nitrofurantoin Swelling    Bactrim [Sulfamethoxazole-Trimethoprim]     Pcn [Penicillins] Swelling     Rash and swelling of tongue c hospitalization yrs ago; no resp distress        History of allergic reaction to anesthesia:  No    Past Medical History:  Past Medical History:   Diagnosis Date    Bowen's disease     on buttock    Chronic back pain     Chronic kidney disease     [facility]ronic obstructive pulmonary disease) ([facility] [address]     Irritable bowel syndrome with constipation 1/27/2017    Lumbosacral spondylosis without myelopathy- Dr. Denita Camp Osteopenia     Sciatica     Urge incontinence 12/7/2020    Venous insufficiency        Past Surgical History:  Past Surgical History:   Procedure Laterality Date    COLONOSCOPY      CYSTOSCOPY N/A 2014    x2    FOREIGN BODY REMOVAL Right 04/21/15    PARTIAL, GROIN    HERNIA REPAIR      double hernia oct. 2 2013    HYSTERECTOMY  7/14/11    bso Aliyah Files    KIDNEY STONE SURGERY      x2    OTHER SURGICAL HISTORY      laser surgery facundo dx    [place] COLON CA SCRN NOT HI RSK IND N/A 4/19/2017    COLONOSCOPY performed by Heidi Yates MD at [address] N/A 10/19/2016    ANAL PROCTO SIGMOIDOSCOPY RIGID / excision perineal nodule performed by Hilda Rodriguez MD at [address] [place] [address]         Social History:  Social History     Tobacco Use    Smoking status: Current Every Day Smoker     Packs/day: 0.50     Years: 20.00     Pack years: 10.00     Types: Cigarettes    Smokeless tobacco: Never Used    Tobacco comment: relaxes me   Substance Use Topics    Alcohol use: Not Currently     Alcohol/week: 5.0 standard drinks     Types: 5 Cans of beer per week     Comment: social    Drug use: No       Vital Signs:   Vitals:    01/06/21 0909   BP: (!) 147/71   Pulse: 87   Resp: 18   Temp: 98.1 °F (36.7 °C)   SpO2: 96%        Physical Exam:  Cardiac:  [x]WNL  []Comments:  Pulmonary:  [x]WNL   []Comments:   Neuro/Mental Status:  [x]WNL  []Comments:  Abdominal:  [x]WNL    []Comments:  Other:   []WNL  []Comments:    Informed Consent:  The risks and benefits of the procedure have been discussed with either the patient or if they cannot consent, their representative. Assessment:  Patient examined and appropriate for planned sedation and procedure. Plan:  Proceed with planned sedation and procedure as above.     Stephanie Sagastume MD  9:50 AM

## 2021-01-06 NOTE — ANESTHESIA PRE PROCEDURE
Department of Anesthesiology  Preprocedure Note       Name:  Grace Armstrong   Age:  72 y.o.  :  1955                                          MRN:  [id number]         Date:  2021      Surgeon: Ankit Henning):  Roseann Kaur MD    Procedure: Procedure(s):  COLONOSCOPY DIAGNOSTIC    Medications prior to admission:   Prior to Admission medications    Medication Sig Start Date End Date Taking? Authorizing Provider   traMADol (ULTRAM) 50 MG tablet Take 25 mg by mouth every 6 hours as needed for Pain. Yes Historical Provider, MD   phenazopyridine (PYRIDIUM) 200 MG tablet Take 1 tablet by mouth 2 times daily as needed for Pain 20  Yes LINSEY Stuart CNP   psyllium (METAMUCIL) 0.52 g capsule Take 1 capsule by mouth 2 times daily 12/3/20  Yes LINSEY Stuart CNP   estradiol (ESTRACE) 0.1 MG/GM vaginal cream Place vaginally 20  Yes Historical Provider, MD   ZTLIDO 1.8 % PTCH APPLY UP TO 2 PATCHES OVER PAINFUL AREAS OF SPINE FOR 12 HOURS THEN REMOVE FOR 12 HOURS 10/1/20  Yes Historical Provider, MD   clobetasol (TEMOVATE) 0.05 % cream Apply topically 2 times daily. 10/8/20  Yes LINSEY Stuart CNP   lidocaine 4 % external patch Place 1 patch onto the skin daily   Yes Historical Provider, MD   midodrine (PROAMATINE) 2.5 MG tablet Take 1 tablet by mouth 2 times daily (with meals) 20  Yes James Gale MD   albuterol sulfate HFA (PROAIR HFA) 108 (90 Base) MCG/ACT inhaler Inhale 2 puffs into the lungs every 6 hours as needed for Wheezing or Shortness of Breath 19  Yes LINSEY Stuart CNP   calcium-vitamin D (OSCAL 500/200 D-3) 500-200 MG-UNIT per tablet Take 1 tablet by mouth 2 times daily. Yes Historical Provider, MD   meloxicam (MOBIC) 7.5 MG tablet Take 1 tablet by mouth 2 times daily for 28 days 20  Ana Kwan MD   pregabalin (LYRICA) 50 MG capsule Take 1 capsule by mouth 2 times daily for 28 days.  11/30/20 12/28/20  Ana Kwan MD Meth-Hyo-M Bl-Na Phos-Ph Sal (URIBEL) 118 MG CAPS TAKE 1 CAPSULE BY MOUTH 4 TIMES DAILY AS NEEDED FOR BLADDER PAIN URGENCY 11/16/20   LINSEY Aquino - CNP       Current medications:    Current Facility-Administered Medications   Medication Dose Route Frequency Provider Last Rate Last Admin    0.9 % sodium chloride bolus  500 mL Intravenous Once Freida Vera MD        sodium chloride 0.9 % infusion                Allergies:     Allergies   Allergen Reactions    Macrobid [Nitrofurantoin Monohydrate Macrocrystals] Swelling    Nitrofurantoin Swelling    Bactrim [Sulfamethoxazole-Trimethoprim]     Pcn [Penicillins] Swelling     Rash and swelling of tongue c hospitalization yrs ago; no resp distress       Problem List:    Patient Active Problem List   Diagnosis Code    COPD (chronic obstructive pulmonary disease) (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    Drug-induced constipation K59.03    Chronic pelvic pain in female R10.2, G89.29    Abdominal pain, chronic, generalized R10.84, G89.29    2-part disp fx of surgical neck of right humerus, init S42.221A    Orthostatic hypotension I95.1    Pyelonephritis N12    Arthritis of right hip M16.11    Degenerative disc disease, lumbar M51.36    Lumbar arthropathy M47.816    Urge incontinence N39.41    Atrophic vaginitis N95.2    Constipation K59.00       Past Medical History:        Diagnosis Date    Bowen's disease     on buttock    Chronic back pain Component Value Date    WBC 4.8 11/30/2020    RBC 3.86 11/30/2020    HGB 11.4 11/30/2020    HCT 33.9 11/30/2020    MCV 87.7 11/30/2020    RDW 14.8 11/30/2020     11/30/2020       CMP:   Lab Results   Component Value Date     11/30/2020    K 4.2 11/30/2020    K 3.6 11/27/2020     11/30/2020    CO2 26 11/30/2020    BUN 14 11/30/2020    CREATININE 0.52 11/30/2020    GFRAA >60.0 11/30/2020    LABGLOM >60.0 11/30/2020    GLUCOSE 110 11/30/2020    PROT 7.7 11/26/2020    CALCIUM 9.6 11/30/2020    BILITOT 0.4 11/26/2020    ALKPHOS 97 11/26/2020    AST 20 11/26/2020    ALT 19 11/26/2020       POC Tests: No results for input(s): POCGLU, POCNA, POCK, POCCL, POCBUN, POCHEMO, POCHCT in the last 72 hours. Coags: No results found for: PROTIME, INR, APTT    HCG (If Applicable): No results found for: PREGTESTUR, PREGSERUM, HCG, HCGQUANT     ABGs: No results found for: PHART, PO2ART, DND8UFL, HCK6HYO, BEART, M6RUOYFR     Type & Screen (If Applicable):  No results found for: LABABO, LABRH    Drug/Infectious Status (If Applicable):  No results found for: HIV, HEPCAB    COVID-19 Screening (If Applicable):   Lab Results   Component Value Date    COVID19 Not Detected 12/30/2020         Anesthesia Evaluation  Patient summary reviewed and Nursing notes reviewed  Airway: Mallampati: III  TM distance: >3 FB   Neck ROM: full  Mouth opening: > = 3 FB Dental: normal exam         Pulmonary:   (+) COPD: no interval change,  rhonchi,            Patient smoked on day of surgery. Cardiovascular:  Exercise tolerance: no interval change,         NYHA Classification: III    Rhythm: regular  Rate: normal           Beta Blocker:  Not on Beta Blocker         Neuro/Psych:                ROS comment: Chronic back pain GI/Hepatic/Renal:   (+) renal disease: CRI, bowel prep,           Endo/Other:    (+) : arthritis: OA., .                 Abdominal:       Abdomen: soft.     Vascular: Anesthesia Plan      MAC     ASA 3             Anesthetic plan and risks discussed with patient. Plan discussed with attending.                   LINSEY Leija - CRNA   1/6/2021

## 2021-01-06 NOTE — ANESTHESIA POSTPROCEDURE EVALUATION
Department of Anesthesiology  Postprocedure Note    Patient: Kelsie Bullard  MRN: [id number]  YOB: 1955  Date of evaluation: 1/6/2021  Time:  10:34 AM     Procedure Summary     Date: 01/06/21 Room / Location: [address] [place] [address]    Anesthesia Start: 5782 Anesthesia Stop:     Procedure: COLONOSCOPY DIAGNOSTIC (N/A ) Diagnosis: (96404 - Change in bowel habits)    Surgeons: Jo-Ann Gil MD Responsible Provider: LINSEY Wilson CRNA    Anesthesia Type: MAC ASA Status: 3          Anesthesia Type: MAC    Flori Phase I: Flori Score: 10    Flori Phase II:      Last vitals: Reviewed and per EMR flowsheets.        Anesthesia Post Evaluation    Patient location during evaluation: PACU  Patient participation: complete - patient participated  Level of consciousness: awake and alert  Pain score: 1  Airway patency: patent  Nausea & Vomiting: no nausea and no vomiting  Complications: no  Cardiovascular status: hemodynamically stable  Respiratory status: acceptable and room air  Hydration status: euvolemic  Comments: Report to RN, normal sinus rhythm

## 2021-01-07 ENCOUNTER — TELEPHONE (OUTPATIENT)
Dept: INFECTIOUS DISEASES | Age: 66
End: 2021-01-07

## 2021-01-07 NOTE — TELEPHONE ENCOUNTER
Per  patient has to address to her gynecology. Called patient spoke with her to address to gynecology,And She Verbalized Understanding.

## 2021-01-07 NOTE — TELEPHONE ENCOUNTER
Received call from Patient ,States she has white discharge from Vaginal and bad odor. I will consult .

## 2021-01-08 ENCOUNTER — TELEPHONE (OUTPATIENT)
Dept: FAMILY MEDICINE CLINIC | Age: 66
End: 2021-01-08

## 2021-01-08 ENCOUNTER — TELEPHONE (OUTPATIENT)
Dept: OBGYN CLINIC | Age: 66
End: 2021-01-08

## 2021-01-08 RX ORDER — CLOBETASOL PROPIONATE 0.5 MG/G
OINTMENT TOPICAL
Qty: 30 G | Refills: 0 | Status: SHIPPED | OUTPATIENT
Start: 2021-01-08

## 2021-01-08 RX ORDER — FLUCONAZOLE 150 MG/1
150 TABLET ORAL ONCE
Qty: 1 TABLET | Refills: 0 | Status: SHIPPED | OUTPATIENT
Start: 2021-01-08 | End: 2021-01-08

## 2021-01-08 NOTE — TELEPHONE ENCOUNTER
Topical external medication sent for discomfort. Yeast infection medication sent as well. Continue with plan to follow with gynecology on Monday.

## 2021-01-08 NOTE — TELEPHONE ENCOUNTER
Pt calling stating that she has an appt on Monday at 3 but she is experiencing throbbing pain and discharge and dryness. Tylenol and Advil are not helping. It was suggested by nurse to try an ice pack, if she is not able to tolerate pain or cannot wait until Monday she was advised to go to ready care or ER. Pt understood.

## 2021-01-08 NOTE — TELEPHONE ENCOUNTER
Patient is asking for something for her vaginitis:     Patient states:     She is dry, has throbbing pain & a little discharge. Daily Yasmani Her Gynecology appt, is Monday. She wants something for the weekend if possible.     Please & Thank Kylah Pinon.

## 2021-01-12 ENCOUNTER — TELEPHONE (OUTPATIENT)
Dept: FAMILY MEDICINE CLINIC | Age: 66
End: 2021-01-12

## 2021-01-12 RX ORDER — FLUCONAZOLE 150 MG/1
150 TABLET ORAL EVERY OTHER DAY
Qty: 7 TABLET | Refills: 0 | Status: SHIPPED | OUTPATIENT
Start: 2021-01-12 | End: 2021-01-25

## 2021-01-12 NOTE — TELEPHONE ENCOUNTER
Patient states she fell 1/11/21 in shower because of back pain. Patient had to rescheduled ob appt to 1/20/221  Is asking for a big yeast pill? To hold her over until her appointment. Please Advise. Thank You.   Thomas Diamond.

## 2021-01-14 ENCOUNTER — TELEPHONE (OUTPATIENT)
Dept: INFECTIOUS DISEASES | Age: 66
End: 2021-01-14

## 2021-01-14 NOTE — TELEPHONE ENCOUNTER
Patient concerned she may have a UTI, she has a sample of Urine to provide to the lab. Will fax over an order to [facility]. Patient states she will have an Appt with Dr Roberta Corbin on Monday, 1-. Inquired of the status with him due to her saying they canceled any further appointments? She states they could not do this and will see him on Monday. Order Faxed to [id number] [place] [address].

## 2021-01-20 ENCOUNTER — VIRTUAL VISIT (OUTPATIENT)
Dept: GASTROENTEROLOGY | Age: 66
End: 2021-01-20
Payer: MEDICARE

## 2021-01-20 DIAGNOSIS — K63.5 HYPERPLASTIC COLONIC POLYP, UNSPECIFIED PART OF COLON: ICD-10-CM

## 2021-01-20 DIAGNOSIS — K59.00 CONSTIPATION, UNSPECIFIED CONSTIPATION TYPE: Primary | ICD-10-CM

## 2021-01-20 DIAGNOSIS — K57.90 DIVERTICULOSIS: ICD-10-CM

## 2021-01-20 PROCEDURE — 99212 OFFICE O/P EST SF 10 MIN: CPT | Performed by: NURSE PRACTITIONER

## 2021-01-20 RX ORDER — MELOXICAM 15 MG/1
TABLET ORAL
COMMUNITY
Start: 2020-12-21

## 2021-01-20 ASSESSMENT — ENCOUNTER SYMPTOMS
VOICE CHANGE: 0
NAUSEA: 0
TROUBLE SWALLOWING: 0
COLOR CHANGE: 0
CONSTIPATION: 0
VOMITING: 0
ANAL BLEEDING: 0
WHEEZING: 0
BLOOD IN STOOL: 0
SHORTNESS OF BREATH: 0
EYE PAIN: 0
CHEST TIGHTNESS: 0
ABDOMINAL DISTENTION: 0
DIARRHEA: 0
RECTAL PAIN: 0
EYE REDNESS: 0
PHOTOPHOBIA: 0
ABDOMINAL PAIN: 0

## 2021-01-20 NOTE — PROGRESS NOTES
2021    TELEHEALTH EVALUATION -- Audio/Visual (During FBYSE-65 public health emergency)    Due to COVID 19 outbreak, patient's office visit was converted to a virtual visit. Patient was contacted and agreed to proceed with a virtual visit via Telephone Visit, 15 minutes  The risks and benefits of converting to a virtual visit were discussed in light of the current infectious disease epidemic. Patient also understood that insurance coverage and co-pays are up to their individual insurance plans. HPI:    Grace Armstrong (:  1955) has requested an audio/video evaluation for the following concern(s): telephone visit as follow-up to recent colonoscopy, recently underwent colonoscopy with Dr. Krysten Ambriz for constipation and unintentional weight loss, endoscopy procedure notes noted below and discussed at length with the patient. Since undergoing her colonoscopy she has been using MiraLAX twice daily and Colace twice daily and notes no further issues with constipation or weight loss. Denies nausea or vomiting, hematemesis, melena, or hematochezia. Patient mentions that she is relocating to [place] to live closer to her daughter and she will establish GI care.      Endoscopic Hx:  Colonoscopy 21 Dr Krysten Ambriz  Predominantly left colon diverticulosis  Small rectal polyp  Repeat colonoscopy in 3 to 5 years    Background Patient came in today to establish GI care as follow-up to recent emergency department visit with complaints of constipation, she reports a 1 year history of progressively worsening constipation with an associated 10 pound weight loss.  She does report intermittent associated lower abdominal cramping typically relieved by bowel movements no Rectal bleeding or melena ,nocturnal pain or progressive abdominal pain. Denies unexplained weight loss, fever, or other systemic symptoms. No First-degree relative with  colorectal cancer. Not on anticoagulants or antiaggregant therapy.  Has tried MiraLAX, Dulcolax, fiber in the past for good relief. recent CT of the abdomen and pelvis essentially unremarkable, recent KUB notable for stool and gas throughout the colon.  Recent CBC and CMP noted    Review of Systems   Constitutional: Negative for appetite change, chills, fever and unexpected weight change. HENT: Negative for nosebleeds, tinnitus, trouble swallowing and voice change. Eyes: Negative for photophobia, pain and redness. Respiratory: Negative for chest tightness, shortness of breath and wheezing. Cardiovascular: Negative for chest pain, palpitations and leg swelling. Gastrointestinal: Negative for abdominal distention, abdominal pain, anal bleeding, blood in stool, constipation, diarrhea, nausea, rectal pain and vomiting. Endocrine: Negative for polydipsia, polyphagia and polyuria. Genitourinary: Negative for difficulty urinating and hematuria. Skin: Negative for color change, pallor and rash. Neurological: Negative for dizziness, speech difficulty and headaches. Psychiatric/Behavioral: Negative for confusion and suicidal ideas. Prior to Visit Medications    Medication Sig Taking? Authorizing Provider   clobetasol (TEMOVATE) 0.05 % ointment Apply topically 2 times daily.  Yes LINSEY Zuñiga - CNP phenazopyridine (PYRIDIUM) 200 MG tablet Take 1 tablet by mouth 2 times daily as needed for Pain Yes LINSEY Dhillon CNP   pregabalin (LYRICA) 50 MG capsule Take 1 capsule by mouth 2 times daily for 28 days. Yes Eldon Puckett MD   estradiol (ESTRACE) 0.1 MG/GM vaginal cream Place vaginally Yes Historical Provider, MD   clobetasol (TEMOVATE) 0.05 % cream Apply topically 2 times daily. Yes LINSEY Dhillon CNP   lidocaine 4 % external patch Place 1 patch onto the skin daily Yes Historical Provider, MD   midodrine (PROAMATINE) 2.5 MG tablet Take 1 tablet by mouth 2 times daily (with meals) Yes Viktoriya Salazar MD   albuterol sulfate HFA (PROAIR HFA) 108 (90 Base) MCG/ACT inhaler Inhale 2 puffs into the lungs every 6 hours as needed for Wheezing or Shortness of Breath Yes LINSEY Dhillon CNP   calcium-vitamin D (OSCAL 500/200 D-3) 500-200 MG-UNIT per tablet Take 1 tablet by mouth 2 times daily. Yes Historical Provider, MD   meloxicam (MOBIC) 15 MG tablet TAKE 1 TABLET BY MOUTH TWICE DAILY  Historical Provider, MD   fluconazole (DIFLUCAN) 150 MG tablet Take 1 tablet by mouth every other day for 7 doses  Patient not taking: Reported on 1/20/2021  LINSEY Dhillon CNP   traMADol (ULTRAM) 50 MG tablet Take 25 mg by mouth every 6 hours as needed for Pain.   Historical Provider, MD   psyllium (METAMUCIL) 0.52 g capsule Take 1 capsule by mouth 2 times daily  Patient not taking: Reported on 1/20/2021  LINSEY Dhillon CNP   ZTLIDO 1.8 % PTCH APPLY UP TO 2 PATCHES OVER PAINFUL AREAS OF SPINE FOR 12 HOURS THEN REMOVE FOR 12 HOURS  Historical Provider, MD   Meth-Hyo-M Bl-Na Phos-Ph Sal (URIBEL) 118 MG CAPS TAKE 1 CAPSULE BY MOUTH 4 TIMES DAILY AS NEEDED FOR BLADDER PAIN URGENCY  Patient not taking: Reported on 1/20/2021  LINSEY Dhillon CNP       Social History     Tobacco Use    Smoking status: Current Every Day Smoker     Packs/day: 0.50     Years: 20.00     Pack years: 10.00 Substance Use Topics    Alcohol use: Not Currently     Alcohol/week: 5.0 standard drinks     Types: 5 Cans of beer per week     Comment: social    Drug use: No   ,   Family History   Problem Relation Age of Onset    Heart Disease Mother     Other Mother         dementia    Arthritis Father     Colon Cancer Neg Hx     Celiac Disease Neg Hx     Crohn's Disease Neg Hx        PHYSICAL EXAMINATION: Deferred due to telephone visit  [ Denice Rodriges:  \"[x]\" Indicates a positive item  \"[]\" Indicates a negative item  -- DELETE ALL ITEMS NOT EXAMINED]  [x] Alert  [x] Oriented to person/place/time    [] No apparent distress  [] Toxic appearing    [] Face flushed appearing [] Sclera clear  [] Lips are cyanotic      [] Breathing appears normal  [] Appears tachypneic      [] Rash on visible skin    [] Cranial Nerves II-XII grossly intact    [] Motor grossly intact in visible upper extremities    [] Motor grossly intact in visible lower extremities    [] Normal Mood  [] Anxious appearing    [] Depressed appearing  [] Confused appearing      [] Poor short term memory  [] Poor long term memory    [] OTHER:      Due to this being a TeleHealth encounter, evaluation of the following organ systems is limited: Vitals/Constitutional/EENT/Resp/CV/GI//MS/Neuro/Skin/Heme-Lymph-Imm. ASSESSMENT/PLAN:  1. Constipation/ colon polyp  Chronic history of constipation, carries a diagnosis of IBS-C with opioid-induced constipation as well, now well controlled with MiraLAX and Colace, recent colonoscopy noted 1 hyperplastic polyp, and diverticulosis. -Repeat colonoscopy in 3 to 5 years  -Continue MiraLAX and Colace, titrate to response  -Adequate water, fiber, and daily physical activity  2.   Diverticulosis  Discuss the natural Hx of diverticular disease / Diverticulosis   Recommend increase fiber intake and exercise program . Dietary fiber is associated with a decreased risk of symptomatic diverticular disease. A diet high in total fat and red meat is associated with an increased risk of symptomatic diverticular disease. There is no clear association between nut, corn, and popcorn consumption and the risk of diverticulosis and diverticular bleeding. Physical activity appears to reduce the risk of diverticulitis and diverticular bleeding  3. Associated medical conditions. .... Opioid-induced constipation, IBS-C, anal fissure, chronic pain, osteoporosis, and kidney stones. Patient is relocating to [place] next week and will establish GI care with a provider in [place]. Return in about 3 years (around 1/20/2024), or if symptoms worsen or fail to improve, for colonoscopy. An  electronic signature was used to authenticate this note. --LINSEY Tay CNP on 1/20/2021 at 1:46 PM        Pursuant to the emergency declaration under the [facility], 1135 waiver authority and the [employer] and [employer] Act, this Virtual  Visit was conducted, with patient's consent, to reduce the patient's risk of exposure to COVID-19 and provide continuity of care for an established patient.

## 2021-01-21 ENCOUNTER — VIRTUAL VISIT (OUTPATIENT)
Dept: FAMILY MEDICINE CLINIC | Age: 66
End: 2021-01-21
Payer: MEDICARE

## 2021-01-21 DIAGNOSIS — M51.36 DEGENERATIVE DISC DISEASE, LUMBAR: Primary | ICD-10-CM

## 2021-01-21 DIAGNOSIS — J44.9 CHRONIC OBSTRUCTIVE PULMONARY DISEASE, UNSPECIFIED COPD TYPE (HCC): ICD-10-CM

## 2021-01-21 DIAGNOSIS — N95.2 ATROPHIC VAGINITIS: ICD-10-CM

## 2021-01-21 PROCEDURE — 99441 PR PHYS/QHP TELEPHONE EVALUATION 5-10 MIN: CPT | Performed by: NURSE PRACTITIONER

## 2021-01-26 ENCOUNTER — TELEPHONE (OUTPATIENT)
Dept: FAMILY MEDICINE CLINIC | Age: 66
End: 2021-01-26

## 2021-01-26 DIAGNOSIS — N39.45 CONTINUOUS LEAKAGE OF URINE: Primary | ICD-10-CM

## 2021-01-27 NOTE — TELEPHONE ENCOUNTER
Patient called in today to state that she went to go schedule her US and was told there was no order in there for her. I looked and checked to see that the order was there. Scheduling called me and told me that there was also no order in there. I faxed over order but was told by scheduling that it may take 2 business days to receive. Fax confirmation placed in chart.  FYI to PCP

## 2022-02-14 ENCOUNTER — TELEPHONE (OUTPATIENT)
Dept: FAMILY MEDICINE CLINIC | Age: 67
End: 2022-02-14

## 2022-09-10 NOTE — TELEPHONE ENCOUNTER
Patient reports she is still in the ER, she will be be discharged tomorrow  but she she is feeling better No

## 2024-02-09 NOTE — PROGRESS NOTES
1/21/2021    TELEHEALTH EVALUATION -- Audio/Visual (During YOKGQ-57 public health emergency)    Due to COVID 19 outbreak, patient's office visit was converted to a virtual visit. Patient was contacted and agreed to proceed with a virtual visit via telephone visit. The risks and benefits of converting to a virtual visit were discussed in light of the current infectious disease epidemic. Patient also understood that insurance coverage and co-pays are up to their individual insurance plans. Vijay Orozco is a 72 y.o. female being evaluated by a Virtual Visit (video visit) encounter to address concerns as mentioned above. A caregiver was present when appropriate. Due to this being a TeleHealth encounter (During XPRXZ-02 public health emergency), evaluation of the following organ systems was limited: Vitals/Constitutional/EENT/Resp/CV/GI//MS/Neuro/Skin/Heme-Lymph-Imm. Pursuant to the emergency declaration under the [address] [place] [address] and the [employer] and [employer] Act, this Virtual Visit was conducted with patient's (and/or legal guardian's) consent, to reduce the patient's risk of exposure to COVID-19 and provide necessary medical care. The patient (and/or legal guardian) has also been advised to contact this office for worsening conditions or problems, and seek emergency medical treatment and/or call 911 if deemed necessary. Patient identification was verified at the start of the visit: Yes    Total time spent for this encounter: 8 minutes    Services were provided through a video synchronous discussion virtually to substitute for in-person clinic visit. Patient and provider were located at their individual homes. The patient is talking with me virtually from her home and I am located at my office in [facility].        HPI: Aurea Lund (:  1955) has requested an audio/video evaluation for the following concern(s):    Low back pain: bulging disc in her spine. She is working on building muscle tone back up. Back on tramadol and Lyrica. She is following with Dr. Jason Reid for now and plans to establish care with pain management and physical therapy in [place]. She is planning to move on . Planning to live with her younger daughter. Atrophic vaginitis: She states that she started on estrogen cream around . Tums have improved overall. She also has a topical cream that has been helpful to help with itching and discomfort. She has follow with gynecology and had normal cultures done. COPD: She states that her breathing has been fine. She has not had any chest congestion or sputum production. Review of Systems   This patient reports no chest pain or pressure. There is no shortness of breath or cough. The patient reports no nausea or vomiting. There is no heartburn or indigestion. There is no diarrhea or constipation. No black, bloody, mucusy or tarry stool noticed. The patient reports no bloating and no change in appetite. There is no numbness, tingling or swelling in the extremities. Prior to Visit Medications    Medication Sig Taking? Authorizing Provider   meloxicam (MOBIC) 15 MG tablet TAKE 1 TABLET BY MOUTH TWICE DAILY Yes Historical Provider, MD   fluconazole (DIFLUCAN) 150 MG tablet Take 1 tablet by mouth every other day for 7 doses Yes LINSEY De Los Santos CNP   clobetasol (TEMOVATE) 0.05 % ointment Apply topically 2 times daily. Yes LINSEY De Los Santos CNP   traMADol (ULTRAM) 50 MG tablet Take 25 mg by mouth every 6 hours as needed for Pain.  Yes Historical Provider, MD   phenazopyridine (PYRIDIUM) 200 MG tablet Take 1 tablet by mouth 2 times daily as needed for Pain Yes LINSEY Henry CNP psyllium (METAMUCIL) 0.52 g capsule Take 1 capsule by mouth 2 times daily Yes LINSEY Michelle CNP   estradiol (ESTRACE) 0.1 MG/GM vaginal cream Place vaginally Yes Historical Provider, MD   ZTLIDO 1.8 % PTCH APPLY UP TO 2 PATCHES OVER PAINFUL AREAS OF SPINE FOR 12 HOURS THEN REMOVE FOR 12 HOURS Yes Historical Provider, MD   Meth-Hyo-M Bl-Na Phos-Ph Sal (URIBEL) 118 MG CAPS TAKE 1 CAPSULE BY MOUTH 4 TIMES DAILY AS NEEDED FOR BLADDER PAIN URGENCY Yes LINSEY Conn CNP   clobetasol (TEMOVATE) 0.05 % cream Apply topically 2 times daily. Yes LINSEY Michelle CNP   lidocaine 4 % external patch Place 1 patch onto the skin daily Yes Historical Provider, MD   midodrine (PROAMATINE) 2.5 MG tablet Take 1 tablet by mouth 2 times daily (with meals) Yes Bakari Montiel MD   albuterol sulfate HFA (PROAIR HFA) 108 (90 Base) MCG/ACT inhaler Inhale 2 puffs into the lungs every 6 hours as needed for Wheezing or Shortness of Breath Yes LINSEY Michelle CNP   calcium-vitamin D (OSCAL 500/200 D-3) 500-200 MG-UNIT per tablet Take 1 tablet by mouth 2 times daily. Yes Historical Provider, MD   pregabalin (LYRICA) 50 MG capsule Take 1 capsule by mouth 2 times daily for 28 days. Dora Rossi MD       Social History     Tobacco Use    Smoking status: Current Every Day Smoker     Packs/day: 0.50     Years: 20.00     Pack years: 10.00     Types: Cigarettes    Smokeless tobacco: Never Used    Tobacco comment: relaxes me   Substance Use Topics    Alcohol use: Not Currently     Alcohol/week: 5.0 standard drinks     Types: 5 Cans of beer per week     Comment: social    Drug use: No       PHYSICAL EXAMINATION:  [ INSTRUCTIONS:  \"[x]\" Indicates a positive item  \"[]\" Indicates a negative item  -- DELETE ALL ITEMS NOT EXAMINED]  Telephone visit. Due to this being a TeleHealth encounter, evaluation of the following organ systems is limited: Vitals/Constitutional/EENT/Resp/CV/GI//MS/Neuro/Skin/Heme-Lymph-Imm. ASSESSMENT/PLAN:   Diagnosis Orders   1. Degenerative disc disease, lumbar     2. Atrophic vaginitis     3. Chronic obstructive pulmonary disease, unspecified COPD type ([facility] [address]         1. She is encouraged to contact her pain management specialist to see if additional refill can be given since she will be moving to a different state and it may take a bit to establish care with a new provider. I did review MRI results and she is encouraged to continue with physical therapy in [place]. 2.  Continue topical hormone and steroid medication to help with recurrent discomfort. Recent normal cultures reported. 3.  No recent exacerbation of symptoms. Please note this report has been partially produced using speech recognition software and may cause contain errors related to that system including grammar, punctuation and spelling as well as words and phrases that may seem inappropriate. If there are questions or concerns please feel free to contact me to clarify. An  electronic signature was used to authenticate this note. --David De Leon, APRN - CNP on 1/21/2021 at 9:56 AM        Pursuant to the emergency declaration under the [facility], 1135 waiver authority and the [employer] and [employer] Act, this Virtual  Visit was conducted, with patient's consent, to reduce the patient's risk of exposure to COVID-19 and provide continuity of care for an established patient. Services were provided through a video synchronous discussion virtually to substitute for in-person clinic visit. No

## (undated) DEVICE — ENDO CARRY-ON PROCEDURE KIT: Brand: ENDO CARRY-ON PROCEDURE KIT

## (undated) DEVICE — TRAP POLYP BALEEN

## (undated) DEVICE — GLOVE ORANGE PI 8 1/2   MSG9085

## (undated) DEVICE — SINGLE-USE POLYPECTOMY SNARE: Brand: CAPTIVATOR II

## (undated) DEVICE — GLOVE ORTHO 8   MSG9480

## (undated) DEVICE — BRUSH ENDO CLN L90.5IN SHTH DIA1.7MM BRIST DIA5-7MM 2-6MM

## (undated) DEVICE — ADAPTER FLSH PMP FLD MGMT GI IRRIG OFP 2 DISPOSABLE

## (undated) DEVICE — TUBE SET 96 MM 64 MM H2O PERISTALTIC STD AUX CHANNEL

## (undated) DEVICE — TUBING, SUCTION, 1/4" X 10', STRAIGHT: Brand: MEDLINE

## (undated) DEVICE — SINGLE PORT MANIFOLD: Brand: NEPTUNE 2

## (undated) DEVICE — Device: Brand: ENDO SMARTCAP